# Patient Record
Sex: FEMALE | Race: WHITE | NOT HISPANIC OR LATINO | URBAN - NONMETROPOLITAN AREA
[De-identification: names, ages, dates, MRNs, and addresses within clinical notes are randomized per-mention and may not be internally consistent; named-entity substitution may affect disease eponyms.]

---

## 2020-01-06 RX ORDER — LITHIUM CARBONATE 150 MG/1
150 CAPSULE ORAL
Qty: 270 CAPSULE | Refills: 0 | Status: SHIPPED | OUTPATIENT
Start: 2020-01-06 | End: 2020-10-20

## 2020-10-20 ENCOUNTER — OFFICE VISIT (OUTPATIENT)
Dept: SURGERY | Facility: CLINIC | Age: 40
End: 2020-10-20

## 2020-10-20 VITALS
BODY MASS INDEX: 36.19 KG/M2 | WEIGHT: 212 LBS | HEART RATE: 71 BPM | HEIGHT: 64 IN | SYSTOLIC BLOOD PRESSURE: 113 MMHG | DIASTOLIC BLOOD PRESSURE: 65 MMHG

## 2020-10-20 DIAGNOSIS — R19.00 ABDOMINAL WALL BULGE: Primary | ICD-10-CM

## 2020-10-20 PROCEDURE — 99243 OFF/OP CNSLTJ NEW/EST LOW 30: CPT | Performed by: SURGERY

## 2020-10-20 RX ORDER — LANOLIN ALCOHOL/MO/W.PET/CERES
1000 CREAM (GRAM) TOPICAL DAILY
COMMUNITY

## 2020-10-20 RX ORDER — HYDROCHLOROTHIAZIDE 25 MG/1
25 TABLET ORAL DAILY
COMMUNITY

## 2020-10-20 RX ORDER — TRAZODONE HYDROCHLORIDE 150 MG/1
150 TABLET ORAL NIGHTLY
COMMUNITY
End: 2021-01-08

## 2020-10-20 RX ORDER — LOSARTAN POTASSIUM 50 MG/1
50 TABLET ORAL DAILY
COMMUNITY

## 2020-10-20 RX ORDER — ROPINIROLE 1 MG/1
1 TABLET, FILM COATED ORAL NIGHTLY
COMMUNITY
End: 2021-01-22

## 2020-10-20 RX ORDER — PROMETHAZINE HYDROCHLORIDE 25 MG/1
25 TABLET ORAL EVERY 6 HOURS PRN
COMMUNITY
End: 2021-01-08

## 2020-10-20 RX ORDER — FLUOXETINE HYDROCHLORIDE 20 MG/1
40 CAPSULE ORAL DAILY
COMMUNITY
End: 2021-01-08

## 2020-10-20 NOTE — PROGRESS NOTES
Subjective   Yuni Garcia is a 40 y.o. female is being seen for consultation today at the request of Erasmo Moreno APRN    Yuni Garcia is a 40 y.o. female With a history of cholecystectomy and abdominoplasty presenting with bulge in the left upper quadrant.  There is tenderness to palpation along the spigelian line but no definitive hernia defect identified.  There is on the left upper quadrant.  All other incisions healed well with no evidence of hernia.  No obstructive symptoms reported.  Main complaint of bulging and pain.  She does not smoke.      History reviewed. No pertinent past medical history.    Family History   Problem Relation Age of Onset   • Diabetes Mother    • Heart disease Father        Social History     Socioeconomic History   • Marital status: Unknown     Spouse name: Not on file   • Number of children: Not on file   • Years of education: Not on file   • Highest education level: Not on file   Tobacco Use   • Smoking status: Never Smoker   • Smokeless tobacco: Never Used   Substance and Sexual Activity   • Alcohol use: Never     Frequency: Never   • Drug use: Never       Past Surgical History:   Procedure Laterality Date   • BREAST SURGERY      lift   • GALLBLADDER SURGERY     • STOMACH SURGERY      tummy tuck   • TUBAL ABDOMINAL LIGATION         Review of Systems   Constitutional: Negative for activity change, appetite change, chills and fever.   HENT: Negative for sore throat and trouble swallowing.    Eyes: Negative for visual disturbance.   Respiratory: Negative for cough and shortness of breath.    Cardiovascular: Negative for chest pain and palpitations.   Gastrointestinal: Positive for abdominal pain. Negative for abdominal distention, blood in stool, constipation, diarrhea, nausea and vomiting.   Endocrine: Negative for cold intolerance and heat intolerance.   Genitourinary: Negative for dysuria.   Musculoskeletal: Negative for joint swelling.   Skin: Negative for color  "change, rash and wound.   Allergic/Immunologic: Negative for immunocompromised state.   Neurological: Negative for dizziness, seizures, weakness and headaches.   Hematological: Negative for adenopathy. Does not bruise/bleed easily.   Psychiatric/Behavioral: Negative for agitation and confusion.         /65   Pulse 71   Ht 162.6 cm (64\")   Wt 96.2 kg (212 lb)   BMI 36.39 kg/m²   Objective   Physical Exam  Constitutional:       Appearance: She is well-developed.   HENT:      Head: Normocephalic and atraumatic.   Eyes:      Conjunctiva/sclera: Conjunctivae normal.      Pupils: Pupils are equal, round, and reactive to light.   Neck:      Musculoskeletal: Neck supple.      Thyroid: No thyromegaly.      Vascular: No JVD.      Trachea: No tracheal deviation.   Cardiovascular:      Rate and Rhythm: Normal rate and regular rhythm.      Heart sounds: No murmur. No friction rub. No gallop.    Pulmonary:      Effort: Pulmonary effort is normal.      Breath sounds: Normal breath sounds.   Abdominal:      General: There is no distension.      Palpations: Abdomen is soft. There is no hepatomegaly or splenomegaly.      Tenderness: There is no abdominal tenderness.      Hernia: No hernia is present.          Comments: Tenderness with abdominal wall bulge   Musculoskeletal: Normal range of motion.         General: No deformity.   Skin:     General: Skin is warm and dry.   Neurological:      Mental Status: She is alert and oriented to person, place, and time.               Assessment   Diagnoses and all orders for this visit:    1. Abdominal wall bulge (Primary)  -     CT Abdomen Pelvis Without Contrast; Future      Yuni Garcia is a 40 y.o. female with abdominal wall bulge concerning for spigelian hernia.  CT of the abdomen pelvis will be performed and she will follow up after imaging.    Patient's Body mass index is 36.39 kg/m². BMI is above normal parameters. Recommendations include: educational material.             "

## 2020-11-04 ENCOUNTER — APPOINTMENT (OUTPATIENT)
Dept: CT IMAGING | Facility: HOSPITAL | Age: 40
End: 2020-11-04

## 2021-01-08 ENCOUNTER — OFFICE VISIT (OUTPATIENT)
Dept: PSYCHIATRY | Facility: CLINIC | Age: 41
End: 2021-01-08

## 2021-01-08 ENCOUNTER — LAB (OUTPATIENT)
Dept: LAB | Facility: HOSPITAL | Age: 41
End: 2021-01-08

## 2021-01-08 VITALS
HEART RATE: 89 BPM | DIASTOLIC BLOOD PRESSURE: 86 MMHG | HEIGHT: 64 IN | SYSTOLIC BLOOD PRESSURE: 136 MMHG | WEIGHT: 200 LBS | BODY MASS INDEX: 34.15 KG/M2

## 2021-01-08 DIAGNOSIS — F32.2 CURRENT SEVERE EPISODE OF MAJOR DEPRESSIVE DISORDER WITHOUT PSYCHOTIC FEATURES, UNSPECIFIED WHETHER RECURRENT (HCC): Primary | ICD-10-CM

## 2021-01-08 DIAGNOSIS — Z79.899 MEDICATION MANAGEMENT: ICD-10-CM

## 2021-01-08 DIAGNOSIS — F42.4 SKIN-PICKING DISORDER: ICD-10-CM

## 2021-01-08 DIAGNOSIS — E66.9 OBESITY (BMI 30.0-34.9): ICD-10-CM

## 2021-01-08 DIAGNOSIS — F41.1 GAD (GENERALIZED ANXIETY DISORDER): ICD-10-CM

## 2021-01-08 DIAGNOSIS — F43.81 GRIEF REACTION WITH PROLONGED BEREAVEMENT: ICD-10-CM

## 2021-01-08 DIAGNOSIS — R53.83 LOW ENERGY: ICD-10-CM

## 2021-01-08 LAB
AMPHETAMINE CUT-OFF: ABNORMAL
BENZODIAZIPINE CUT-OFF: ABNORMAL
BUPRENORPHINE CUT-OFF: ABNORMAL
COCAINE CUT-OFF: ABNORMAL
EXTERNAL AMPHETAMINE SCREEN URINE: POSITIVE
EXTERNAL BENZODIAZEPINE SCREEN URINE: NEGATIVE
EXTERNAL BUPRENORPHINE SCREEN URINE: NEGATIVE
EXTERNAL COCAINE SCREEN URINE: NEGATIVE
EXTERNAL MDMA: NEGATIVE
EXTERNAL METHADONE SCREEN URINE: NEGATIVE
EXTERNAL METHAMPHETAMINE SCREEN URINE: NEGATIVE
EXTERNAL OPIATES SCREEN URINE: NEGATIVE
EXTERNAL OXYCODONE SCREEN URINE: NEGATIVE
EXTERNAL THC SCREEN URINE: NEGATIVE
MDMA CUT-OFF: ABNORMAL
METHADONE CUT-OFF: ABNORMAL
METHAMPHETAMINE CUT-OFF: ABNORMAL
OPIATES CUT-OFF: ABNORMAL
OXYCODONE CUT-OFF: ABNORMAL
THC CUT-OFF: ABNORMAL

## 2021-01-08 PROCEDURE — 80178 ASSAY OF LITHIUM: CPT

## 2021-01-08 PROCEDURE — 82746 ASSAY OF FOLIC ACID SERUM: CPT | Performed by: NURSE PRACTITIONER

## 2021-01-08 PROCEDURE — 84439 ASSAY OF FREE THYROXINE: CPT | Performed by: NURSE PRACTITIONER

## 2021-01-08 PROCEDURE — 80061 LIPID PANEL: CPT

## 2021-01-08 PROCEDURE — 82652 VIT D 1 25-DIHYDROXY: CPT

## 2021-01-08 PROCEDURE — 36415 COLL VENOUS BLD VENIPUNCTURE: CPT | Performed by: NURSE PRACTITIONER

## 2021-01-08 PROCEDURE — 82607 VITAMIN B-12: CPT | Performed by: NURSE PRACTITIONER

## 2021-01-08 PROCEDURE — 82248 BILIRUBIN DIRECT: CPT

## 2021-01-08 PROCEDURE — 80050 GENERAL HEALTH PANEL: CPT

## 2021-01-08 PROCEDURE — 90792 PSYCH DIAG EVAL W/MED SRVCS: CPT | Performed by: NURSE PRACTITIONER

## 2021-01-08 RX ORDER — FEXOFENADINE HCL 180 MG/1
TABLET ORAL
COMMUNITY
Start: 2020-12-11

## 2021-01-08 RX ORDER — PHENTERMINE HYDROCHLORIDE 37.5 MG/1
37.5 TABLET ORAL
COMMUNITY
End: 2021-01-22

## 2021-01-08 RX ORDER — CYANOCOBALAMIN 1000 UG/ML
INJECTION, SOLUTION INTRAMUSCULAR; SUBCUTANEOUS
COMMUNITY
Start: 2020-12-10

## 2021-01-08 RX ORDER — QUETIAPINE FUMARATE 25 MG/1
25 TABLET, FILM COATED ORAL NIGHTLY
Qty: 30 TABLET | Refills: 0 | Status: SHIPPED | OUTPATIENT
Start: 2021-01-08 | End: 2021-01-22

## 2021-01-08 RX ORDER — VORTIOXETINE 20 MG/1
20 TABLET, FILM COATED ORAL DAILY
Qty: 30 TABLET | Refills: 1 | Status: SHIPPED | OUTPATIENT
Start: 2021-01-08 | End: 2021-01-22

## 2021-01-08 RX ORDER — LITHIUM CARBONATE 300 MG
300 TABLET ORAL 2 TIMES DAILY
Qty: 60 TABLET | Refills: 0 | Status: SHIPPED | OUTPATIENT
Start: 2021-01-08 | End: 2021-01-22

## 2021-01-08 NOTE — PROGRESS NOTES
"Chief Complaint  No chief complaint on file.    Subjective     {CC  Problem List  Visit Diagnosis   Encounters  Notes  Medications  Labs  Result Review Imaging  Media :23}     Yuni Garcia presents to [facility] for   History of Present Illness    Objective   Vital Signs:   /86   Pulse 89   Ht 162.2 cm (63.86\")   Wt 90.7 kg (200 lb)   BMI 34.48 kg/m²       PHQ-9 Score:   PHQ-9 Total Score: 21     Mental Status Exam:   Hygiene:   {good/fair/poor:328150753}  Cooperation:  {Cooperation:868253055}  Eye Contact:  {Eye Contact:808777367}  Psychomotor Behavior:  {Psychomotor Behavior:78193}  Affect:  {Affect:510465073}  Mood: {Mood:23560}  Speech:  {Speech:379262114}  Thought Process:  {Thought Process:563776351}  Thought Content:  {Thought Content:794724646}  Suicidal:  {Suicidal:371561989}  Homicidal:  {Homidical:733649614}  Hallucinations:  {Hallucinations:461267028}  Delusion:  {Delusion:624974682}  Memory:  {Memory:424158440}  Orientation:  {Orientation:957766543}  Reliability:  {good/fair/poor:724256665}  Insight:  {good/fair/poor/none:622652193}  Judgement:  {good/fair/poor/impaired:672061161}  Impulse Control:  {good/fair/poor/impaired:631072133}  Physical/Medical Issues:  {No/Yes:442841123}     Current Medications:   Current Outpatient Medications   Medication Sig Dispense Refill   • cyanocobalamin 1000 MCG/ML injection      • fexofenadine (ALLEGRA) 180 MG tablet      • hydroCHLOROthiazide (HYDRODIURIL) 25 MG tablet Take 25 mg by mouth Daily.     • losartan (COZAAR) 50 MG tablet Take 50 mg by mouth Daily.     • phentermine (ADIPEX-P) 37.5 MG tablet Take 37.5 mg by mouth Every Morning Before Breakfast.     • rOPINIRole (REQUIP) 1 MG tablet Take 1 mg by mouth Every Night. Take 1 hour before bedtime.     • silver sulfadiazine (SILVADENE, SSD) 1 % cream Apply  topically to the appropriate area as directed 2 (Two) Times a Day.     • vitamin B-12 (CYANOCOBALAMIN) " 1000 MCG tablet Take 1,000 mcg by mouth Daily.     • lithium 300 MG tablet Take 1 tablet by mouth 2 (two) times a day. 60 tablet 0   • QUEtiapine (SEROquel) 25 MG tablet Take 1 tablet by mouth Every Night. 30 tablet 0   • Vortioxetine HBr (Trintellix) 20 MG tablet Take 20 mg by mouth Daily. Take with food 30 tablet 1     No current facility-administered medications for this visit.        Physical Exam   Result Review :{ Labs  Result Review  Imaging  Med Tab  Media :23}     {The following data was reviewed by (Optional):34520}  {Ambulatory Labs (Optional):73695}  {Data reviewed (Optional):87818:::1}        Assessment and Plan {CC Problem List  Visit Diagnosis  ROS  Review (Popup)  Health Maintenance  Quality  BestPractice  Medications  SmartSets  SnapShot Encounters  Media :23}   Problem List Items Addressed This Visit     None      Visit Diagnoses     Persistent depressive disorder    -  Primary    Relevant Medications    phentermine (ADIPEX-P) 37.5 MG tablet    Vortioxetine HBr (Trintellix) 20 MG tablet    QUEtiapine (SEROquel) 25 MG tablet    lithium 300 MG tablet    Skin-picking disorder        JUSTUS (generalized anxiety disorder)        Relevant Medications    phentermine (ADIPEX-P) 37.5 MG tablet    Vortioxetine HBr (Trintellix) 20 MG tablet    QUEtiapine (SEROquel) 25 MG tablet    lithium 300 MG tablet    Grief reaction with prolonged bereavement        Relevant Medications    Vortioxetine HBr (Trintellix) 20 MG tablet    QUEtiapine (SEROquel) 25 MG tablet    lithium 300 MG tablet    Low energy        Obesity (BMI 30.0-34.9)        Medication management        Relevant Orders    [facility] Drug Screen (Completed)    Lithium Level    CBC & Differential    Comprehensive Metabolic Panel    Vitamin D 1,25 Dihydroxy    Vitamin B12 & Folate    TSH    T4, free    Hepatic Function Panel    Lipid Panel            TREATMENT PLAN/GOALS: Continue supportive psychotherapy efforts and medications as indicated.  Treatment and medication options discussed during today's visit. Patient ackowledged and verbally consented to continue with current treatment plan and was educated on the importance of compliance with treatment and follow-up appointments.    MEDICATION ISSUES:  We discussed risks, benefits, and side effects of the above medications and the patient was agreeable with the plan. Patient was educated on the importance of compliance with treatment and follow-up appointments.  Patient is agreeable to call the office with any worsening of symptoms or onset of side effects. Patient is agreeable to call 911 or go to the nearest ER should he/she begin having SI/HI.      Counseled patient regarding multimodal approach with healthy nutrition, healthy sleep, regular physical activity, social activities, counseling, and medications.      Coping skills reviewed and encouraged positive framing of thoughts     Assisted patient in processing above session content; acknowledged and normalized patient’s thoughts, feelings, and concerns.  Applied  positive coping skills and behavior management in session.  Allowed patient to freely discuss issues without interruption or judgment. Provided safe, confidential environment to facilitate the development of positive therapeutic relationship and encourage open, honest communication. Assisted patient in identifying risk factors which would indicate the need for higher level of care including thoughts to harm self or others and/or self-harming behavior and encouraged patient to contact this office, call 911, or present to the nearest emergency room should any of these events occur. Discussed crisis intervention services and means to access.     MEDS ORDERED DURING VISIT:  New Medications Ordered This Visit   Medications   • Vortioxetine HBr (Trintellix) 20 MG tablet     Sig: Take 20 mg by mouth Daily. Take with food     Dispense:  30 tablet     Refill:  1   • QUEtiapine (SEROquel) 25 MG tablet      Sig: Take 1 tablet by mouth Every Night.     Dispense:  30 tablet     Refill:  0   • lithium 300 MG tablet     Sig: Take 1 tablet by mouth 2 (two) times a day.     Dispense:  60 tablet     Refill:  0       {Time Spent (Optional):10389}    Follow Up {Instructions Charge Capture  Follow-up Communications :23}  Return in about 2 weeks (around 1/22/2021), or if symptoms worsen or fail to improve.    Patient was given instructions and counseling regarding her condition or for health maintenance advice. Please see specific information pulled into the AVS if appropriate.     This document has been electronically signed by KRISHNA Rivers  January 8, 2021 14:39 EST    Part of this note may be an electronic transcription/translation of spoken language to printed text using the Dragon Dictation System.

## 2021-01-09 LAB
ALBUMIN SERPL-MCNC: 4.7 G/DL (ref 3.5–5.2)
ALBUMIN/GLOB SERPL: 1.6 G/DL
ALP SERPL-CCNC: 81 U/L (ref 39–117)
ALT SERPL W P-5'-P-CCNC: 15 U/L (ref 1–33)
ANION GAP SERPL CALCULATED.3IONS-SCNC: 10 MMOL/L (ref 5–15)
AST SERPL-CCNC: 20 U/L (ref 1–32)
BASOPHILS # BLD AUTO: 0.06 10*3/MM3 (ref 0–0.2)
BASOPHILS NFR BLD AUTO: 0.5 % (ref 0–1.5)
BILIRUB CONJ SERPL-MCNC: <0.2 MG/DL (ref 0–0.3)
BILIRUB SERPL-MCNC: 0.6 MG/DL (ref 0–1.2)
BUN SERPL-MCNC: 8 MG/DL (ref 6–20)
BUN/CREAT SERPL: 10.1 (ref 7–25)
CALCIUM SPEC-SCNC: 9.3 MG/DL (ref 8.6–10.5)
CHLORIDE SERPL-SCNC: 100 MMOL/L (ref 98–107)
CHOLEST SERPL-MCNC: 174 MG/DL (ref 0–200)
CO2 SERPL-SCNC: 27 MMOL/L (ref 22–29)
CREAT SERPL-MCNC: 0.79 MG/DL (ref 0.57–1)
DEPRECATED RDW RBC AUTO: 41.6 FL (ref 37–54)
EOSINOPHIL # BLD AUTO: 0.19 10*3/MM3 (ref 0–0.4)
EOSINOPHIL NFR BLD AUTO: 1.6 % (ref 0.3–6.2)
ERYTHROCYTE [DISTWIDTH] IN BLOOD BY AUTOMATED COUNT: 13.1 % (ref 12.3–15.4)
FOLATE SERPL-MCNC: >20 NG/ML (ref 4.78–24.2)
GFR SERPL CREATININE-BSD FRML MDRD: 80 ML/MIN/1.73
GLOBULIN UR ELPH-MCNC: 3 GM/DL
GLUCOSE SERPL-MCNC: 99 MG/DL (ref 65–99)
HCT VFR BLD AUTO: 43.2 % (ref 34–46.6)
HDLC SERPL-MCNC: 45 MG/DL (ref 40–60)
HGB BLD-MCNC: 14.4 G/DL (ref 12–15.9)
IMM GRANULOCYTES # BLD AUTO: 0.05 10*3/MM3 (ref 0–0.05)
IMM GRANULOCYTES NFR BLD AUTO: 0.4 % (ref 0–0.5)
LDLC SERPL CALC-MCNC: 97 MG/DL (ref 0–100)
LDLC/HDLC SERPL: 2.04 {RATIO}
LITHIUM SERPL-SCNC: 0.2 MMOL/L (ref 0.6–1.2)
LYMPHOCYTES # BLD AUTO: 2.68 10*3/MM3 (ref 0.7–3.1)
LYMPHOCYTES NFR BLD AUTO: 22.8 % (ref 19.6–45.3)
MCH RBC QN AUTO: 29.2 PG (ref 26.6–33)
MCHC RBC AUTO-ENTMCNC: 33.3 G/DL (ref 31.5–35.7)
MCV RBC AUTO: 87.6 FL (ref 79–97)
MONOCYTES # BLD AUTO: 0.64 10*3/MM3 (ref 0.1–0.9)
MONOCYTES NFR BLD AUTO: 5.4 % (ref 5–12)
NEUTROPHILS NFR BLD AUTO: 69.3 % (ref 42.7–76)
NEUTROPHILS NFR BLD AUTO: 8.16 10*3/MM3 (ref 1.7–7)
NRBC BLD AUTO-RTO: 0 /100 WBC (ref 0–0.2)
PLATELET # BLD AUTO: 376 10*3/MM3 (ref 140–450)
PMV BLD AUTO: 10.9 FL (ref 6–12)
POTASSIUM SERPL-SCNC: 3.7 MMOL/L (ref 3.5–5.2)
PROT SERPL-MCNC: 7.7 G/DL (ref 6–8.5)
RBC # BLD AUTO: 4.93 10*6/MM3 (ref 3.77–5.28)
SODIUM SERPL-SCNC: 137 MMOL/L (ref 136–145)
T4 FREE SERPL-MCNC: 1.23 NG/DL (ref 0.93–1.7)
TRIGL SERPL-MCNC: 185 MG/DL (ref 0–150)
TSH SERPL DL<=0.05 MIU/L-ACNC: 2.4 UIU/ML (ref 0.27–4.2)
VIT B12 BLD-MCNC: 1289 PG/ML (ref 211–946)
VLDLC SERPL-MCNC: 32 MG/DL (ref 5–40)
WBC # BLD AUTO: 11.78 10*3/MM3 (ref 3.4–10.8)

## 2021-01-11 LAB — 1,25(OH)2D SERPL-MCNC: 44.2 PG/ML (ref 19.9–79.3)

## 2021-01-18 ENCOUNTER — TELEPHONE (OUTPATIENT)
Dept: PSYCHIATRY | Facility: CLINIC | Age: 41
End: 2021-01-18

## 2021-01-18 DIAGNOSIS — Z79.899 LITHIUM USE: Primary | ICD-10-CM

## 2021-01-18 NOTE — TELEPHONE ENCOUNTER
Spoke with Yuni regarding her lithium level of 0.2 which was drawn on 1/9/21. States she started taking 300 mg p.o. twice daily when she left the office on 1/9/2021.  Prior to that she was taking lithium 150 mg p.o. twice daily.  She was advised to continue taking lithium 300 mg p.o. twice daily and return for lithium blood draw prior to her follow-up on 1/22/2021.  Patient was advised take her lithium 10-12 hours prior to having her level drawn. .  She states she has been making herself drink a lot of water.  She notices no negative side effects from lithium.  She believes her mood has slightly improved and she is not wanting to sleep as much.  She states she stopped taking Trintellix due to nausea.  She had some Prozac on hand and started taking Prozac 40 mg.  Patient advised to seek provider's direction prior to abruptly discontinuing or starting psychiatric medications.  Risk of serotonin syndrome and symptoms discussed.  Patient verbalizes understanding.  She is to have lithium level drawn before follow-up appointment

## 2021-01-21 ENCOUNTER — LAB (OUTPATIENT)
Dept: LAB | Facility: HOSPITAL | Age: 41
End: 2021-01-21

## 2021-01-21 DIAGNOSIS — Z79.899 LITHIUM USE: ICD-10-CM

## 2021-01-21 PROCEDURE — 36415 COLL VENOUS BLD VENIPUNCTURE: CPT

## 2021-01-21 PROCEDURE — 80178 ASSAY OF LITHIUM: CPT

## 2021-01-22 ENCOUNTER — OFFICE VISIT (OUTPATIENT)
Dept: PSYCHIATRY | Facility: CLINIC | Age: 41
End: 2021-01-22

## 2021-01-22 VITALS
BODY MASS INDEX: 34.72 KG/M2 | SYSTOLIC BLOOD PRESSURE: 131 MMHG | TEMPERATURE: 97.5 F | WEIGHT: 203.4 LBS | DIASTOLIC BLOOD PRESSURE: 77 MMHG | HEIGHT: 64 IN | HEART RATE: 71 BPM

## 2021-01-22 DIAGNOSIS — F42.2 MIXED OBSESSIONAL THOUGHTS AND ACTS: ICD-10-CM

## 2021-01-22 DIAGNOSIS — F99 INSOMNIA DUE TO OTHER MENTAL DISORDER: ICD-10-CM

## 2021-01-22 DIAGNOSIS — Z79.899 ENCOUNTER FOR LITHIUM MONITORING: ICD-10-CM

## 2021-01-22 DIAGNOSIS — F42.4 COMPULSIVE SKIN PICKING: ICD-10-CM

## 2021-01-22 DIAGNOSIS — F51.05 INSOMNIA DUE TO OTHER MENTAL DISORDER: ICD-10-CM

## 2021-01-22 DIAGNOSIS — F33.1 MODERATE EPISODE OF RECURRENT MAJOR DEPRESSIVE DISORDER (HCC): Primary | ICD-10-CM

## 2021-01-22 DIAGNOSIS — Z51.81 ENCOUNTER FOR LITHIUM MONITORING: ICD-10-CM

## 2021-01-22 DIAGNOSIS — F41.1 GAD (GENERALIZED ANXIETY DISORDER): ICD-10-CM

## 2021-01-22 DIAGNOSIS — Z79.899 MEDICATION MANAGEMENT: ICD-10-CM

## 2021-01-22 LAB
AMPHETAMINE CUT-OFF: NORMAL
BENZODIAZIPINE CUT-OFF: NORMAL
BUPRENORPHINE CUT-OFF: NORMAL
COCAINE CUT-OFF: NORMAL
EXTERNAL AMPHETAMINE SCREEN URINE: NEGATIVE
EXTERNAL BENZODIAZEPINE SCREEN URINE: NEGATIVE
EXTERNAL BUPRENORPHINE SCREEN URINE: NEGATIVE
EXTERNAL COCAINE SCREEN URINE: NEGATIVE
EXTERNAL MDMA: NEGATIVE
EXTERNAL METHADONE SCREEN URINE: NEGATIVE
EXTERNAL METHAMPHETAMINE SCREEN URINE: NEGATIVE
EXTERNAL OPIATES SCREEN URINE: NEGATIVE
EXTERNAL OXYCODONE SCREEN URINE: NEGATIVE
EXTERNAL THC SCREEN URINE: NEGATIVE
LITHIUM SERPL-SCNC: 0.4 MMOL/L (ref 0.6–1.2)
MDMA CUT-OFF: NORMAL
METHADONE CUT-OFF: NORMAL
METHAMPHETAMINE CUT-OFF: NORMAL
OPIATES CUT-OFF: NORMAL
OXYCODONE CUT-OFF: NORMAL
THC CUT-OFF: NORMAL

## 2021-01-22 PROCEDURE — 99215 OFFICE O/P EST HI 40 MIN: CPT | Performed by: NURSE PRACTITIONER

## 2021-01-22 RX ORDER — FLUTICASONE PROPIONATE 50 MCG
SPRAY, SUSPENSION (ML) NASAL
COMMUNITY

## 2021-01-22 RX ORDER — LITHIUM CARBONATE 300 MG
450 TABLET ORAL 2 TIMES DAILY
Qty: 90 TABLET | Refills: 0 | Status: SHIPPED | OUTPATIENT
Start: 2021-01-22 | End: 2021-02-05 | Stop reason: SDUPTHER

## 2021-01-22 RX ORDER — SENNA PLUS 8.6 MG/1
2 TABLET ORAL DAILY PRN
Qty: 60 TABLET | Refills: 0 | Status: SHIPPED | OUTPATIENT
Start: 2021-01-22 | End: 2021-02-05 | Stop reason: SDUPTHER

## 2021-01-22 RX ORDER — CLOMIPRAMINE HYDROCHLORIDE 25 MG/1
25 CAPSULE ORAL NIGHTLY
Qty: 30 CAPSULE | Refills: 0 | Status: SHIPPED | OUTPATIENT
Start: 2021-01-22 | End: 2021-02-05

## 2021-01-22 NOTE — PROGRESS NOTES
Subjective   Yuni Garcia is a 40 y.o. female who presents today for her 2-week follow-up appointment for medication management.     Chief Complaint:  Depression, Low motivation, insomnia,     History of Present Illness:   Yuni  appears depressed. States she went to the graveyard yesterday and placed núñez on her father's grave.  She reports feeling depressed, anxious and unmotivated but believes she is somewhat improved.  Seroquel 25 mg p.o. at bedtime has helped her initiate sleep. She continues to have interrupted sleep.  Denies nightmares.  Average duration of sleep 5-6 hours per night.  She was diagnosed with sleep apnea years ago and has since lost 50 pounds.  She no longer feels like it is a problem because her  tells her she does not snore anymore. Patient encouraged to get reevaluated.  Yuni states she stopped taking Trintellix after she left the office two weeks ago and is now starting to get her appetite back and has since gained  4 pounds.  After she stopped the Trintellix, she took one Prozac pill, but it worsened her skin picking so she stopped. Patient educated regarding potential interactions and dangers associated with stopping medication abruptly and taking psychotropic medication not  prescribed. Patient verbalizes understanding.     Patient's PHQ score is improved to 13.  She denies SI/HI/AVH.  Her JUSTUS score is 17.  She describes obsessive thoughts and behaviors  that involve self cleaning, repetitively checking and locking doors, picking her skin that are out of the normal habits and behaviors.  Her compulsions clean and make her irritable towards her children.    PHQ-9 Depression Screening  Little interest or pleasure in doing things? 2   Feeling down, depressed, or hopeless? 2   Trouble falling or staying asleep, or sleeping too much? 2   Feeling tired or having little energy? 3   Poor appetite or overeating? 0   Feeling bad about yourself - or that you are a failure or have let  yourself or your family down? 0   Trouble concentrating on things, such as reading the newspaper or watching television? 1   Moving or speaking so slowly that other people could have noticed? Or the opposite - being so fidgety or restless that you have been moving around a lot more than usual? 3   Thoughts that you would be better off dead, or of hurting yourself in some way? 0   PHQ-9 Total Score 13   If you checked off any problems, how difficult have these problems made it for you to do your work, take care of things at home, or get along with other people? Very difficult     Medication treatment options discussed with Yuni.  She denies medication side effects. Signs and symptoms of lithium toxicity reviewed. Patient verbalizes understanding and agrees to monitor and report symptoms to provider. Patient states that she is making sure she drinks an adequate amount of water.  Labs dated 1/8/2020 reviewed.  Patient's lithium level at that time was 0.4. Risks, benefits, potential side effects of increasing lithium dose to 450 p.o. twice daily discussed.  Patient agrees with monitor and increase the dose because she believes she does see some improvement.  Extensive plan of lithium treatment reviewed with patient. Patient advised she would need to have her lithium level redrawn in 1 week. Patient  advised that hydrochlorothiazide can inhibit it lithium excretion, therefore importance of monitoring for toxicity emphasized.  Consideration of discontinuing Seroquel and initiating cloimipramine 25 mg p.o. at bedtime goal to prove sleep, treatment resistant depression, and obsessional thoughts.  Patient educated on increased risk of side effects due to TCA class of medication.  Physically discussed was the potential for electrolyte imbalance and severe constipation.  Additional side effects of clomipramine reviewed including but not limited to: Blurred vision, dry mouth, increased appetite, dizziness, sedation, nausea.   Provider and patient collaboratively agree to medication treatment plan.  Orders for lithium 450 mg p.o. twice daily and Anafranil 25 mg p.o. at bedtime electronically sent to [facility] pharmacy.  Patient agrees to have her lithium level drawn in 1 week and follow-up with provider in 2 weeks.  She is advised to notify provider should she experience any intolerable side effects of medication.    Denies any use of nicotine substances    Corral tox negative    Jimmy report reviewed and appropriate    She is a non-smoker.    The following portions of the patient's history were reviewed and updated as appropriate: allergies, current medications, past family history, past medical history, past social history, past surgical history and problem list.    Past Medical History:  Past Medical History:   Diagnosis Date   • HTN (hypertension)      Social History:  Social History     Socioeconomic History   • Marital status:      Spouse name: Not on file   • Number of children: Not on file   • Years of education: Not on file   • Highest education level: Not on file   Tobacco Use   • Smoking status: Never Smoker   • Smokeless tobacco: Never Used   Substance and Sexual Activity   • Alcohol use: Never     Frequency: Never   • Drug use: Never     Family History:  Family History   Problem Relation Age of Onset   • Diabetes Mother    • Heart disease Father      Past Surgical History:  Past Surgical History:   Procedure Laterality Date   • BREAST SURGERY      lift   • GALLBLADDER SURGERY     • STOMACH SURGERY      tummy tuck   • TUBAL ABDOMINAL LIGATION       Problem List:  Patient Active Problem List   Diagnosis   • Abdominal wall bulge     Allergy:   Allergies   Allergen Reactions   • Latex Hives      Current Medications:   Current Outpatient Medications   Medication Sig Dispense Refill   • cyanocobalamin 1000 MCG/ML injection      • Diclofenac Sodium (VOLTAREN) 1 % gel gel diclofenac 1 % topical gel     • fexofenadine  "(ALLEGRA) 180 MG tablet      • fluticasone (FLONASE) 50 MCG/ACT nasal spray fluticasone propionate 50 mcg/actuation nasal spray,suspension     • hydroCHLOROthiazide (HYDRODIURIL) 25 MG tablet Take 25 mg by mouth Daily.     • lithium 300 MG tablet Take 1.5 tablets by mouth 2 (two) times a day for 30 days. 90 tablet 0   • losartan (COZAAR) 50 MG tablet Take 50 mg by mouth Daily.     • silver sulfadiazine (SILVADENE, SSD) 1 % cream Apply  topically to the appropriate area as directed 2 (Two) Times a Day.     • vitamin B-12 (CYANOCOBALAMIN) 1000 MCG tablet Take 1,000 mcg by mouth Daily.     • clomiPRAMINE (ANAFRANIL) 25 MG capsule Take 1 capsule by mouth Every Night. Monitor for sedation, constipation 30 capsule 0   • senna (Senokot) 8.6 MG tablet Take 2 tablets by mouth Daily As Needed for Constipation. 60 tablet 0     No current facility-administered medications for this visit.      Review of Symptoms:    Review of Systems   Constitutional: Positive for activity change, appetite change and unexpected weight gain.   Musculoskeletal: Positive for arthralgias and back pain.   Skin: Positive for skin lesions.   Neurological: Positive for memory problem.   Psychiatric/Behavioral: Positive for agitation, behavioral problems, decreased concentration, dysphoric mood, sleep disturbance, depressed mood and stress. The patient is nervous/anxious.         Crying   All other systems reviewed and are negative.    Physical Exam:   Blood pressure 131/77, pulse 71, temperature 97.5 °F (36.4 °C), height 162.6 cm (64.02\"), weight 92.3 kg (203 lb 6.4 oz).  Body mass index is 34.9 kg/m².    Appearance: disheveled  Gait, Station, Strength: posture upright, gait steady    Mental Status Exam:   Hygiene:   good  Cooperation:  Cooperative  Eye Contact:  Downcast  Psychomotor Behavior:  Appropriate  Affect:  Full range  Mood: depressed  Hopelessness: 6  Speech:  Monotone  Thought Process:  Goal directed  Thought Content:  Mood " congruent  Suicidal:  None  Homicidal:  None  Hallucinations:  None  Delusion:  None  Memory:  Deficits  Orientation:  Person, Place, Time and Situation  Reliability:  fair  Insight:  Fair and Poor  Judgement:  Fair  Impulse Control:  Good  Physical/Medical Issues:        Lab Results: Labs pulled in after pt visit  Office Visit on 01/22/2021   Component Date Value Ref Range Status   • External Amphetamine Screen Urine 01/22/2021 Negative   Final   • Amphetamine Cut-Off 01/22/2021 1000ng/ml   Final   • External Benzodiazepine Screen Uri* 01/22/2021 Negative   Final   • Benzodiazipine Cut-Off 01/22/2021 300ng/ml   Final   • External Cocaine Screen Urine 01/22/2021 Negative   Final   • Cocaine Cut-Off 01/22/2021 300ng/ml   Final   • External THC Screen Urine 01/22/2021 Negative   Final   • THC Cut-Off 01/22/2021 50ng/ml   Final   • External Methadone Screen Urine 01/22/2021 Negative   Final   • Methadone Cut-Off 01/22/2021 300ng/ml   Final   • External Methamphetamine Screen Ur* 01/22/2021 Negative   Final   • Methamphetamine Cut-Off 01/22/2021 1000ng/ml   Final   • External Oxycodone Screen Urine 01/22/2021 Negative   Final   • Oxycodone Cut-Off 01/22/2021 100ng/ml   Final   • External Buprenorphine Screen Urine 01/22/2021 Negative   Final   • Buprenorphine Cut-Off 01/22/2021 10ng/ml   Final   • External MDMA 01/22/2021 Negative   Final   • MDMA Cut-Off 01/22/2021 500ng/ml   Final   • External Opiates Screen Urine 01/22/2021 Negative   Final   • Opiates Cut-Off 01/22/2021 300ng/ml   Final   Lab on 01/21/2021   Component Date Value Ref Range Status   • Lithium 01/21/2021 0.4* 0.6 - 1.2 mmol/L Final   Lab on 01/08/2021   Component Date Value Ref Range Status   • Lithium 01/08/2021 0.2* 0.6 - 1.2 mmol/L Final   • Glucose 01/08/2021 99  65 - 99 mg/dL Final   • BUN 01/08/2021 8  6 - 20 mg/dL Final   • Creatinine 01/08/2021 0.79  0.57 - 1.00 mg/dL Final   • Sodium 01/08/2021 137  136 - 145 mmol/L Final   • Potassium  01/08/2021 3.7  3.5 - 5.2 mmol/L Final   • Chloride 01/08/2021 100  98 - 107 mmol/L Final   • CO2 01/08/2021 27.0  22.0 - 29.0 mmol/L Final   • Calcium 01/08/2021 9.3  8.6 - 10.5 mg/dL Final   • Total Protein 01/08/2021 7.7  6.0 - 8.5 g/dL Final   • Albumin 01/08/2021 4.70  3.50 - 5.20 g/dL Final   • ALT (SGPT) 01/08/2021 15  1 - 33 U/L Final   • AST (SGOT) 01/08/2021 20  1 - 32 U/L Final   • Alkaline Phosphatase 01/08/2021 81  39 - 117 U/L Final   • Total Bilirubin 01/08/2021 0.6  0.0 - 1.2 mg/dL Final   • eGFR Non  Amer 01/08/2021 80  >60 mL/min/1.73 Final   • Globulin 01/08/2021 3.0  gm/dL Final   • A/G Ratio 01/08/2021 1.6  g/dL Final   • BUN/Creatinine Ratio 01/08/2021 10.1  7.0 - 25.0 Final   • Anion Gap 01/08/2021 10.0  5.0 - 15.0 mmol/L Final   • 1,25-Dihydroxy, Vitamin D 01/08/2021 44.2  19.9 - 79.3 pg/mL Final   • Total Cholesterol 01/08/2021 174  0 - 200 mg/dL Final   • Triglycerides 01/08/2021 185* 0 - 150 mg/dL Final   • HDL Cholesterol 01/08/2021 45  40 - 60 mg/dL Final   • LDL Cholesterol  01/08/2021 97  0 - 100 mg/dL Final   • VLDL Cholesterol 01/08/2021 32  5 - 40 mg/dL Final   • LDL/HDL Ratio 01/08/2021 2.04   Final   • WBC 01/08/2021 11.78* 3.40 - 10.80 10*3/mm3 Final   • RBC 01/08/2021 4.93  3.77 - 5.28 10*6/mm3 Final   • Hemoglobin 01/08/2021 14.4  12.0 - 15.9 g/dL Final   • Hematocrit 01/08/2021 43.2  34.0 - 46.6 % Final   • MCV 01/08/2021 87.6  79.0 - 97.0 fL Final   • MCH 01/08/2021 29.2  26.6 - 33.0 pg Final   • MCHC 01/08/2021 33.3  31.5 - 35.7 g/dL Final   • RDW 01/08/2021 13.1  12.3 - 15.4 % Final   • RDW-SD 01/08/2021 41.6  37.0 - 54.0 fl Final   • MPV 01/08/2021 10.9  6.0 - 12.0 fL Final   • Platelets 01/08/2021 376  140 - 450 10*3/mm3 Final   • Neutrophil % 01/08/2021 69.3  42.7 - 76.0 % Final   • Lymphocyte % 01/08/2021 22.8  19.6 - 45.3 % Final   • Monocyte % 01/08/2021 5.4  5.0 - 12.0 % Final   • Eosinophil % 01/08/2021 1.6  0.3 - 6.2 % Final   • Basophil % 01/08/2021 0.5   0.0 - 1.5 % Final   • Immature Grans % 01/08/2021 0.4  0.0 - 0.5 % Final   • Neutrophils, Absolute 01/08/2021 8.16* 1.70 - 7.00 10*3/mm3 Final   • Lymphocytes, Absolute 01/08/2021 2.68  0.70 - 3.10 10*3/mm3 Final   • Monocytes, Absolute 01/08/2021 0.64  0.10 - 0.90 10*3/mm3 Final   • Eosinophils, Absolute 01/08/2021 0.19  0.00 - 0.40 10*3/mm3 Final   • Basophils, Absolute 01/08/2021 0.06  0.00 - 0.20 10*3/mm3 Final   • Immature Grans, Absolute 01/08/2021 0.05  0.00 - 0.05 10*3/mm3 Final   • nRBC 01/08/2021 0.0  0.0 - 0.2 /100 WBC Final   • Bilirubin, Direct 01/08/2021 <0.2  0.0 - 0.3 mg/dL Final   Office Visit on 01/08/2021   Component Date Value Ref Range Status   • External Amphetamine Screen Urine 01/08/2021 Positive   Final   • Amphetamine Cut-Off 01/08/2021 1000NG/ML   Final   • External Benzodiazepine Screen Uri* 01/08/2021 Negative   Final   • Benzodiazipine Cut-Off 01/08/2021 300NG/ML   Final   • External Cocaine Screen Urine 01/08/2021 Negative   Final   • Cocaine Cut-Off 01/08/2021 300NG/ML   Final   • External THC Screen Urine 01/08/2021 Negative   Final   • THC Cut-Off 01/08/2021 50NG/ML   Final   • External Methadone Screen Urine 01/08/2021 Negative   Final   • Methadone Cut-Off 01/08/2021 300NG/ML   Final   • External Methamphetamine Screen Ur* 01/08/2021 Negative   Final   • Methamphetamine Cut-Off 01/08/2021 1000NG/ML   Final   • External Oxycodone Screen Urine 01/08/2021 Negative   Final   • Oxycodone Cut-Off 01/08/2021 100NG/ML   Final   • External Buprenorphine Screen Urine 01/08/2021 Negative   Final   • Buprenorphine Cut-Off 01/08/2021 10NG/ML   Final   • External MDMA 01/08/2021 Negative   Final   • MDMA Cut-Off 01/08/2021 500NG/ML   Final   • External Opiates Screen Urine 01/08/2021 Negative   Final   • Opiates Cut-Off 01/08/2021 300NG/ML   Final   • Folate 01/08/2021 >20.00  4.78 - 24.20 ng/mL Final   • Vitamin B-12 01/08/2021 1,289* 211 - 946 pg/mL Final   • TSH 01/08/2021 2.400  0.270 -  4.200 uIU/mL Final   • Free T4 01/08/2021 1.23  0.93 - 1.70 ng/dL Final     Assessment/Plan   Diagnoses and all orders for this visit:    1. Moderate episode of recurrent major depressive disorder (CMS/HCC) (Primary)    2. JUSTUS (generalized anxiety disorder)  -     lithium 300 MG tablet; Take 1.5 tablets by mouth 2 (two) times a day for 30 days.  Dispense: 90 tablet; Refill: 0  -     clomiPRAMINE (ANAFRANIL) 25 MG capsule; Take 1 capsule by mouth Every Night. Monitor for sedation, constipation  Dispense: 30 capsule; Refill: 0    3. Insomnia due to other mental disorder  -     clomiPRAMINE (ANAFRANIL) 25 MG capsule; Take 1 capsule by mouth Every Night. Monitor for sedation, constipation  Dispense: 30 capsule; Refill: 0    4. Compulsive skin picking  -     clomiPRAMINE (ANAFRANIL) 25 MG capsule; Take 1 capsule by mouth Every Night. Monitor for sedation, constipation  Dispense: 30 capsule; Refill: 0    5. Mixed obsessional thoughts and acts  -     clomiPRAMINE (ANAFRANIL) 25 MG capsule; Take 1 capsule by mouth Every Night. Monitor for sedation, constipation  Dispense: 30 capsule; Refill: 0    6. Medication management  -     [facility] Drug Screen    7. Encounter for lithium monitoring  -     Lithium Level; Future    Other orders  -     senna (Senokot) 8.6 MG tablet; Take 2 tablets by mouth Daily As Needed for Constipation.  Dispense: 60 tablet; Refill: 0      Visit Diagnoses:    ICD-10-CM ICD-9-CM   1. Moderate episode of recurrent major depressive disorder (CMS/HCC)  F33.1 296.32   2. JUSTUS (generalized anxiety disorder)  F41.1 300.02   3. Insomnia due to other mental disorder  F51.05 300.9   4. Compulsive skin picking  F42.4 312.39   5. Mixed obsessional thoughts and acts  F42.2 300.3   6. Medication management  Z79.899 V58.69   7. Encounter for lithium monitoring  Z51.81 V58.83    Z79.899 V58.69     TREATMENT PLAN/GOALS:   Short Term  1. Pt will keep all appts for med mgt   2. Pt will take medications as prescribed and  report intolerable side effects  3. Pt will pursue psychotherapy services to help gain coping skill and process grief    Long term:   1. Pt will exhibit emotional stability  2. Pt will use coping skills to work through depression  3. Pt will manage obsessional thoughts and behaviors    MEDICATION ISSUES:  Discussed medication options and treatment plan of prescribed medication as well as the risks, benefits, and side effects including potential falls, possible impaired driving and metabolic adversities among others. Patient is agreeable to call the office with any worsening of symptoms or onset of side effects. Patient is agreeable to call 911 or go to the nearest ER should he/she begin having SI/HI.     MEDS ORDERED DURING VISIT:  New Medications Ordered This Visit   Medications   • lithium 300 MG tablet     Sig: Take 1.5 tablets by mouth 2 (two) times a day for 30 days.     Dispense:  90 tablet     Refill:  0   • clomiPRAMINE (ANAFRANIL) 25 MG capsule     Sig: Take 1 capsule by mouth Every Night. Monitor for sedation, constipation     Dispense:  30 capsule     Refill:  0   • senna (Senokot) 8.6 MG tablet     Sig: Take 2 tablets by mouth Daily As Needed for Constipation.     Dispense:  60 tablet     Refill:  0     Return in about 2 weeks (around 2/5/2021) for Recheck.         This document has been electronically signed by KRISHNA Rivers   January 22, 2021 13:11 EST    Part of this note may be an electronic transcription/translation of spoken language to printed text using the Dragon Dictation System.

## 2021-01-25 ENCOUNTER — TELEPHONE (OUTPATIENT)
Dept: PSYCHIATRY | Facility: CLINIC | Age: 41
End: 2021-01-25

## 2021-01-25 DIAGNOSIS — Z79.899 ENCOUNTER FOR LITHIUM MONITORING: Primary | ICD-10-CM

## 2021-01-25 DIAGNOSIS — Z51.81 ENCOUNTER FOR LITHIUM MONITORING: Primary | ICD-10-CM

## 2021-01-28 ENCOUNTER — TELEPHONE (OUTPATIENT)
Dept: PSYCHIATRY | Facility: CLINIC | Age: 41
End: 2021-01-28

## 2021-01-28 NOTE — TELEPHONE ENCOUNTER
Called pt to request her have her lithium level drawn within the next few days. Phone rang several times and went to a generic voicemail without patient identifier.  No voicemail left to do HIPPA

## 2021-01-30 ENCOUNTER — LAB (OUTPATIENT)
Dept: LAB | Facility: HOSPITAL | Age: 41
End: 2021-01-30

## 2021-01-30 DIAGNOSIS — Z79.899 ENCOUNTER FOR LITHIUM MONITORING: ICD-10-CM

## 2021-01-30 DIAGNOSIS — Z51.81 ENCOUNTER FOR LITHIUM MONITORING: ICD-10-CM

## 2021-01-30 PROCEDURE — 80178 ASSAY OF LITHIUM: CPT

## 2021-01-30 PROCEDURE — 36415 COLL VENOUS BLD VENIPUNCTURE: CPT

## 2021-01-31 LAB — LITHIUM SERPL-SCNC: 0.8 MMOL/L (ref 0.6–1.2)

## 2021-02-05 ENCOUNTER — OFFICE VISIT (OUTPATIENT)
Dept: PSYCHIATRY | Facility: CLINIC | Age: 41
End: 2021-02-05

## 2021-02-05 VITALS
SYSTOLIC BLOOD PRESSURE: 128 MMHG | HEART RATE: 79 BPM | HEIGHT: 64 IN | BODY MASS INDEX: 35.71 KG/M2 | WEIGHT: 209.2 LBS | DIASTOLIC BLOOD PRESSURE: 82 MMHG

## 2021-02-05 DIAGNOSIS — F42.4 COMPULSIVE SKIN PICKING: ICD-10-CM

## 2021-02-05 DIAGNOSIS — F51.05 INSOMNIA DUE TO MENTAL DISORDER: Primary | ICD-10-CM

## 2021-02-05 DIAGNOSIS — Z79.899 ENCOUNTER FOR LITHIUM MONITORING: ICD-10-CM

## 2021-02-05 DIAGNOSIS — Z79.899 MEDICATION MANAGEMENT: ICD-10-CM

## 2021-02-05 DIAGNOSIS — F41.1 GAD (GENERALIZED ANXIETY DISORDER): ICD-10-CM

## 2021-02-05 DIAGNOSIS — F42.2 MIXED OBSESSIONAL THOUGHTS AND ACTS: ICD-10-CM

## 2021-02-05 DIAGNOSIS — F33.1 MAJOR DEPRESSIVE DISORDER, RECURRENT EPISODE, MODERATE (HCC): ICD-10-CM

## 2021-02-05 DIAGNOSIS — Z51.81 ENCOUNTER FOR LITHIUM MONITORING: ICD-10-CM

## 2021-02-05 PROCEDURE — 99215 OFFICE O/P EST HI 40 MIN: CPT | Performed by: NURSE PRACTITIONER

## 2021-02-05 RX ORDER — TESTOSTERONE 20.25 MG/1.25G
GEL TOPICAL
COMMUNITY

## 2021-02-05 RX ORDER — LITHIUM CARBONATE 300 MG
450 TABLET ORAL 2 TIMES DAILY
Qty: 90 TABLET | Refills: 1 | Status: SHIPPED | OUTPATIENT
Start: 2021-02-05

## 2021-02-05 RX ORDER — CLOMIPRAMINE HYDROCHLORIDE 25 MG/1
CAPSULE ORAL
Qty: 52 CAPSULE | Refills: 0 | Status: SHIPPED | OUTPATIENT
Start: 2021-02-05 | End: 2021-03-05

## 2021-02-05 RX ORDER — SENNA PLUS 8.6 MG/1
2 TABLET ORAL DAILY PRN
Qty: 60 TABLET | Refills: 0 | Status: SHIPPED | OUTPATIENT
Start: 2021-02-05

## 2021-02-05 NOTE — PROGRESS NOTES
"Subjective   Yuni Garcia is a 41 y.o. female who presents today for her 2-week follow-up appointment for medication management.     Chief Complaint: Insomnia, anxiety, skin picking, OCD symptoms    History of Present Illness:   Yuni reports an improvement in her mood, motivation and anxiety. She  reports she has the energy to apply make-up  and ventures outside her home on occasion.  She states, \"I feel like I am getting my life back.  \"Sleep quality is unchanged and remains poor.  She is unable to initiate or maintain sleep.  Sleep duration is 4-5 hours. EMR reflects  a six pound weight increase in the last 2 weeks. Yuni believes her weight gain is associated to regaining her appetite after she stopped Trintellix. She took the Anafranil for about 10 days and stopped  because it did not improve her sleep. Patient advised of the need to maintain consistency over prolonged period of time to accurately assess efficacy. She reports her compulsion to pick the skin on the back of her legs is somewhat improved, but remains bothersome.Yuni's PHQ score is 11. She reports the following symptoms of depression: anhedonia, depression, insomnia, fatigue, poor self-esteem, poor concentration.  Her JUSTUS score is 12.  Problematic areas include trouble relaxing, restlessness, feeling nervous, inability to control worry, generalized worry and irritability.  Sources of anxiety are varied and include her mother, her boys leaving the , empty nest syndrome, and her tendency to over clean.  Symptoms of depression and anxiety impair Yuni's daily function. The patient has frequent recurrent and persistent thoughts and urges, toward cleanliness and organization that cause marked anxiety or distress.  Yuni attempts to ignore or suppress her urges to clean or to neutralize them with some other thought or action but find this more distressing.  She reports often cleaning until her hands are raw.       . The following portions of the " patient's history were reviewed and updated as appropriate: allergies, current medications, past family history, past medical history, past social history, past surgical history and problem list.    Past Medical History:  Past Medical History:   Diagnosis Date   • HTN (hypertension)      Social History:  Social History     Socioeconomic History   • Marital status:      Spouse name: Not on file   • Number of children: Not on file   • Years of education: Not on file   • Highest education level: Not on file   Tobacco Use   • Smoking status: Never Smoker   • Smokeless tobacco: Never Used   Substance and Sexual Activity   • Alcohol use: Never     Frequency: Never   • Drug use: Never     Family History:  Family History   Problem Relation Age of Onset   • Diabetes Mother    • Heart disease Father      Past Surgical History:  Past Surgical History:   Procedure Laterality Date   • BREAST SURGERY      lift   • GALLBLADDER SURGERY     • STOMACH SURGERY      tummy tuck   • TUBAL ABDOMINAL LIGATION       Problem List:  Patient Active Problem List   Diagnosis   • Abdominal wall bulge     Allergy:   Allergies   Allergen Reactions   • Latex Hives      Current Medications:   Current Outpatient Medications   Medication Sig Dispense Refill   • clomiPRAMINE (ANAFRANIL) 25 MG capsule Take 1 capsule by mouth Every Night. Monitor for sedation, constipation 30 capsule 0   • cyanocobalamin 1000 MCG/ML injection      • Diclofenac Sodium (VOLTAREN) 1 % gel gel diclofenac 1 % topical gel     • fexofenadine (ALLEGRA) 180 MG tablet      • fluticasone (FLONASE) 50 MCG/ACT nasal spray fluticasone propionate 50 mcg/actuation nasal spray,suspension     • hydroCHLOROthiazide (HYDRODIURIL) 25 MG tablet Take 25 mg by mouth Daily.     • lithium 300 MG tablet Take 1.5 tablets by mouth 2 (two) times a day for 30 days. 90 tablet 0   • losartan (COZAAR) 50 MG tablet Take 50 mg by mouth Daily.     • senna (Senokot) 8.6 MG tablet Take 2 tablets by  "mouth Daily As Needed for Constipation. 60 tablet 0   • silver sulfadiazine (SILVADENE, SSD) 1 % cream Apply  topically to the appropriate area as directed 2 (Two) Times a Day.     • vitamin B-12 (CYANOCOBALAMIN) 1000 MCG tablet Take 1,000 mcg by mouth Daily.       No current facility-administered medications for this visit.      Review of Symptoms:    Review of Systems   Constitutional: Positive for activity change, appetite change and unexpected weight gain.   Musculoskeletal: Positive for arthralgias and back pain.   Skin: Positive for skin lesions.   Neurological: Positive for memory problem.   Psychiatric/Behavioral: Positive for decreased concentration and sleep disturbance. The patient is nervous/anxious.    All other systems reviewed and are negative.    Physical Exam:   Blood pressure 128/82, pulse 79, height 162.6 cm (64.02\"), weight 94.9 kg (209 lb 3.2 oz).  Body mass index is 35.89 kg/m².    Appearance: Well groomed, hair combed, wearing make-up  Gait, Station, Strength: posture upright, gait steady    Mental Status Exam:   Hygiene:   good  Cooperation:  Cooperative  Eye Contact:  Good  Psychomotor Behavior:  Appropriate  Affect:  Appropriate  Mood: normal  Hopelessness: 3  Speech:  Normal  Thought Process:  Goal directed and Linear  Thought Content:  Normal  Suicidal:  None  Homicidal:  None  Hallucinations:  None  Delusion:  None  Memory:  Deficits  Orientation:  Person, Place, Time and Situation  Reliability:  good  Insight:  Fair  Judgement:  Fair  Impulse Control:  Good  Physical/Medical Issues:        Lab Results: Labs pulled in after pt visit  Lab on 01/30/2021   Component Date Value Ref Range Status   • Lithium 01/30/2021 0.8  0.6 - 1.2 mmol/L Final   Office Visit on 01/22/2021   Component Date Value Ref Range Status   • External Amphetamine Screen Urine 01/22/2021 Negative   Final   • Amphetamine Cut-Off 01/22/2021 1000ng/ml   Final   • External Benzodiazepine Screen Uri* 01/22/2021 Negative   " Final   • Benzodiazipine Cut-Off 01/22/2021 300ng/ml   Final   • External Cocaine Screen Urine 01/22/2021 Negative   Final   • Cocaine Cut-Off 01/22/2021 300ng/ml   Final   • External THC Screen Urine 01/22/2021 Negative   Final   • THC Cut-Off 01/22/2021 50ng/ml   Final   • External Methadone Screen Urine 01/22/2021 Negative   Final   • Methadone Cut-Off 01/22/2021 300ng/ml   Final   • External Methamphetamine Screen Ur* 01/22/2021 Negative   Final   • Methamphetamine Cut-Off 01/22/2021 1000ng/ml   Final   • External Oxycodone Screen Urine 01/22/2021 Negative   Final   • Oxycodone Cut-Off 01/22/2021 100ng/ml   Final   • External Buprenorphine Screen Urine 01/22/2021 Negative   Final   • Buprenorphine Cut-Off 01/22/2021 10ng/ml   Final   • External MDMA 01/22/2021 Negative   Final   • MDMA Cut-Off 01/22/2021 500ng/ml   Final   • External Opiates Screen Urine 01/22/2021 Negative   Final   • Opiates Cut-Off 01/22/2021 300ng/ml   Final   Lab on 01/21/2021   Component Date Value Ref Range Status   • Lithium 01/21/2021 0.4* 0.6 - 1.2 mmol/L Final   Lab on 01/08/2021   Component Date Value Ref Range Status   • Lithium 01/08/2021 0.2* 0.6 - 1.2 mmol/L Final   • Glucose 01/08/2021 99  65 - 99 mg/dL Final   • BUN 01/08/2021 8  6 - 20 mg/dL Final   • Creatinine 01/08/2021 0.79  0.57 - 1.00 mg/dL Final   • Sodium 01/08/2021 137  136 - 145 mmol/L Final   • Potassium 01/08/2021 3.7  3.5 - 5.2 mmol/L Final   • Chloride 01/08/2021 100  98 - 107 mmol/L Final   • CO2 01/08/2021 27.0  22.0 - 29.0 mmol/L Final   • Calcium 01/08/2021 9.3  8.6 - 10.5 mg/dL Final   • Total Protein 01/08/2021 7.7  6.0 - 8.5 g/dL Final   • Albumin 01/08/2021 4.70  3.50 - 5.20 g/dL Final   • ALT (SGPT) 01/08/2021 15  1 - 33 U/L Final   • AST (SGOT) 01/08/2021 20  1 - 32 U/L Final   • Alkaline Phosphatase 01/08/2021 81  39 - 117 U/L Final   • Total Bilirubin 01/08/2021 0.6  0.0 - 1.2 mg/dL Final   • eGFR Non  Amer 01/08/2021 80  >60 mL/min/1.73 Final    • Globulin 01/08/2021 3.0  gm/dL Final   • A/G Ratio 01/08/2021 1.6  g/dL Final   • BUN/Creatinine Ratio 01/08/2021 10.1  7.0 - 25.0 Final   • Anion Gap 01/08/2021 10.0  5.0 - 15.0 mmol/L Final   • 1,25-Dihydroxy, Vitamin D 01/08/2021 44.2  19.9 - 79.3 pg/mL Final   • Total Cholesterol 01/08/2021 174  0 - 200 mg/dL Final   • Triglycerides 01/08/2021 185* 0 - 150 mg/dL Final   • HDL Cholesterol 01/08/2021 45  40 - 60 mg/dL Final   • LDL Cholesterol  01/08/2021 97  0 - 100 mg/dL Final   • VLDL Cholesterol 01/08/2021 32  5 - 40 mg/dL Final   • LDL/HDL Ratio 01/08/2021 2.04   Final   • WBC 01/08/2021 11.78* 3.40 - 10.80 10*3/mm3 Final   • RBC 01/08/2021 4.93  3.77 - 5.28 10*6/mm3 Final   • Hemoglobin 01/08/2021 14.4  12.0 - 15.9 g/dL Final   • Hematocrit 01/08/2021 43.2  34.0 - 46.6 % Final   • MCV 01/08/2021 87.6  79.0 - 97.0 fL Final   • MCH 01/08/2021 29.2  26.6 - 33.0 pg Final   • MCHC 01/08/2021 33.3  31.5 - 35.7 g/dL Final   • RDW 01/08/2021 13.1  12.3 - 15.4 % Final   • RDW-SD 01/08/2021 41.6  37.0 - 54.0 fl Final   • MPV 01/08/2021 10.9  6.0 - 12.0 fL Final   • Platelets 01/08/2021 376  140 - 450 10*3/mm3 Final   • Neutrophil % 01/08/2021 69.3  42.7 - 76.0 % Final   • Lymphocyte % 01/08/2021 22.8  19.6 - 45.3 % Final   • Monocyte % 01/08/2021 5.4  5.0 - 12.0 % Final   • Eosinophil % 01/08/2021 1.6  0.3 - 6.2 % Final   • Basophil % 01/08/2021 0.5  0.0 - 1.5 % Final   • Immature Grans % 01/08/2021 0.4  0.0 - 0.5 % Final   • Neutrophils, Absolute 01/08/2021 8.16* 1.70 - 7.00 10*3/mm3 Final   • Lymphocytes, Absolute 01/08/2021 2.68  0.70 - 3.10 10*3/mm3 Final   • Monocytes, Absolute 01/08/2021 0.64  0.10 - 0.90 10*3/mm3 Final   • Eosinophils, Absolute 01/08/2021 0.19  0.00 - 0.40 10*3/mm3 Final   • Basophils, Absolute 01/08/2021 0.06  0.00 - 0.20 10*3/mm3 Final   • Immature Grans, Absolute 01/08/2021 0.05  0.00 - 0.05 10*3/mm3 Final   • nRBC 01/08/2021 0.0  0.0 - 0.2 /100 WBC Final   • Bilirubin, Direct 01/08/2021  <0.2  0.0 - 0.3 mg/dL Final   Office Visit on 01/08/2021   Component Date Value Ref Range Status   • External Amphetamine Screen Urine 01/08/2021 Positive   Final   • Amphetamine Cut-Off 01/08/2021 1000NG/ML   Final   • External Benzodiazepine Screen Uri* 01/08/2021 Negative   Final   • Benzodiazipine Cut-Off 01/08/2021 300NG/ML   Final   • External Cocaine Screen Urine 01/08/2021 Negative   Final   • Cocaine Cut-Off 01/08/2021 300NG/ML   Final   • External THC Screen Urine 01/08/2021 Negative   Final   • THC Cut-Off 01/08/2021 50NG/ML   Final   • External Methadone Screen Urine 01/08/2021 Negative   Final   • Methadone Cut-Off 01/08/2021 300NG/ML   Final   • External Methamphetamine Screen Ur* 01/08/2021 Negative   Final   • Methamphetamine Cut-Off 01/08/2021 1000NG/ML   Final   • External Oxycodone Screen Urine 01/08/2021 Negative   Final   • Oxycodone Cut-Off 01/08/2021 100NG/ML   Final   • External Buprenorphine Screen Urine 01/08/2021 Negative   Final   • Buprenorphine Cut-Off 01/08/2021 10NG/ML   Final   • External MDMA 01/08/2021 Negative   Final   • MDMA Cut-Off 01/08/2021 500NG/ML   Final   • External Opiates Screen Urine 01/08/2021 Negative   Final   • Opiates Cut-Off 01/08/2021 300NG/ML   Final   • Folate 01/08/2021 >20.00  4.78 - 24.20 ng/mL Final   • Vitamin B-12 01/08/2021 1,289* 211 - 946 pg/mL Final   • TSH 01/08/2021 2.400  0.270 - 4.200 uIU/mL Final   • Free T4 01/08/2021 1.23  0.93 - 1.70 ng/dL Final     Assessment/Plan   Diagnoses and all orders for this visit:    1. Insomnia due to mental disorder (Primary)  -     clomiPRAMINE (ANAFRANIL) 25 MG capsule; Take 1 capsule by mouth Every Night for 7 days, THEN 2 capsules Every Night for 21 days. Monitor for sedation, constipation  Dispense: 52 capsule; Refill: 0    2. JUSTUS (generalized anxiety disorder)  -     clomiPRAMINE (ANAFRANIL) 25 MG capsule; Take 1 capsule by mouth Every Night for 7 days, THEN 2 capsules Every Night for 21 days. Monitor for  sedation, constipation  Dispense: 52 capsule; Refill: 0    3. Compulsive skin picking  -     clomiPRAMINE (ANAFRANIL) 25 MG capsule; Take 1 capsule by mouth Every Night for 7 days, THEN 2 capsules Every Night for 21 days. Monitor for sedation, constipation  Dispense: 52 capsule; Refill: 0    4. Major depressive disorder, recurrent episode, moderate (CMS/HCC)  -     lithium 300 MG tablet; Take 1.5 tablets by mouth 2 (two) times a day.  Dispense: 90 tablet; Refill: 1  -     clomiPRAMINE (ANAFRANIL) 25 MG capsule; Take 1 capsule by mouth Every Night for 7 days, THEN 2 capsules Every Night for 21 days. Monitor for sedation, constipation  Dispense: 52 capsule; Refill: 0    5. Mixed obsessional thoughts and acts  -     clomiPRAMINE (ANAFRANIL) 25 MG capsule; Take 1 capsule by mouth Every Night for 7 days, THEN 2 capsules Every Night for 21 days. Monitor for sedation, constipation  Dispense: 52 capsule; Refill: 0    6. Encounter for lithium monitoring  -     Lithium Level; Future    Other orders  -     senna (Senokot) 8.6 MG tablet; Take 2 tablets by mouth Daily As Needed for Constipation.  Dispense: 60 tablet; Refill: 0  Promote bowel motility while on clomipramine    7. Medication management    Medication  treatment options discussed.  Yuni's priorities for treatment today include insomnia and OCD symptoms.  Options for consideration include Zoloft ,mirtazapine, continuing clomipramine with plan to increase dose to 50 mg in 7 days..  Patient advised that clomipramine would likely treat more symptoms such as OCD, anxiety, depression, and insomnia.      -Lithium Level; Future order to monitor level due to concomitant use with HCTZ and Cozaar  increasing the risk of toxicity; Patient aware of signs/symptoms of lithium toxicity and agrees to monitor for symptoms.     -Maintain lithium at 450 mg p.o. twice daily and monitor for signs of lithium toxicity. Patient reminded to ensure adequate hydration, use only Tylenol as OTC  pain relief and replace any fluid lost through sweating,emesis or diarrhea  1/30//21 lithium level 0.8 discussed with patient. Conversation regarding plan to treat symptoms of depression with lowest dose of lithium possible to preserve thryroid/renal function and lower risk of toxicity.     -Restart clomipramine at 25 mg p.o. at bedtime for 7 days; increase to 50 mg p.o. nightly on day 8 if tolerated at lower dose. Goal of treatment  to reduce intrusive thoughts, anxiety, depression and improve sleep. Provider reminded patient that although medication may not induce sleep, as intrusive thoughts and anxiety subside, sleep should improve. Clomipramine utilized due to failed trials of Prozac    - Senokot- 8.6 mg;  tabs daily as needed to prevent constipation often associated with Clomipramine; patient reports remarkable improvement in bowel regularity since taking senokot    - Encouraged psychotherapy to help with coping skills and grief    Jimmy report reviewed and appropriate  -Obtain lithium level just prior to follow-up visit in 1 month.  -Records reviewed include; [facility] note dated 1/22/2021, labs dated 1/8/2021, lithium levels dated 1/21/2021, and 1/30/2021  -She is a non-smoker.    Visit Diagnoses:  .    ICD-10-CM ICD-9-CM   1. Insomnia due to mental disorder  F51.05 300.9     327.02   2. JUSTUS (generalized anxiety disorder)  F41.1 300.02   3. Compulsive skin picking  F42.4 312.39   4. Major depressive disorder, recurrent episode, moderate (CMS/HCC)  F33.1 296.32   5. Mixed obsessional thoughts and acts  F42.2 300.3   6. Encounter for lithium monitoring  Z51.81 V58.83    Z79.899 V58.69   7. Medication management  Z79.899 V58.69     TREATMENT PLAN/GOALS:   Short Term  1. Pt will keep all appts for med mgt   2. Pt will take medications as prescribed and report intolerable side effects  3. Pt will pursue psychotherapy services to help gain coping skill and process grief    Long term:   1. Pt will exhibit emotional  stability  2. Pt will use coping skills to work through depression  3. Pt will manage obsessional thoughts and behaviors    MEDICATION ISSUES:  Discussed medication options and treatment plan of prescribed medication as well as the risks, benefits, and side effects including potential falls, possible impaired driving and metabolic adversities among others. Patient is agreeable to call the office with any worsening of symptoms or onset of side effects. Patient is agreeable to call 911 or go to the nearest ER should he/she begin having SI/HI.     MEDS ORDERED DURING VISIT:  New Medications Ordered This Visit   Medications   • senna (Senokot) 8.6 MG tablet     Sig: Take 2 tablets by mouth Daily As Needed for Constipation.     Dispense:  60 tablet     Refill:  0   • lithium 300 MG tablet     Sig: Take 1.5 tablets by mouth 2 (two) times a day.     Dispense:  90 tablet     Refill:  1   • clomiPRAMINE (ANAFRANIL) 25 MG capsule     Sig: Take 1 capsule by mouth Every Night for 7 days, THEN 2 capsules Every Night for 21 days. Monitor for sedation, constipation     Dispense:  52 capsule     Refill:  0     Return in about 4 weeks (around 3/5/2021), or if symptoms worsen or fail to improve.       This document has been electronically signed by KRISHNA Rivers   February 5, 2021 10:29 EST    Part of this note may be an electronic transcription/translation of spoken language to printed text using the Dragon Dictation System.

## 2021-03-05 ENCOUNTER — TELEPHONE (OUTPATIENT)
Dept: PSYCHIATRY | Facility: CLINIC | Age: 41
End: 2021-03-05

## 2021-03-05 NOTE — TELEPHONE ENCOUNTER
called to inquire of patient's missed visit and remind her of importance of having lithium level drawn ASAP. Call when to voicemail after several rings. Voicemail was not personalized stating patients name, but automated. No message left.

## 2021-03-12 ENCOUNTER — TELEPHONE (OUTPATIENT)
Dept: PSYCHIATRY | Facility: CLINIC | Age: 41
End: 2021-03-12

## 2021-03-12 NOTE — TELEPHONE ENCOUNTER
Attempted to reach patient regarding her lithium levels and missed appointment. Left message for her to get in touch with me to schedule a follow up.

## 2021-12-27 ENCOUNTER — HOSPITAL ENCOUNTER (EMERGENCY)
Facility: HOSPITAL | Age: 41
Discharge: HOME | End: 2021-12-27
Attending: EMERGENCY MEDICINE | Admitting: EMERGENCY MEDICINE

## 2021-12-27 VITALS
SYSTOLIC BLOOD PRESSURE: 133 MMHG | BODY MASS INDEX: 37.89 KG/M2 | TEMPERATURE: 97.5 F | RESPIRATION RATE: 20 BRPM | HEIGHT: 60 IN | DIASTOLIC BLOOD PRESSURE: 68 MMHG | OXYGEN SATURATION: 99 % | WEIGHT: 193 LBS | HEART RATE: 72 BPM

## 2021-12-27 DIAGNOSIS — E86.0 DEHYDRATION: Primary | ICD-10-CM

## 2021-12-27 LAB
ALBUMIN SERPL-MCNC: 4.01 G/DL (ref 3.5–5.2)
ALBUMIN/GLOB SERPL: 1.5 G/DL
ALP SERPL-CCNC: 87 U/L (ref 39–117)
ALT SERPL W P-5'-P-CCNC: 13 U/L (ref 1–33)
ANION GAP SERPL CALCULATED.3IONS-SCNC: 11.7 MMOL/L (ref 5–15)
AST SERPL-CCNC: 17 U/L (ref 1–32)
BASOPHILS # BLD AUTO: 0.05 10*3/MM3 (ref 0–0.2)
BASOPHILS NFR BLD AUTO: 0.6 % (ref 0–1.5)
BILIRUB SERPL-MCNC: 0.2 MG/DL (ref 0–1.2)
BUN SERPL-MCNC: 12 MG/DL (ref 6–20)
BUN/CREAT SERPL: 13.6 (ref 7–25)
CALCIUM SPEC-SCNC: 9 MG/DL (ref 8.6–10.5)
CHLORIDE SERPL-SCNC: 104 MMOL/L (ref 98–107)
CO2 SERPL-SCNC: 26.3 MMOL/L (ref 22–29)
CREAT SERPL-MCNC: 0.88 MG/DL (ref 0.57–1)
CRP SERPL-MCNC: <0.3 MG/DL (ref 0–0.5)
DEPRECATED RDW RBC AUTO: 46.4 FL (ref 37–54)
EOSINOPHIL # BLD AUTO: 0.24 10*3/MM3 (ref 0–0.4)
EOSINOPHIL NFR BLD AUTO: 2.7 % (ref 0.3–6.2)
ERYTHROCYTE [DISTWIDTH] IN BLOOD BY AUTOMATED COUNT: 14.7 % (ref 12.3–15.4)
GFR SERPL CREATININE-BSD FRML MDRD: 70 ML/MIN/1.73
GLOBULIN UR ELPH-MCNC: 2.6 GM/DL
GLUCOSE SERPL-MCNC: 92 MG/DL (ref 65–99)
HCT VFR BLD AUTO: 40.4 % (ref 34–46.6)
HGB BLD-MCNC: 13.3 G/DL (ref 12–15.9)
HOLD SPECIMEN: NORMAL
HOLD SPECIMEN: NORMAL
IMM GRANULOCYTES # BLD AUTO: 0.01 10*3/MM3 (ref 0–0.05)
IMM GRANULOCYTES NFR BLD AUTO: 0.1 % (ref 0–0.5)
LIPASE SERPL-CCNC: 39 U/L (ref 13–60)
LYMPHOCYTES # BLD AUTO: 2.75 10*3/MM3 (ref 0.7–3.1)
LYMPHOCYTES NFR BLD AUTO: 31.5 % (ref 19.6–45.3)
MCH RBC QN AUTO: 28.7 PG (ref 26.6–33)
MCHC RBC AUTO-ENTMCNC: 32.9 G/DL (ref 31.5–35.7)
MCV RBC AUTO: 87.3 FL (ref 79–97)
MONOCYTES # BLD AUTO: 0.72 10*3/MM3 (ref 0.1–0.9)
MONOCYTES NFR BLD AUTO: 8.2 % (ref 5–12)
NEUTROPHILS NFR BLD AUTO: 4.96 10*3/MM3 (ref 1.7–7)
NEUTROPHILS NFR BLD AUTO: 56.9 % (ref 42.7–76)
NRBC BLD AUTO-RTO: 0 /100 WBC (ref 0–0.2)
PLATELET # BLD AUTO: 288 10*3/MM3 (ref 140–450)
PMV BLD AUTO: 9.8 FL (ref 6–12)
POTASSIUM SERPL-SCNC: 3.5 MMOL/L (ref 3.5–5.2)
PROT SERPL-MCNC: 6.6 G/DL (ref 6–8.5)
RBC # BLD AUTO: 4.63 10*6/MM3 (ref 3.77–5.28)
SODIUM SERPL-SCNC: 142 MMOL/L (ref 136–145)
WBC NRBC COR # BLD: 8.73 10*3/MM3 (ref 3.4–10.8)
WHOLE BLOOD HOLD SPECIMEN: NORMAL
WHOLE BLOOD HOLD SPECIMEN: NORMAL

## 2021-12-27 PROCEDURE — 85025 COMPLETE CBC W/AUTO DIFF WBC: CPT | Performed by: PHYSICIAN ASSISTANT

## 2021-12-27 PROCEDURE — 80053 COMPREHEN METABOLIC PANEL: CPT | Performed by: PHYSICIAN ASSISTANT

## 2021-12-27 PROCEDURE — 86140 C-REACTIVE PROTEIN: CPT | Performed by: PHYSICIAN ASSISTANT

## 2021-12-27 PROCEDURE — 99283 EMERGENCY DEPT VISIT LOW MDM: CPT

## 2021-12-27 PROCEDURE — 83690 ASSAY OF LIPASE: CPT | Performed by: PHYSICIAN ASSISTANT

## 2021-12-27 PROCEDURE — 96374 THER/PROPH/DIAG INJ IV PUSH: CPT

## 2021-12-27 PROCEDURE — 25010000002 ONDANSETRON PER 1 MG: Performed by: PHYSICIAN ASSISTANT

## 2021-12-27 RX ORDER — SODIUM CHLORIDE 0.9 % (FLUSH) 0.9 %
10 SYRINGE (ML) INJECTION AS NEEDED
Status: DISCONTINUED | OUTPATIENT
Start: 2021-12-27 | End: 2021-12-27 | Stop reason: HOSPADM

## 2021-12-27 RX ORDER — ONDANSETRON 2 MG/ML
4 INJECTION INTRAMUSCULAR; INTRAVENOUS ONCE
Status: COMPLETED | OUTPATIENT
Start: 2021-12-27 | End: 2021-12-27

## 2021-12-27 RX ADMIN — ONDANSETRON 4 MG: 2 INJECTION INTRAMUSCULAR; INTRAVENOUS at 12:41

## 2021-12-27 RX ADMIN — SODIUM CHLORIDE 1500 ML: 9 INJECTION, SOLUTION INTRAVENOUS at 12:40

## 2021-12-27 RX ADMIN — SODIUM CHLORIDE 1500 ML: 9 INJECTION, SOLUTION INTRAVENOUS at 14:08

## 2021-12-27 NOTE — ED PROVIDER NOTES
Subjective   41 year old female with past medical hx of HTN and 6 weeks post gastric sleeve presents to the ED with nausea and vomiting. Patient states she had weight loss surgery one month ago and has not been able to keep her liquids in and thinks she's dehydrated. States she has been vomiting at least once daily for the past 2 weeks. Denies any other complaints or concerns at this time.      History provided by:  Patient   used: No        Review of Systems   Constitutional: Negative.  Negative for fever.   HENT: Negative.    Respiratory: Negative.    Cardiovascular: Negative.  Negative for chest pain.   Gastrointestinal: Positive for nausea and vomiting. Negative for abdominal pain.   Endocrine: Negative.    Genitourinary: Negative.  Negative for dysuria.   Skin: Negative.    Neurological: Negative.    Psychiatric/Behavioral: Negative.    All other systems reviewed and are negative.      Past Medical History:   Diagnosis Date   • HTN (hypertension)        Allergies   Allergen Reactions   • Latex Hives   • Bactrim [Sulfamethoxazole-Trimethoprim] Rash       Past Surgical History:   Procedure Laterality Date   • BARIATRIC SURGERY     • BREAST SURGERY      lift   • GALLBLADDER SURGERY     • STOMACH SURGERY      tummy tuck   • TUBAL ABDOMINAL LIGATION         Family History   Problem Relation Age of Onset   • Diabetes Mother    • Heart disease Father        Social History     Socioeconomic History   • Marital status:    Tobacco Use   • Smoking status: Never Smoker   • Smokeless tobacco: Never Used   Vaping Use   • Vaping Use: Never used   Substance and Sexual Activity   • Alcohol use: Never   • Drug use: Never           Objective   Physical Exam  Vitals and nursing note reviewed.   Constitutional:       General: She is not in acute distress.     Appearance: She is well-developed. She is not diaphoretic.   HENT:      Head: Normocephalic and atraumatic.      Right Ear: External ear normal.       Left Ear: External ear normal.      Nose: Nose normal.   Eyes:      Conjunctiva/sclera: Conjunctivae normal.      Pupils: Pupils are equal, round, and reactive to light.   Neck:      Vascular: No JVD.      Trachea: No tracheal deviation.   Cardiovascular:      Rate and Rhythm: Normal rate and regular rhythm.      Heart sounds: Normal heart sounds. No murmur heard.      Pulmonary:      Effort: Pulmonary effort is normal. No respiratory distress.      Breath sounds: Normal breath sounds. No wheezing.   Abdominal:      General: Bowel sounds are normal.      Palpations: Abdomen is soft.      Tenderness: There is no abdominal tenderness.   Musculoskeletal:         General: No deformity. Normal range of motion.      Cervical back: Normal range of motion and neck supple.   Skin:     General: Skin is warm and dry.      Coloration: Skin is not pale.      Findings: No erythema or rash.   Neurological:      Mental Status: She is alert and oriented to person, place, and time.      Cranial Nerves: No cranial nerve deficit.   Psychiatric:         Behavior: Behavior normal.         Thought Content: Thought content normal.         Procedures           ED Course                                                 MDM  Number of Diagnoses or Management Options  Dehydration: new and requires workup     Amount and/or Complexity of Data Reviewed  Clinical lab tests: reviewed and ordered    Risk of Complications, Morbidity, and/or Mortality  Presenting problems: moderate  Diagnostic procedures: low  Management options: moderate    Patient Progress  Patient progress: stable      Final diagnoses:   Dehydration       ED Disposition  ED Disposition     ED Disposition Condition Comment    Discharge Stable           Ace Trinidad MD  [address] [place] [id number]  [phone]    In 2 days           Medication List      No changes were made to your prescriptions during this visit.          Grant Moran PADEVORAH  12/28/21  [id number]

## 2023-09-14 ENCOUNTER — OFFICE VISIT (OUTPATIENT)
Dept: PSYCHIATRY | Facility: CLINIC | Age: 43
End: 2023-09-14
Payer: COMMERCIAL

## 2023-09-14 VITALS
HEART RATE: 79 BPM | DIASTOLIC BLOOD PRESSURE: 76 MMHG | SYSTOLIC BLOOD PRESSURE: 124 MMHG | BODY MASS INDEX: 37.62 KG/M2 | WEIGHT: 191.6 LBS | HEIGHT: 60 IN

## 2023-09-14 DIAGNOSIS — Z79.899 MEDICATION MANAGEMENT: ICD-10-CM

## 2023-09-14 DIAGNOSIS — F33.2 SEVERE EPISODE OF RECURRENT MAJOR DEPRESSIVE DISORDER, WITHOUT PSYCHOTIC FEATURES: Primary | ICD-10-CM

## 2023-09-14 DIAGNOSIS — F41.1 GENERALIZED ANXIETY DISORDER: ICD-10-CM

## 2023-09-14 LAB
EXTERNAL AMPHETAMINE SCREEN URINE: NEGATIVE
EXTERNAL BENZODIAZEPINE SCREEN URINE: NEGATIVE
EXTERNAL BUPRENORPHINE SCREEN URINE: NEGATIVE
EXTERNAL COCAINE SCREEN URINE: NEGATIVE
EXTERNAL MDMA: NEGATIVE
EXTERNAL METHADONE SCREEN URINE: NEGATIVE
EXTERNAL METHAMPHETAMINE SCREEN URINE: NEGATIVE
EXTERNAL OPIATES SCREEN URINE: POSITIVE
EXTERNAL OXYCODONE SCREEN URINE: NEGATIVE
EXTERNAL THC SCREEN URINE: NEGATIVE

## 2023-09-14 RX ORDER — DESVENLAFAXINE SUCCINATE 50 MG/1
50 TABLET, EXTENDED RELEASE ORAL DAILY
Qty: 30 TABLET | Refills: 0 | Status: SHIPPED | OUTPATIENT
Start: 2023-09-14

## 2023-09-14 RX ORDER — HYDROGEN PEROXIDE 2.65 ML/100ML
1 LIQUID ORAL; TOPICAL DAILY
COMMUNITY
Start: 2023-08-17

## 2023-09-14 RX ORDER — METHOCARBAMOL 750 MG/1
TABLET, FILM COATED ORAL
COMMUNITY
Start: 2023-07-13

## 2023-09-14 RX ORDER — PHENAZOPYRIDINE HYDROCHLORIDE 100 MG/1
TABLET, FILM COATED ORAL
COMMUNITY
Start: 2023-09-12

## 2023-09-14 RX ORDER — LEVOFLOXACIN 500 MG/1
TABLET, FILM COATED ORAL
COMMUNITY

## 2023-09-14 RX ORDER — AMLODIPINE BESYLATE 5 MG/1
TABLET ORAL
COMMUNITY
Start: 2023-08-29

## 2023-09-14 RX ORDER — FLUOXETINE HYDROCHLORIDE 20 MG/1
20 CAPSULE ORAL EVERY MORNING
COMMUNITY
Start: 2023-07-13 | End: 2023-09-14

## 2023-09-14 RX ORDER — FERROUS SULFATE TAB EC 324 MG (65 MG FE EQUIVALENT) 324 (65 FE) MG
1 TABLET DELAYED RESPONSE ORAL DAILY
COMMUNITY
Start: 2023-07-13

## 2023-09-14 RX ORDER — FUROSEMIDE 40 MG/1
40 TABLET ORAL DAILY
COMMUNITY

## 2023-09-14 RX ORDER — PREGABALIN 50 MG/1
3 CAPSULE ORAL EVERY 12 HOURS SCHEDULED
COMMUNITY
Start: 2023-09-01

## 2023-09-14 RX ORDER — BUPROPION HYDROCHLORIDE 300 MG/1
1 TABLET ORAL DAILY
COMMUNITY
Start: 2023-09-06

## 2023-09-14 RX ORDER — HYDROCODONE BITARTRATE AND ACETAMINOPHEN 7.5; 325 MG/1; MG/1
1 TABLET ORAL EVERY 8 HOURS PRN
COMMUNITY
Start: 2023-09-12

## 2023-09-14 RX ORDER — FLUCONAZOLE 150 MG/1
TABLET ORAL
COMMUNITY

## 2023-09-14 RX ORDER — LEVOTHYROXINE SODIUM 0.15 MG/1
150 TABLET ORAL DAILY
COMMUNITY

## 2023-09-14 RX ORDER — CLONIDINE HYDROCHLORIDE 0.3 MG/1
1 TABLET ORAL DAILY
COMMUNITY
Start: 2023-07-27

## 2023-09-14 RX ORDER — NITROGLYCERIN 0.4 MG/1
0.4 TABLET SUBLINGUAL
COMMUNITY
Start: 2023-09-12

## 2023-09-14 RX ORDER — ESOMEPRAZOLE MAGNESIUM 40 MG/1
1 CAPSULE, DELAYED RELEASE ORAL DAILY
COMMUNITY
Start: 2023-09-06

## 2023-09-14 NOTE — PROGRESS NOTES
Subjective     Yuni Garcia is a 43 y.o. female who presents today for initial evaluation     Chief Complaint: Depression       History of Present Illness:    History of Present Illness  Yuni is a 43-year-old female who presents today for an initial evaluation.  She tells me that she has been suffering from depression and anxiety for the last 3 years.  She was seen here briefly in 2021 by Diann Benoit and Kandis Sneed. She tells me that 3 years ago her dog, who she says was like her child, passed away very suddenly after a diagnosis of cancer. She tells me that 2 months later, her father was diagnosed with COVID while in a nursing home and passed away. She tells me that she feels guilty because they couldn't see him for a month prior to his death. She tells me that she has tried many different antidepressants, mood stabilizing medications, and adjunctive treatments for anxiety and depression.  She shares with me a list of symptoms that she had written down over the past week and endorses feelings of depression, hypersomnia, decreased appetite, skin picking, nail biting, isolation, poor motivation, fatigue, little energy, loss of interest, and worry.  She tells me that she also has a stuffed animal with her white dogs as she is that she carries around and sleeps with at night.  Tells me that she can even bring herself to do laundry or other housework.  She states that all she wants to do is stay in bed alone.  She tells me that she and her  have also been sleeping in separate beds lately.  She tells me that her mom is in kidney failure and worries a lot about her but she also tells me that she will ignore her phone calls because she does not have the energy to talk.  She also reports compulsively picking her skin and shows me sores on her legs and chest.  She tells me that she wishes she could just feel like a normal human again.  Her depression is severe but she denies having any suicidal thoughts.   She denies any self-harm or suicide attempts in the past.  No inpatient psychiatric hospitalizations.  She tells me that her 2 sons also moved out a couple of years ago and have their own lives now.  She tells me that one of them is a  and 1 is a .  She states that she is proud of them both but feels like this has caused more isolation and depression.  She reports having a stroke in 2015 and tells me that her life is never been the same and that she has not felt right since.  She denies any current SI/HI/AVH.     The following portions of the patient's history were reviewed and updated as appropriate: allergies, current medications, past family history, past medical history, past social history, past surgical history and problem list.    Past Psychiatric History:  Began Treatment: Several years ago.  She has been primarily treated from her primary care provider but has seen two PMHNP's for 1 visit each.  Diagnoses:Depression, Anxiety, and OCD, insomnia  Psychiatrist: Has seen Kandis Sneed and Amadoe here in the past.  Therapist:Denies  Admission History:Denies  Medication Trials: zoloft, lexapro, celexa, effexor, cymbalta, prozac, wellbutrin, vraylar, caplyta, abilify,   Self Harm: Denies  Suicide Attempts:Denies   Psychosis, Anxiety, Depression: Denies    Past Medical History:  Past Medical History:   Diagnosis Date    HTN (hypertension)        Substance Abuse History:   Types:Denies all, including illicit  Withdrawal Symptoms:Denies  Longest Period Sober:Not Applicable   AA: Not applicable     Social History:  Social History     Socioeconomic History    Marital status:    Tobacco Use    Smoking status: Never    Smokeless tobacco: Never   Vaping Use    Vaping Use: Never used   Substance and Sexual Activity    Alcohol use: Never    Drug use: Never    Sexual activity: Defer       Family History:  Family History   Problem Relation Age of Onset    Diabetes Mother     Heart  disease Father        Past Surgical History:  Past Surgical History:   Procedure Laterality Date    BARIATRIC SURGERY      BREAST SURGERY      lift    GALLBLADDER SURGERY      STOMACH SURGERY      tummy tuck    TUBAL ABDOMINAL LIGATION         Problem List:  Patient Active Problem List   Diagnosis    Abdominal wall bulge       Allergy:   Allergies   Allergen Reactions    Latex Hives    Bactrim [Sulfamethoxazole-Trimethoprim] Rash        Current Medications:   Current Outpatient Medications   Medication Sig Dispense Refill    amLODIPine (NORVASC) 5 MG tablet       buPROPion XL (WELLBUTRIN XL) 300 MG 24 hr tablet Take 1 tablet by mouth Daily.      cloNIDine (CATAPRES) 0.3 MG tablet Take 1 tablet by mouth Daily.      Diclofenac Sodium (VOLTAREN) 1 % gel gel diclofenac 1 % topical gel      EQ Aspirin Adult Low Dose 81 MG EC tablet Take 1 tablet by mouth Daily.      esomeprazole (nexIUM) 40 MG capsule Take 1 capsule by mouth Daily.      ferrous sulfate 324 (65 Fe) MG tablet delayed-release EC tablet Take 1 tablet by mouth Daily.      fexofenadine (ALLEGRA) 180 MG tablet       fluconazole (DIFLUCAN) 150 MG tablet TAKE 1 TABLET BY MOUTH ONCE DAILY FOR 3 DAYS      furosemide (LASIX) 40 MG tablet Take 1 tablet by mouth Daily.      HYDROcodone-acetaminophen (NORCO) 7.5-325 MG per tablet Take 1 tablet by mouth Every 8 (Eight) Hours As Needed. for pain      levoFLOXacin (LEVAQUIN) 500 MG tablet take 1 tablet by mouth every 24 hours      levothyroxine (SYNTHROID, LEVOTHROID) 150 MCG tablet Take 1 tablet by mouth Daily.      losartan (COZAAR) 50 MG tablet Take 1 tablet by mouth Daily.      methocarbamol (ROBAXIN) 750 MG tablet TAKE 1 TABLET BY MOUTH 4 TIMES DAILY AS NEEDED      nitroglycerin (NITROSTAT) 0.4 MG SL tablet Place 1 tablet under the tongue Every 5 (Five) Minutes As Needed for Chest Pain. do not exceed a total of 3 doses in 15 minutes      phenazopyridine (PYRIDIUM) 100 MG tablet TAKE 1 TABLET BY MOUTH THREE TIMES  "DAILY AFTER A MEAL AS NEEDED      pregabalin (LYRICA) 50 MG capsule Take 3 capsules by mouth Every 12 (Twelve) Hours.      vitamin B-12 (CYANOCOBALAMIN) 1000 MCG tablet Take 1 tablet by mouth Daily.      Brexpiprazole 0.25 MG tablet Take 0.25 mg by mouth Daily for 30 days. 30 tablet 0    cyanocobalamin 1000 MCG/ML injection  (Patient not taking: Reported on 9/14/2023)      desvenlafaxine (Pristiq) 50 MG 24 hr tablet Take 1 tablet by mouth Daily. 30 tablet 0    fluticasone (FLONASE) 50 MCG/ACT nasal spray fluticasone propionate 50 mcg/actuation nasal spray,suspension (Patient not taking: Reported on 9/14/2023)      hydroCHLOROthiazide (HYDRODIURIL) 25 MG tablet Take 25 mg by mouth Daily. (Patient not taking: Reported on 9/14/2023)      progesterone (PROMETRIUM) 100 MG capsule Take 100 mg by mouth Daily. (Patient not taking: Reported on 9/14/2023)       No current facility-administered medications for this visit.       Review of Systems:    Review of Systems      Physical Exam:   Physical Exam    Vitals:  Blood pressure 124/76, pulse 79, height 152.4 cm (60\"), weight 86.9 kg (191 lb 9.6 oz).   Body mass index is 37.42 kg/m².    Last 3 Blood Pressure Readings:  BP Readings from Last 3 Encounters:   09/14/23 124/76   12/27/21 133/68   02/05/21 128/82       PHQ-9 Score:   PHQ-9 Total Score:      JUSTUS-7 Score:         Mental Status Exam:   Hygiene:   good  Cooperation:  Cooperative  Eye Contact:  Good  Psychomotor Behavior:  Appropriate  Affect:  Full range  Mood: normal  Hopelessness: 5  Speech:  Normal  Thought Process:  Linear  Thought Content:  Normal  Suicidal:  None  Homicidal:  None  Hallucinations:  None  Delusion:  None  Memory:  Intact  Orientation:  Person, Place, Time, and Situation  Reliability:  good  Insight:  Good  Judgement:  Fair  Impulse Control:  Fair  Physical/Medical Issues:  Yes see past medical history        Lab Results:   No visits with results within 3 Month(s) from this visit.   Latest known " visit with results is:   Admission on 12/27/2021, Discharged on 12/27/2021   Component Date Value Ref Range Status    Glucose 12/27/2021 92  65 - 99 mg/dL Final    BUN 12/27/2021 12  6 - 20 mg/dL Final    Creatinine 12/27/2021 0.88  0.57 - 1.00 mg/dL Final    Sodium 12/27/2021 142  136 - 145 mmol/L Final    Potassium 12/27/2021 3.5  3.5 - 5.2 mmol/L Final    Chloride 12/27/2021 104  98 - 107 mmol/L Final    CO2 12/27/2021 26.3  22.0 - 29.0 mmol/L Final    Calcium 12/27/2021 9.0  8.6 - 10.5 mg/dL Final    Total Protein 12/27/2021 6.6  6.0 - 8.5 g/dL Final    Albumin 12/27/2021 4.01  3.50 - 5.20 g/dL Final    ALT (SGPT) 12/27/2021 13  1 - 33 U/L Final    AST (SGOT) 12/27/2021 17  1 - 32 U/L Final    Alkaline Phosphatase 12/27/2021 87  39 - 117 U/L Final    Total Bilirubin 12/27/2021 0.2  0.0 - 1.2 mg/dL Final    eGFR Non  Amer 12/27/2021 70  >60 mL/min/1.73 Final    Globulin 12/27/2021 2.6  gm/dL Final    A/G Ratio 12/27/2021 1.5  g/dL Final    BUN/Creatinine Ratio 12/27/2021 13.6  7.0 - 25.0 Final    Anion Gap 12/27/2021 11.7  5.0 - 15.0 mmol/L Final    Lipase 12/27/2021 39  13 - 60 U/L Final    C-Reactive Protein 12/27/2021 <0.30  0.00 - 0.50 mg/dL Final    Extra Tube 12/27/2021 Hold for add-ons.   Final    Auto resulted.    Extra Tube 12/27/2021 hold for add-on   Final    Auto resulted    Extra Tube 12/27/2021 Hold for add-ons.   Final    Auto resulted.    Extra Tube 12/27/2021 hold for add-on   Final    Auto resulted    WBC 12/27/2021 8.73  3.40 - 10.80 10*3/mm3 Final    RBC 12/27/2021 4.63  3.77 - 5.28 10*6/mm3 Final    Hemoglobin 12/27/2021 13.3  12.0 - 15.9 g/dL Final    Hematocrit 12/27/2021 40.4  34.0 - 46.6 % Final    MCV 12/27/2021 87.3  79.0 - 97.0 fL Final    MCH 12/27/2021 28.7  26.6 - 33.0 pg Final    MCHC 12/27/2021 32.9  31.5 - 35.7 g/dL Final    RDW 12/27/2021 14.7  12.3 - 15.4 % Final    RDW-SD 12/27/2021 46.4  37.0 - 54.0 fl Final    MPV 12/27/2021 9.8  6.0 - 12.0 fL Final    Platelets  12/27/2021 288  140 - 450 10*3/mm3 Final    Neutrophil % 12/27/2021 56.9  42.7 - 76.0 % Final    Lymphocyte % 12/27/2021 31.5  19.6 - 45.3 % Final    Monocyte % 12/27/2021 8.2  5.0 - 12.0 % Final    Eosinophil % 12/27/2021 2.7  0.3 - 6.2 % Final    Basophil % 12/27/2021 0.6  0.0 - 1.5 % Final    Immature Grans % 12/27/2021 0.1  0.0 - 0.5 % Final    Neutrophils, Absolute 12/27/2021 4.96  1.70 - 7.00 10*3/mm3 Final    Lymphocytes, Absolute 12/27/2021 2.75  0.70 - 3.10 10*3/mm3 Final    Monocytes, Absolute 12/27/2021 0.72  0.10 - 0.90 10*3/mm3 Final    Eosinophils, Absolute 12/27/2021 0.24  0.00 - 0.40 10*3/mm3 Final    Basophils, Absolute 12/27/2021 0.05  0.00 - 0.20 10*3/mm3 Final    Immature Grans, Absolute 12/27/2021 0.01  0.00 - 0.05 10*3/mm3 Final    nRBC 12/27/2021 0.0  0.0 - 0.2 /100 WBC Final         Assessment & Plan   Diagnoses and all orders for this visit:    1. Severe episode of recurrent major depressive disorder, without psychotic features (Primary)    2. Generalized anxiety disorder    3. Medication management  -     oxTox Drug Screen    Other orders  -     desvenlafaxine (Pristiq) 50 MG 24 hr tablet; Take 1 tablet by mouth Daily.  Dispense: 30 tablet; Refill: 0  -     Brexpiprazole 0.25 MG tablet; Take 0.25 mg by mouth Daily for 30 days.  Dispense: 30 tablet; Refill: 0        Visit Diagnoses:    ICD-10-CM ICD-9-CM   1. Severe episode of recurrent major depressive disorder, without psychotic features  F33.2 296.33   2. Generalized anxiety disorder  F41.1 300.02   3. Medication management  Z79.899 V58.69       GOALS:  Short Term Goals: Patient will be compliant with medication, and patient will have no significant medication related side effects.  Patient will be engaged in psychotherapy as indicated.  Patient will report subjective improvement of symptoms.  Long term goals: To stabilize mood and treat/improve subjective symptoms, the patient will stay out of the hospital, the patient will be at an  optimal level of functioning, and the patient will take all medications as prescribed.  The patient/guardian verbalized understanding and agreement with goals that were mutually set.      TREATMENT PLAN: Continue supportive psychotherapy efforts and medications as indicated.  Pharmacological and Non-Pharmacological treatment options discussed during today's visit. Patient/Guardian acknowledged and verbally consented with current treatment plan and was educated on the importance of compliance with treatment and follow-up appointments.      MEDICATION ISSUES:  Discussed medication options and treatment plan of prescribed medication as well as the risks, benefits, any black box warnings, and side effects including potential falls, possible impaired driving, and metabolic adversities among others. Patient is agreeable to call the office with any worsening of symptoms or onset of side effects, or if any concerns or questions arise.  The contact information for the office is made available to the patient. Patient is agreeable to call 911 or go to the nearest ER should they begin having any SI/HI, or if any urgent concerns arise. No medication side effects or related complaints today.     MEDS ORDERED DURING VISIT:  New Medications Ordered This Visit   Medications    desvenlafaxine (Pristiq) 50 MG 24 hr tablet     Sig: Take 1 tablet by mouth Daily.     Dispense:  30 tablet     Refill:  0    Brexpiprazole 0.25 MG tablet     Sig: Take 0.25 mg by mouth Daily for 30 days.     Dispense:  30 tablet     Refill:  0     Plan:  - Patient tells me that she has not taken her Prozac or Wellbutrin in a week or more and feels like they were not beneficial we will discontinue these.  - Start Pristiq 50 mg p.o. daily for depression and anxiety.  - Start Rexulti 0.25 mg p.o. daily as an adjunctive treatment for treatment resistant depression.  - Encourage patient to go to nearest ED if SI/HI develop.  - We will order Gene[employer] testing to be  sent to patient's home via [facility].  Follow Up Appointment:   Return in about 4 weeks (around 10/12/2023) for Recheck.             This document has been electronically signed by KRISHNA Burgess  September 14, 2023 16:22 EDT    Dictated Utilizing Dragon Dictation: Part of this note may be an electronic transcription/translation of spoken language to printed text using the Dragon Dictation System.

## 2023-09-15 ENCOUNTER — PRIOR AUTHORIZATION (OUTPATIENT)
Dept: PSYCHIATRY | Facility: CLINIC | Age: 43
End: 2023-09-15
Payer: COMMERCIAL

## 2023-09-15 NOTE — TELEPHONE ENCOUNTER
Yuni Garcia (Key: [id number])  Rx #: [id number]  Rexulti 0.25MG tablets     Form  [employer] [place] Medicaid ePA Form 2017 [place]

## 2023-10-12 ENCOUNTER — OFFICE VISIT (OUTPATIENT)
Dept: PSYCHIATRY | Facility: CLINIC | Age: 43
End: 2023-10-12
Payer: COMMERCIAL

## 2023-10-12 VITALS
HEIGHT: 60 IN | BODY MASS INDEX: 38.87 KG/M2 | HEART RATE: 65 BPM | WEIGHT: 198 LBS | SYSTOLIC BLOOD PRESSURE: 118 MMHG | DIASTOLIC BLOOD PRESSURE: 78 MMHG | OXYGEN SATURATION: 100 %

## 2023-10-12 DIAGNOSIS — F33.2 SEVERE EPISODE OF RECURRENT MAJOR DEPRESSIVE DISORDER, WITHOUT PSYCHOTIC FEATURES: Primary | ICD-10-CM

## 2023-10-12 DIAGNOSIS — F41.1 GENERALIZED ANXIETY DISORDER: ICD-10-CM

## 2023-10-12 DIAGNOSIS — G47.19 EXCESSIVE DAYTIME SLEEPINESS: ICD-10-CM

## 2023-10-12 RX ORDER — DESVENLAFAXINE 100 MG/1
100 TABLET, EXTENDED RELEASE ORAL DAILY
Qty: 30 TABLET | Refills: 0 | Status: SHIPPED | OUTPATIENT
Start: 2023-10-12

## 2023-10-12 RX ORDER — ONDANSETRON HYDROCHLORIDE 8 MG/1
1 TABLET, FILM COATED ORAL EVERY 8 HOURS PRN
COMMUNITY

## 2023-10-12 RX ORDER — METHYLPHENIDATE HYDROCHLORIDE 20 MG/1
20 CAPSULE, EXTENDED RELEASE ORAL DAILY
Qty: 30 CAPSULE | Refills: 0 | Status: SHIPPED | OUTPATIENT
Start: 2023-10-12 | End: 2024-10-11

## 2023-10-12 RX ORDER — POTASSIUM CHLORIDE 750 MG/1
TABLET, FILM COATED, EXTENDED RELEASE ORAL
COMMUNITY
Start: 2023-09-26

## 2023-10-12 NOTE — PROGRESS NOTES
Subjective     Yuni Garcia is a 43 y.o. female who presents today for initial evaluation     Chief Complaint: Depression       History of Present Illness:    History of Present Illness        Yuni is a 43-year-old female who presents today for a follow-up visit.  Today, she brought a prescription bottle of methylphenidate 20 mg tablets prescribed to her from her primary care provider, Dr. Ace Trinidad.  This prescription was from 2020 and she still had some in the bottle.  She tells me that she had been taking them sparingly on the days that she needs to get things done because she was scared to run out.  She tells me that this medication was prescribed to her after a sleep study that revealed she needed a stimulant medication during the day for excessive daytime sleepiness related to sleep apnea.  Patient did bring this document from her medical record and for me to review today and will be scanned into her chart.  Patient tells me that she does very well when taking this medication and denies having any side effects.  She tells me that overall, she is feeling better compared to her initial visit last month.  She tells me that she has noticed a decrease in her anxiety and depression.  She tells me that there are some days where she does not get out of bed to even brush her hair or take a shower.  She tells me that she is ashamed of this but tells me that she has no energy and could spend 20+ hours per day laying down and has sometimes even gone 3 days in bed just getting up to use the restroom.  She tells me that she was even falling asleep in her vehicle while her  was driving on the way here and she is unable to drive anymore due to her excessive sleepiness.  She reports having poor motivation, fatigue, little energy, loss of interest, excessive worry, difficulty with focus and concentration, and memory issues.  She attributes many of her symptoms to her stroke in 2015 and her dog's death 3 years ago.   She denies any current SI/HI/AVH.       The following portions of the patient's history were reviewed and updated as appropriate: allergies, current medications, past family history, past medical history, past social history, past surgical history and problem list.    Past Psychiatric History:  Began Treatment: Several years ago.  She has been primarily treated from her primary care provider but has seen two PMHNP's for 1 visit each.  Diagnoses:Depression, Anxiety, and OCD, insomnia  Psychiatrist: Has seen Kandis Sneed and Amadeo here in the past.  Therapist:Denies  Admission History:Denies  Medication Trials: zoloft, lexapro, celexa, effexor, cymbalta, prozac, wellbutrin, vraylar, caplyta, abilify,   Self Harm: Denies  Suicide Attempts:Denies   Psychosis, Anxiety, Depression: Denies    Past Medical History:  Past Medical History:   Diagnosis Date    HTN (hypertension)        Substance Abuse History:   Types:Denies all, including illicit  Withdrawal Symptoms:Denies  Longest Period Sober:Not Applicable   AA: Not applicable     Social History:  Social History     Socioeconomic History    Marital status:    Tobacco Use    Smoking status: Never    Smokeless tobacco: Never   Vaping Use    Vaping Use: Never used   Substance and Sexual Activity    Alcohol use: Never    Drug use: Never    Sexual activity: Defer       Family History:  Family History   Problem Relation Age of Onset    Diabetes Mother     Heart disease Father        Past Surgical History:  Past Surgical History:   Procedure Laterality Date    BARIATRIC SURGERY      BREAST SURGERY      lift    GALLBLADDER SURGERY      STOMACH SURGERY      tummy tuck    TUBAL ABDOMINAL LIGATION         Problem List:  Patient Active Problem List   Diagnosis    Abdominal wall bulge       Allergy:   Allergies   Allergen Reactions    Latex Hives    Bactrim [Sulfamethoxazole-Trimethoprim] Rash        Current Medications:   Current Outpatient Medications    Medication Sig Dispense Refill    amLODIPine (NORVASC) 5 MG tablet       cloNIDine (CATAPRES) 0.3 MG tablet Take 1 tablet by mouth Daily.      cyanocobalamin 1000 MCG/ML injection       desvenlafaxine (Pristiq) 100 MG 24 hr tablet Take 1 tablet by mouth Daily. 30 tablet 0    Diclofenac Sodium (VOLTAREN) 1 % gel gel diclofenac 1 % topical gel      EQ Aspirin Adult Low Dose 81 MG EC tablet Take 1 tablet by mouth Daily.      esomeprazole (nexIUM) 40 MG capsule Take 1 capsule by mouth Daily.      ferrous sulfate 324 (65 Fe) MG tablet delayed-release EC tablet Take 1 tablet by mouth Daily.      fexofenadine (ALLEGRA) 180 MG tablet       fluconazole (DIFLUCAN) 150 MG tablet TAKE 1 TABLET BY MOUTH ONCE DAILY FOR 3 DAYS      fluticasone (FLONASE) 50 MCG/ACT nasal spray       furosemide (LASIX) 40 MG tablet Take 1 tablet by mouth Daily.      HYDROcodone-acetaminophen (NORCO) 7.5-325 MG per tablet Take 1 tablet by mouth Every 8 (Eight) Hours As Needed. for pain      levoFLOXacin (LEVAQUIN) 500 MG tablet take 1 tablet by mouth every 24 hours      levothyroxine (SYNTHROID, LEVOTHROID) 150 MCG tablet Take 1 tablet by mouth Daily.      losartan (COZAAR) 50 MG tablet Take 1 tablet by mouth Daily.      methocarbamol (ROBAXIN) 750 MG tablet TAKE 1 TABLET BY MOUTH 4 TIMES DAILY AS NEEDED      nitroglycerin (NITROSTAT) 0.4 MG SL tablet Place 1 tablet under the tongue Every 5 (Five) Minutes As Needed for Chest Pain. do not exceed a total of 3 doses in 15 minutes      phenazopyridine (PYRIDIUM) 100 MG tablet TAKE 1 TABLET BY MOUTH THREE TIMES DAILY AFTER A MEAL AS NEEDED      potassium chloride 10 MEQ CR tablet TAKE 3 TABLETS BY MOUTH TWICE DAILY WITH FOOD      pregabalin (LYRICA) 50 MG capsule Take 3 capsules by mouth Every 12 (Twelve) Hours.      Brexpiprazole 0.5 MG tablet Take 0.5 mg by mouth Daily. 30 tablet 0    hydroCHLOROthiazide (HYDRODIURIL) 25 MG tablet Take 25 mg by mouth Daily. (Patient not taking: Reported on 9/14/2023)   "    methylphenidate LA (Ritalin LA) 20 MG 24 hr capsule Take 1 capsule by mouth Daily 30 capsule 0    ondansetron (ZOFRAN) 8 MG tablet Take 1 mg by mouth Every 8 (Eight) Hours As Needed for Nausea or Vomiting.      progesterone (PROMETRIUM) 100 MG capsule Take 100 mg by mouth Daily. (Patient not taking: Reported on 9/14/2023)      vitamin B-12 (CYANOCOBALAMIN) 1000 MCG tablet Take 1 tablet by mouth Daily.       No current facility-administered medications for this visit.       Review of Systems:    Review of Systems   Constitutional:  Positive for appetite change, fatigue and unexpected weight gain.   Respiratory: Negative.     Cardiovascular: Negative.    Neurological:  Positive for headache.   Psychiatric/Behavioral:  Positive for decreased concentration, dysphoric mood, sleep disturbance, depressed mood and stress. Negative for agitation, behavioral problems, hallucinations, self-injury, suicidal ideas and negative for hyperactivity. The patient is nervous/anxious.          Physical Exam:   Physical Exam  Vitals reviewed.   Constitutional:       Appearance: Normal appearance.   Pulmonary:      Effort: Pulmonary effort is normal.   Musculoskeletal:         General: Normal range of motion.      Cervical back: Normal range of motion.   Neurological:      Mental Status: She is alert and oriented to person, place, and time. Mental status is at baseline.   Psychiatric:         Attention and Perception: Perception normal. She is inattentive.         Mood and Affect: Mood is depressed. Affect is tearful. Affect is not flat.         Speech: Speech normal.         Behavior: Behavior normal. Behavior is cooperative.         Thought Content: Thought content normal.         Cognition and Memory: Cognition normal. Memory is impaired.         Judgment: Judgment normal.         Vitals:  Blood pressure 118/78, pulse 65, height 152.4 cm (60\"), weight 89.8 kg (198 lb), SpO2 100%.   Body mass index is 38.67 kg/mý.    Last 3 Blood " Pressure Readings:  BP Readings from Last 3 Encounters:   10/12/23 118/78   09/14/23 124/76   12/27/21 133/68       PHQ-9 Score:   PHQ-9 Total Score:      JUSTUS-7 Score:         Mental Status Exam:   Hygiene:   good  Cooperation:  Cooperative  Eye Contact:  Good  Psychomotor Behavior:  Appropriate  Affect:  Full range  Mood: normal  Hopelessness: 5  Speech:  Normal  Thought Process:  Linear  Thought Content:  Normal  Suicidal:  None  Homicidal:  None  Hallucinations:  None  Delusion:  None  Memory:  Intact  Orientation:  Person, Place, Time, and Situation  Reliability:  good  Insight:  Good  Judgement:  Fair  Impulse Control:  Fair  Physical/Medical Issues:  Yes see past medical history        Lab Results:   Office Visit on 09/14/2023   Component Date Value Ref Range Status    External Amphetamine Screen Urine 09/14/2023 Negative   Final    External Benzodiazepine Screen Uri* 09/14/2023 Negative   Final    External Cocaine Screen Urine 09/14/2023 Negative   Final    External THC Screen Urine 09/14/2023 Negative   Final    External Methadone Screen Urine 09/14/2023 Negative   Final    External Methamphetamine Screen Ur* 09/14/2023 Negative   Final    External Oxycodone Screen Urine 09/14/2023 Negative   Final    External Buprenorphine Screen Urine 09/14/2023 Negative   Final    External MDMA 09/14/2023 Negative   Final    External Opiates Screen Urine 09/14/2023 Positive (A)   Final         Assessment & Plan   Diagnoses and all orders for this visit:    1. Severe episode of recurrent major depressive disorder, without psychotic features (Primary)    2. Generalized anxiety disorder    3. Excessive daytime sleepiness  -     methylphenidate LA (Ritalin LA) 20 MG 24 hr capsule; Take 1 capsule by mouth Daily  Dispense: 30 capsule; Refill: 0    Other orders  -     Brexpiprazole 0.5 MG tablet; Take 0.5 mg by mouth Daily.  Dispense: 30 tablet; Refill: 0  -     desvenlafaxine (Pristiq) 100 MG 24 hr tablet; Take 1 tablet by  mouth Daily.  Dispense: 30 tablet; Refill: 0          Visit Diagnoses:    ICD-10-CM ICD-9-CM   1. Severe episode of recurrent major depressive disorder, without psychotic features  F33.2 296.33   2. Generalized anxiety disorder  F41.1 300.02   3. Excessive daytime sleepiness  G47.19 780.54         GOALS:  Short Term Goals: Patient will be compliant with medication, and patient will have no significant medication related side effects.  Patient will be engaged in psychotherapy as indicated.  Patient will report subjective improvement of symptoms.  Long term goals: To stabilize mood and treat/improve subjective symptoms, the patient will stay out of the hospital, the patient will be at an optimal level of functioning, and the patient will take all medications as prescribed.  The patient/guardian verbalized understanding and agreement with goals that were mutually set.      TREATMENT PLAN: Continue supportive psychotherapy efforts and medications as indicated.  Pharmacological and Non-Pharmacological treatment options discussed during today's visit. Patient/Guardian acknowledged and verbally consented with current treatment plan and was educated on the importance of compliance with treatment and follow-up appointments.      MEDICATION ISSUES:  Discussed medication options and treatment plan of prescribed medication as well as the risks, benefits, any black box warnings, and side effects including potential falls, possible impaired driving, and metabolic adversities among others. Patient is agreeable to call the office with any worsening of symptoms or onset of side effects, or if any concerns or questions arise.  The contact information for the office is made available to the patient. Patient is agreeable to call 911 or go to the nearest ER should they begin having any SI/HI, or if any urgent concerns arise. No medication side effects or related complaints today.     MEDS ORDERED DURING VISIT:  New Medications Ordered  This Visit   Medications    Brexpiprazole 0.5 MG tablet     Sig: Take 0.5 mg by mouth Daily.     Dispense:  30 tablet     Refill:  0    desvenlafaxine (Pristiq) 100 MG 24 hr tablet     Sig: Take 1 tablet by mouth Daily.     Dispense:  30 tablet     Refill:  0    methylphenidate LA (Ritalin LA) 20 MG 24 hr capsule     Sig: Take 1 capsule by mouth Daily     Dispense:  30 capsule     Refill:  0     Plan:    - Increase Pristiq to 100 mg p.o. daily for depression and anxiety.  - Increase Rexulti 0.5 mg p.o. daily as an adjunctive treatment for treatment resistant depression.  - Start Ritalin LA for patient's excessive daytime sleepiness related to her sleep apnea.  - Jimmy reviewed at this time and patient has not been prescribed any stimulant medications in the last year.  - Encourage patient to go to nearest ED if SI/HI develop.  - We will order [facility] testing to be sent to patient's home via [employer].  Follow Up Appointment:   Return in about 4 weeks (around 11/9/2023) for Recheck.             This document has been electronically signed by KRISHNA Burgess  October 12, 2023 15:08 EDT    Dictated Utilizing Dragon Dictation: Part of this note may be an electronic transcription/translation of spoken language to printed text using the Dragon Dictation System.

## 2023-10-13 ENCOUNTER — TELEPHONE (OUTPATIENT)
Dept: PSYCHIATRY | Facility: CLINIC | Age: 43
End: 2023-10-13
Payer: COMMERCIAL

## 2023-10-13 NOTE — TELEPHONE ENCOUNTER
PA submitted for Methylphenidate HCl ER (LA) 20MG er capsules    [id number] - PA Case ID: [id number] - Rx #: [id number]    Approved 10/13/23  The request has been approved. The authorization is effective from 10/13/2023 to 10/12/2024, as long as the member is enrolled in their current health plan. The request was approved as submitted. A written notification letter will follow with additional details.

## 2023-11-07 ENCOUNTER — OFFICE VISIT (OUTPATIENT)
Dept: PSYCHIATRY | Facility: CLINIC | Age: 43
End: 2023-11-07
Payer: COMMERCIAL

## 2023-11-07 VITALS
HEART RATE: 80 BPM | BODY MASS INDEX: 38.64 KG/M2 | SYSTOLIC BLOOD PRESSURE: 138 MMHG | OXYGEN SATURATION: 97 % | HEIGHT: 60 IN | WEIGHT: 196.8 LBS | DIASTOLIC BLOOD PRESSURE: 80 MMHG

## 2023-11-07 DIAGNOSIS — F41.1 GENERALIZED ANXIETY DISORDER: ICD-10-CM

## 2023-11-07 DIAGNOSIS — G47.19 EXCESSIVE DAYTIME SLEEPINESS: ICD-10-CM

## 2023-11-07 DIAGNOSIS — F33.2 SEVERE EPISODE OF RECURRENT MAJOR DEPRESSIVE DISORDER, WITHOUT PSYCHOTIC FEATURES: ICD-10-CM

## 2023-11-07 DIAGNOSIS — F90.9 ADULT ADHD: Primary | ICD-10-CM

## 2023-11-07 RX ORDER — DEXTROAMPHETAMINE SACCHARATE, AMPHETAMINE ASPARTATE, DEXTROAMPHETAMINE SULFATE AND AMPHETAMINE SULFATE 2.5; 2.5; 2.5; 2.5 MG/1; MG/1; MG/1; MG/1
10 TABLET ORAL 2 TIMES DAILY
Qty: 60 TABLET | Refills: 0 | Status: SHIPPED | OUTPATIENT
Start: 2023-11-07 | End: 2023-12-07

## 2023-11-07 RX ORDER — DESVENLAFAXINE 100 MG/1
100 TABLET, EXTENDED RELEASE ORAL DAILY
Qty: 30 TABLET | Refills: 0 | Status: SHIPPED | OUTPATIENT
Start: 2023-11-07

## 2023-11-07 RX ORDER — BUPROPION HYDROCHLORIDE 150 MG/1
150 TABLET ORAL EVERY MORNING
Qty: 30 TABLET | Refills: 0 | Status: SHIPPED | OUTPATIENT
Start: 2023-11-07 | End: 2024-11-06

## 2023-11-07 NOTE — PROGRESS NOTES
Subjective     Yuni Garcia is a 43 y.o. female who presents today for follow up    Chief Complaint: Depression       History of Present Illness:    History of Present Illness      Yuni is a 43-year-old female who presents today for a follow-up visit.  She tells me that with the addition of the methylphenidate into her treatment, she has noticed positive changes in her overall quality of life and daily functioning.  She tells me that she has noticed that it does not last but a few hours per dose.  She tells me that after each dose, she is able to get out of bed, clear to her hope, has better concentration and focus, is not as easily distracted, and has more motivation.  She denies any SI/HI/AVH.  She tells me that she has always been an over thinker but does not report any increased anxiety since starting a stimulant.  She does report having poor motivation, fatigue Gorge little energy, loss of interest, and difficulty with focus, concentration, and memory.  She does report ADHD symptoms to include difficulty sustaining attention, failure to give attention to detail, quickly losing focus, difficulty with organizing tasks and activities, frequently losing things, being forgetful in daily activities, and being distracted easily.  Depressive symptoms at today's visit include depressed mood, sleep disturbance (hypersomnia), feelings of guilt, decreased motivation, anhedonia, decreased interest.  She tells me that she feels terrible about herself because she has spent the last 7 or 8 years in bed sleeping most of the day.       The following portions of the patient's history were reviewed and updated as appropriate: allergies, current medications, past family history, past medical history, past social history, past surgical history and problem list.    Past Psychiatric History:  Began Treatment: Several years ago.  She has been primarily treated from her primary care provider but has seen two PMHNP's for 1 visit  each.  Diagnoses:Depression, Anxiety, and OCD, insomnia  Psychiatrist: Has seen Kandis Sneed and Amadeo here in the past.  Therapist:Denies  Admission History:Denies  Medication Trials: zoloft, lexapro, celexa, effexor, cymbalta, prozac, wellbutrin, vraylar, caplyta, abilify,   Self Harm: Denies  Suicide Attempts:Denies   Psychosis, Anxiety, Depression: Denies    Past Medical History:  Past Medical History:   Diagnosis Date    HTN (hypertension)        Substance Abuse History:   Types:Denies all, including illicit  Withdrawal Symptoms:Denies  Longest Period Sober:Not Applicable   AA: Not applicable     Social History:  Social History     Socioeconomic History    Marital status:    Tobacco Use    Smoking status: Never    Smokeless tobacco: Never   Vaping Use    Vaping Use: Never used   Substance and Sexual Activity    Alcohol use: Never    Drug use: Never    Sexual activity: Defer       Family History:  Family History   Problem Relation Age of Onset    Diabetes Mother     Heart disease Father        Past Surgical History:  Past Surgical History:   Procedure Laterality Date    BARIATRIC SURGERY      BREAST SURGERY      lift    GALLBLADDER SURGERY      STOMACH SURGERY      tummy tuck    TUBAL ABDOMINAL LIGATION         Problem List:  Patient Active Problem List   Diagnosis    Abdominal wall bulge       Allergy:   Allergies   Allergen Reactions    Latex Hives    Bactrim [Sulfamethoxazole-Trimethoprim] Rash        Current Medications:   Current Outpatient Medications   Medication Sig Dispense Refill    amLODIPine (NORVASC) 5 MG tablet       cloNIDine (CATAPRES) 0.3 MG tablet Take 1 tablet by mouth Daily.      desvenlafaxine (Pristiq) 100 MG 24 hr tablet Take 1 tablet by mouth Daily. 30 tablet 0    Diclofenac Sodium (VOLTAREN) 1 % gel gel diclofenac 1 % topical gel      EQ Aspirin Adult Low Dose 81 MG EC tablet Take 1 tablet by mouth Daily.      esomeprazole (nexIUM) 40 MG capsule Take 1 capsule by  mouth Daily.      ferrous sulfate 324 (65 Fe) MG tablet delayed-release EC tablet Take 1 tablet by mouth Daily.      fexofenadine (ALLEGRA) 180 MG tablet       fluconazole (DIFLUCAN) 150 MG tablet TAKE 1 TABLET BY MOUTH ONCE DAILY FOR 3 DAYS      fluticasone (FLONASE) 50 MCG/ACT nasal spray       furosemide (LASIX) 40 MG tablet Take 1 tablet by mouth Daily.      HYDROcodone-acetaminophen (NORCO) 7.5-325 MG per tablet Take 1 tablet by mouth Every 8 (Eight) Hours As Needed. for pain      levoFLOXacin (LEVAQUIN) 500 MG tablet take 1 tablet by mouth every 24 hours      levothyroxine (SYNTHROID, LEVOTHROID) 150 MCG tablet Take 1 tablet by mouth Daily.      losartan (COZAAR) 50 MG tablet Take 1 tablet by mouth Daily.      methocarbamol (ROBAXIN) 750 MG tablet TAKE 1 TABLET BY MOUTH 4 TIMES DAILY AS NEEDED      nitroglycerin (NITROSTAT) 0.4 MG SL tablet Place 1 tablet under the tongue Every 5 (Five) Minutes As Needed for Chest Pain. do not exceed a total of 3 doses in 15 minutes      ondansetron (ZOFRAN) 8 MG tablet Take 1 mg by mouth Every 8 (Eight) Hours As Needed for Nausea or Vomiting.      phenazopyridine (PYRIDIUM) 100 MG tablet TAKE 1 TABLET BY MOUTH THREE TIMES DAILY AFTER A MEAL AS NEEDED      potassium chloride 10 MEQ CR tablet TAKE 3 TABLETS BY MOUTH TWICE DAILY WITH FOOD      pregabalin (LYRICA) 50 MG capsule Take 3 capsules by mouth Every 12 (Twelve) Hours.      vitamin B-12 (CYANOCOBALAMIN) 1000 MCG tablet Take 1 tablet by mouth 1 (One) Time Per Week.      cyanocobalamin 1000 MCG/ML injection  (Patient not taking: Reported on 11/7/2023)      hydroCHLOROthiazide (HYDRODIURIL) 25 MG tablet Take 25 mg by mouth Daily. (Patient not taking: Reported on 9/14/2023)      progesterone (PROMETRIUM) 100 MG capsule Take 100 mg by mouth Daily. (Patient not taking: Reported on 9/14/2023)       No current facility-administered medications for this visit.       Review of Systems:    Review of Systems   Constitutional:   "Positive for appetite change, fatigue and unexpected weight gain.   Respiratory: Negative.     Cardiovascular: Negative.    Neurological:  Negative for headache.   Psychiatric/Behavioral:  Positive for decreased concentration, dysphoric mood, sleep disturbance, depressed mood and stress. Negative for agitation, behavioral problems, hallucinations, self-injury, suicidal ideas and negative for hyperactivity. The patient is nervous/anxious.          Physical Exam:   Physical Exam  Vitals reviewed.   Constitutional:       Appearance: Normal appearance.   Pulmonary:      Effort: Pulmonary effort is normal.   Musculoskeletal:         General: Normal range of motion.      Cervical back: Normal range of motion.   Neurological:      Mental Status: She is alert and oriented to person, place, and time. Mental status is at baseline.   Psychiatric:         Attention and Perception: Perception normal. She is inattentive.         Mood and Affect: Mood is depressed. Affect is tearful. Affect is not flat.         Speech: Speech normal.         Behavior: Behavior normal. Behavior is cooperative.         Thought Content: Thought content normal.         Cognition and Memory: Cognition normal. Memory is impaired.         Judgment: Judgment normal.         Vitals:  Blood pressure 138/80, pulse 80, height 152.4 cm (60\"), weight 89.3 kg (196 lb 12.8 oz), SpO2 97%.   Body mass index is 38.43 kg/m².    Last 3 Blood Pressure Readings:  BP Readings from Last 3 Encounters:   11/07/23 138/80   10/12/23 118/78   09/14/23 124/76       PHQ-9 Score:   PHQ-9 Total Score:      JUSTUS-7 Score:         Mental Status Exam:   Hygiene:   good  Cooperation:  Cooperative  Eye Contact:  Good  Psychomotor Behavior:  Appropriate  Affect:  Full range  Mood: normal  Hopelessness: 5  Speech:  Normal  Thought Process:  Linear  Thought Content:  Normal  Suicidal:  None  Homicidal:  None  Hallucinations:  None  Delusion:  None  Memory:  Intact  Orientation:  Person, " Place, Time, and Situation  Reliability:  good  Insight:  Good  Judgement:  Fair  Impulse Control:  Fair  Physical/Medical Issues:  Yes see past medical history        Lab Results:   Office Visit on 09/14/2023   Component Date Value Ref Range Status    External Amphetamine Screen Urine 09/14/2023 Negative   Final    External Benzodiazepine Screen Uri* 09/14/2023 Negative   Final    External Cocaine Screen Urine 09/14/2023 Negative   Final    External THC Screen Urine 09/14/2023 Negative   Final    External Methadone Screen Urine 09/14/2023 Negative   Final    External Methamphetamine Screen Ur* 09/14/2023 Negative   Final    External Oxycodone Screen Urine 09/14/2023 Negative   Final    External Buprenorphine Screen Urine 09/14/2023 Negative   Final    External MDMA 09/14/2023 Negative   Final    External Opiates Screen Urine 09/14/2023 Positive (A)   Final         Assessment & Plan   Diagnoses and all orders for this visit:    1. Adult ADHD (Primary)    2. Severe episode of recurrent major depressive disorder, without psychotic features    3. Generalized anxiety disorder    4. Excessive daytime sleepiness            Visit Diagnoses:    ICD-10-CM ICD-9-CM   1. Adult ADHD  F90.9 314.01   2. Severe episode of recurrent major depressive disorder, without psychotic features  F33.2 296.33   3. Generalized anxiety disorder  F41.1 300.02   4. Excessive daytime sleepiness  G47.19 780.54           GOALS:  Short Term Goals: Patient will be compliant with medication, and patient will have no significant medication related side effects.  Patient will be engaged in psychotherapy as indicated.  Patient will report subjective improvement of symptoms.  Long term goals: To stabilize mood and treat/improve subjective symptoms, the patient will stay out of the hospital, the patient will be at an optimal level of functioning, and the patient will take all medications as prescribed.  The patient/guardian verbalized understanding and  agreement with goals that were mutually set.      TREATMENT PLAN: Continue supportive psychotherapy efforts and medications as indicated.  Pharmacological and Non-Pharmacological treatment options discussed during today's visit. Patient/Guardian acknowledged and verbally consented with current treatment plan and was educated on the importance of compliance with treatment and follow-up appointments.      MEDICATION ISSUES:  Discussed medication options and treatment plan of prescribed medication as well as the risks, benefits, any black box warnings, and side effects including potential falls, possible impaired driving, and metabolic adversities among others. Patient is agreeable to call the office with any worsening of symptoms or onset of side effects, or if any concerns or questions arise.  The contact information for the office is made available to the patient. Patient is agreeable to call 911 or go to the nearest ER should they begin having any SI/HI, or if any urgent concerns arise. No medication side effects or related complaints today.     MEDS ORDERED DURING VISIT:  No orders of the defined types were placed in this encounter.    Plan:    - Continue Pristiq 100 mg p.o. daily for depression and anxiety.  - Discontinue Rexulti 0.5 mg p.o. daily.  Patient tells me that she stopped taking this medication due to a 5 pound weight gain  - Discontinue Ritalin LA and start Adderall 10 mg p.o. daily for adult ADHD, excessive daytime sleepiness, and treatment resistant depression.  - Start Wellbutrin  mg p.o. daily for depression and ADHD symptoms  - Jimmy reviewed at this time and patient has not been prescribed any  - Start Wellbutrin stimulant medications in the last year.  - Encourage patient to go to nearest ED if SI/HI develop.    Follow Up Appointment:   No follow-ups on file.             This document has been electronically signed by KRISHNA Burgess  November 7, 2023 15:06 EST    Dictated  Utilizing Dragon Dictation: Part of this note may be an electronic transcription/translation of spoken language to printed text using the Dragon Dictation System.

## 2023-11-08 ENCOUNTER — DOCUMENTATION (OUTPATIENT)
Dept: PSYCHIATRY | Facility: CLINIC | Age: 43
End: 2023-11-08
Payer: COMMERCIAL

## 2023-12-07 ENCOUNTER — OFFICE VISIT (OUTPATIENT)
Dept: PSYCHIATRY | Facility: CLINIC | Age: 43
End: 2023-12-07
Payer: COMMERCIAL

## 2023-12-07 VITALS
HEIGHT: 60 IN | DIASTOLIC BLOOD PRESSURE: 80 MMHG | WEIGHT: 197 LBS | OXYGEN SATURATION: 96 % | BODY MASS INDEX: 38.68 KG/M2 | HEART RATE: 67 BPM | SYSTOLIC BLOOD PRESSURE: 138 MMHG

## 2023-12-07 DIAGNOSIS — F90.9 ADULT ADHD: ICD-10-CM

## 2023-12-07 DIAGNOSIS — G47.19 EXCESSIVE DAYTIME SLEEPINESS: ICD-10-CM

## 2023-12-07 DIAGNOSIS — F33.2 SEVERE EPISODE OF RECURRENT MAJOR DEPRESSIVE DISORDER, WITHOUT PSYCHOTIC FEATURES: Primary | ICD-10-CM

## 2023-12-07 DIAGNOSIS — F41.1 GENERALIZED ANXIETY DISORDER: ICD-10-CM

## 2023-12-07 RX ORDER — BUPROPION HYDROCHLORIDE 300 MG/1
300 TABLET ORAL EVERY MORNING
Qty: 30 TABLET | Refills: 0 | Status: SHIPPED | OUTPATIENT
Start: 2023-12-07 | End: 2024-12-06

## 2023-12-07 RX ORDER — DEXTROAMPHETAMINE SACCHARATE, AMPHETAMINE ASPARTATE, DEXTROAMPHETAMINE SULFATE AND AMPHETAMINE SULFATE 7.5; 7.5; 7.5; 7.5 MG/1; MG/1; MG/1; MG/1
30 TABLET ORAL 2 TIMES DAILY
Qty: 60 TABLET | Refills: 0 | Status: SHIPPED | OUTPATIENT
Start: 2023-12-07

## 2023-12-07 RX ORDER — HYDROXYZINE HYDROCHLORIDE 25 MG/1
25 TABLET, FILM COATED ORAL 2 TIMES DAILY PRN
Qty: 60 TABLET | Refills: 0 | Status: SHIPPED | OUTPATIENT
Start: 2023-12-07

## 2023-12-07 RX ORDER — DESVENLAFAXINE 100 MG/1
100 TABLET, EXTENDED RELEASE ORAL DAILY
Qty: 30 TABLET | Refills: 0 | Status: SHIPPED | OUTPATIENT
Start: 2023-12-07

## 2023-12-07 NOTE — PROGRESS NOTES
Subjective     Yuni Garcia is a 43 y.o. female who presents today for follow up    Chief Complaint: Depression       History of Present Illness:    History of Present Illness      Yuni is a 43-year-old female who presents today for a follow-up visit. She has noticed positive changes in her overall quality of life and daily functioning since the addition of a stimulant.  She tells me that she regrets not getting the help she needed sooner because of all of the years that she spent sleeping and lying in bed.  Tells me that the only issue she has is that she feels like the medication wears off quickly.  She tells me that after each dose, she is able to get out of bed, clean her home, has better concentration and focus, is not as easily distracted, and has more motivation and energy.  She denies any SI/HI/AVH.  She tells me that she has always been an over thinker but does not report any increased anxiety since starting a stimulant.  She does report having poor motivation, fatigue, little energy, loss of interest, and difficulty with focus, concentration, and memory.  She does report ADHD symptoms to include difficulty sustaining attention, failure to give attention to detail, quickly losing focus, difficulty with organizing tasks and activities, frequently losing things, being forgetful in daily activities, and being distracted easily.  Depressive symptoms at today's visit include depressed mood, sleep disturbance (hypersomnia), feelings of guilt, decreased motivation, anhedonia, decreased interest.  She tells me that she feels terrible about herself because she has spent the last 7 or 8 years in bed sleeping most of the day.  She denies having any cardiac side effects to the medication.  She tells me that she has noticed an increase in her skin picking and feels like she has been getting a little more depressed than she had been for the last couple of months.  She tells me that her sleep at night is fair but she  still finds herself wanting to sleep throughout the day as well.  No changes in her appetite.  She tells me that she would like to avoid medications that cause weight gain at this time.         The following portions of the patient's history were reviewed and updated as appropriate: allergies, current medications, past family history, past medical history, past social history, past surgical history and problem list.    Past Psychiatric History:  Began Treatment: Several years ago.  She has been primarily treated from her primary care provider but has seen two PMHNP's for 1 visit each.  Diagnoses:Depression, Anxiety, and OCD, insomnia  Psychiatrist: Has seen Kandis Sneed and Amadeo here in the past.  Therapist:Denies  Admission History:Denies  Medication Trials: zoloft, lexapro, celexa, effexor, cymbalta, prozac, wellbutrin, vraylar, caplyta, abilify, trazodone  Self Harm: Denies  Suicide Attempts:Denies   Psychosis, Anxiety, Depression: Denies    Past Medical History:  Past Medical History:   Diagnosis Date    HTN (hypertension)        Substance Abuse History:   Types:Denies all, including illicit  Withdrawal Symptoms:Denies  Longest Period Sober:Not Applicable   AA: Not applicable     Social History:  Social History     Socioeconomic History    Marital status:    Tobacco Use    Smoking status: Never    Smokeless tobacco: Never   Vaping Use    Vaping Use: Never used   Substance and Sexual Activity    Alcohol use: Never    Drug use: Never    Sexual activity: Defer       Family History:  Family History   Problem Relation Age of Onset    Diabetes Mother     Heart disease Father        Past Surgical History:  Past Surgical History:   Procedure Laterality Date    BARIATRIC SURGERY      BREAST SURGERY      lift    GALLBLADDER SURGERY      STOMACH SURGERY      tummy tuck    TUBAL ABDOMINAL LIGATION         Problem List:  Patient Active Problem List   Diagnosis    Abdominal wall bulge       Allergy:    Allergies   Allergen Reactions    Latex Hives    Bactrim [Sulfamethoxazole-Trimethoprim] Rash        Current Medications:   Current Outpatient Medications   Medication Sig Dispense Refill    buPROPion XL (Wellbutrin XL) 300 MG 24 hr tablet Take 1 tablet by mouth Every Morning. 30 tablet 0    desvenlafaxine (Pristiq) 100 MG 24 hr tablet Take 1 tablet by mouth Daily. 30 tablet 0    amLODIPine (NORVASC) 5 MG tablet       amphetamine-dextroamphetamine (Adderall) 30 MG tablet Take 1 tablet by mouth 2 (Two) Times a Day. 60 tablet 0    cloNIDine (CATAPRES) 0.3 MG tablet Take 1 tablet by mouth Daily.      cyanocobalamin 1000 MCG/ML injection  (Patient not taking: Reported on 11/7/2023)      Diclofenac Sodium (VOLTAREN) 1 % gel gel diclofenac 1 % topical gel      EQ Aspirin Adult Low Dose 81 MG EC tablet Take 1 tablet by mouth Daily.      esomeprazole (nexIUM) 40 MG capsule Take 1 capsule by mouth Daily.      ferrous sulfate 324 (65 Fe) MG tablet delayed-release EC tablet Take 1 tablet by mouth Daily.      fluconazole (DIFLUCAN) 150 MG tablet TAKE 1 TABLET BY MOUTH ONCE DAILY FOR 3 DAYS      fluticasone (FLONASE) 50 MCG/ACT nasal spray       furosemide (LASIX) 40 MG tablet Take 1 tablet by mouth Daily.      hydroCHLOROthiazide (HYDRODIURIL) 25 MG tablet Take 25 mg by mouth Daily. (Patient not taking: Reported on 9/14/2023)      HYDROcodone-acetaminophen (NORCO) 7.5-325 MG per tablet Take 1 tablet by mouth Every 8 (Eight) Hours As Needed. for pain      hydrOXYzine (ATARAX) 25 MG tablet Take 1 tablet by mouth 2 (Two) Times a Day As Needed for Anxiety. 60 tablet 0    levoFLOXacin (LEVAQUIN) 500 MG tablet take 1 tablet by mouth every 24 hours      levothyroxine (SYNTHROID, LEVOTHROID) 150 MCG tablet Take 1 tablet by mouth Daily.      losartan (COZAAR) 50 MG tablet Take 1 tablet by mouth Daily.      methocarbamol (ROBAXIN) 750 MG tablet TAKE 1 TABLET BY MOUTH 4 TIMES DAILY AS NEEDED      nitroglycerin (NITROSTAT) 0.4 MG SL  tablet Place 1 tablet under the tongue Every 5 (Five) Minutes As Needed for Chest Pain. do not exceed a total of 3 doses in 15 minutes      ondansetron (ZOFRAN) 8 MG tablet Take 1 mg by mouth Every 8 (Eight) Hours As Needed for Nausea or Vomiting.      phenazopyridine (PYRIDIUM) 100 MG tablet TAKE 1 TABLET BY MOUTH THREE TIMES DAILY AFTER A MEAL AS NEEDED      potassium chloride 10 MEQ CR tablet TAKE 3 TABLETS BY MOUTH TWICE DAILY WITH FOOD      pregabalin (LYRICA) 50 MG capsule Take 3 capsules by mouth Every 12 (Twelve) Hours.      progesterone (PROMETRIUM) 100 MG capsule Take 100 mg by mouth Daily. (Patient not taking: Reported on 9/14/2023)      vitamin B-12 (CYANOCOBALAMIN) 1000 MCG tablet Take 1 tablet by mouth 1 (One) Time Per Week.       No current facility-administered medications for this visit.       Review of Systems:    Review of Systems   Constitutional:  Positive for fatigue.   Respiratory: Negative.     Cardiovascular: Negative.    Neurological:  Negative for headache.   Psychiatric/Behavioral:  Positive for decreased concentration, dysphoric mood, depressed mood and stress. Negative for agitation, behavioral problems, hallucinations, self-injury, sleep disturbance, suicidal ideas and negative for hyperactivity. The patient is not nervous/anxious.          Physical Exam:   Physical Exam  Vitals reviewed.   Constitutional:       Appearance: Normal appearance.   Pulmonary:      Effort: Pulmonary effort is normal.   Musculoskeletal:         General: Normal range of motion.      Cervical back: Normal range of motion.   Neurological:      Mental Status: She is alert and oriented to person, place, and time. Mental status is at baseline.   Psychiatric:         Attention and Perception: Attention and perception normal. She is attentive.         Mood and Affect: Mood is depressed. Affect is not flat or tearful.         Speech: Speech normal.         Behavior: Behavior normal. Behavior is cooperative.          "Thought Content: Thought content normal.         Cognition and Memory: Cognition normal. Memory is impaired.         Judgment: Judgment normal.         Vitals:  Blood pressure 138/80, pulse 67, height 152.4 cm (60\"), weight 89.4 kg (197 lb), SpO2 96%.   Body mass index is 38.47 kg/m².    Last 3 Blood Pressure Readings:  BP Readings from Last 3 Encounters:   12/07/23 138/80   11/07/23 138/80   10/12/23 118/78       PHQ-9 Score:   PHQ-9 Total Score:      JUSTUS-7 Score:         Mental Status Exam:   Hygiene:   good  Cooperation:  Cooperative  Eye Contact:  Good  Psychomotor Behavior:  Appropriate  Affect:  Full range  Mood: normal  Hopelessness: 5  Speech:  Normal  Thought Process:  Linear  Thought Content:  Normal  Suicidal:  None  Homicidal:  None  Hallucinations:  None  Delusion:  None  Memory:  Intact  Orientation:  Person, Place, Time, and Situation  Reliability:  good  Insight:  Good  Judgement:  Fair  Impulse Control:  Fair  Physical/Medical Issues:  Yes see past medical history        Lab Results:   Office Visit on 09/14/2023   Component Date Value Ref Range Status    External Amphetamine Screen Urine 09/14/2023 Negative   Final    External Benzodiazepine Screen Uri* 09/14/2023 Negative   Final    External Cocaine Screen Urine 09/14/2023 Negative   Final    External THC Screen Urine 09/14/2023 Negative   Final    External Methadone Screen Urine 09/14/2023 Negative   Final    External Methamphetamine Screen Ur* 09/14/2023 Negative   Final    External Oxycodone Screen Urine 09/14/2023 Negative   Final    External Buprenorphine Screen Urine 09/14/2023 Negative   Final    External MDMA 09/14/2023 Negative   Final    External Opiates Screen Urine 09/14/2023 Positive (A)   Final         Assessment & Plan   Diagnoses and all orders for this visit:    1. Severe episode of recurrent major depressive disorder, without psychotic features (Primary)    2. Adult ADHD  -     amphetamine-dextroamphetamine (Adderall) 30 MG " tablet; Take 1 tablet by mouth 2 (Two) Times a Day.  Dispense: 60 tablet; Refill: 0    3. Excessive daytime sleepiness  -     amphetamine-dextroamphetamine (Adderall) 30 MG tablet; Take 1 tablet by mouth 2 (Two) Times a Day.  Dispense: 60 tablet; Refill: 0    4. Generalized anxiety disorder    Other orders  -     hydrOXYzine (ATARAX) 25 MG tablet; Take 1 tablet by mouth 2 (Two) Times a Day As Needed for Anxiety.  Dispense: 60 tablet; Refill: 0  -     desvenlafaxine (Pristiq) 100 MG 24 hr tablet; Take 1 tablet by mouth Daily.  Dispense: 30 tablet; Refill: 0  -     buPROPion XL (Wellbutrin XL) 300 MG 24 hr tablet; Take 1 tablet by mouth Every Morning.  Dispense: 30 tablet; Refill: 0              Visit Diagnoses:    ICD-10-CM ICD-9-CM   1. Severe episode of recurrent major depressive disorder, without psychotic features  F33.2 296.33   2. Adult ADHD  F90.9 314.01   3. Excessive daytime sleepiness  G47.19 780.54   4. Generalized anxiety disorder  F41.1 300.02             GOALS:  Short Term Goals: Patient will be compliant with medication, and patient will have no significant medication related side effects.  Patient will be engaged in psychotherapy as indicated.  Patient will report subjective improvement of symptoms.  Long term goals: To stabilize mood and treat/improve subjective symptoms, the patient will stay out of the hospital, the patient will be at an optimal level of functioning, and the patient will take all medications as prescribed.  The patient/guardian verbalized understanding and agreement with goals that were mutually set.      TREATMENT PLAN: Continue supportive psychotherapy efforts and medications as indicated.  Pharmacological and Non-Pharmacological treatment options discussed during today's visit. Patient/Guardian acknowledged and verbally consented with current treatment plan and was educated on the importance of compliance with treatment and follow-up appointments.      MEDICATION  ISSUES:  Discussed medication options and treatment plan of prescribed medication as well as the risks, benefits, any black box warnings, and side effects including potential falls, possible impaired driving, and metabolic adversities among others. Patient is agreeable to call the office with any worsening of symptoms or onset of side effects, or if any concerns or questions arise.  The contact information for the office is made available to the patient. Patient is agreeable to call 911 or go to the nearest ER should they begin having any SI/HI, or if any urgent concerns arise. No medication side effects or related complaints today.     MEDS ORDERED DURING VISIT:  New Medications Ordered This Visit   Medications    hydrOXYzine (ATARAX) 25 MG tablet     Sig: Take 1 tablet by mouth 2 (Two) Times a Day As Needed for Anxiety.     Dispense:  60 tablet     Refill:  0    desvenlafaxine (Pristiq) 100 MG 24 hr tablet     Sig: Take 1 tablet by mouth Daily.     Dispense:  30 tablet     Refill:  0    buPROPion XL (Wellbutrin XL) 300 MG 24 hr tablet     Sig: Take 1 tablet by mouth Every Morning.     Dispense:  30 tablet     Refill:  0    amphetamine-dextroamphetamine (Adderall) 30 MG tablet     Sig: Take 1 tablet by mouth 2 (Two) Times a Day.     Dispense:  60 tablet     Refill:  0     Plan:    - Continue Pristiq 100 mg p.o. daily for depression and anxiety.  - Increase Adderall to 30 mg p.o. twice daily for ADHD, refractory depression, and chronic hypersomnia.  - Increase Wellbutrin XL to 300mg p.o. daily for depression and ADHD symptoms  - Jimmy reviewed at this time and is appropriate.  - Discussed with patient possible side effects to include tachycardia, hypertension, cardiac death, anxiety, irritability, and mood fluctuations.  Patient verbalizes understanding and is aware to stop the medication and seek medical attention immediately if she begins to experience any of these symptoms.  - Encourage patient to go to nearest  ED if SI/HI develop.    Follow Up Appointment:   No follow-ups on file.             This document has been electronically signed by KRISHNA Burgess  December 7, 2023 15:46 EST    Dictated Utilizing Dragon Dictation: Part of this note may be an electronic transcription/translation of spoken language to printed text using the Dragon Dictation System.

## 2024-01-05 DIAGNOSIS — G47.19 EXCESSIVE DAYTIME SLEEPINESS: ICD-10-CM

## 2024-01-05 DIAGNOSIS — F90.9 ADULT ADHD: ICD-10-CM

## 2024-01-05 RX ORDER — BUPROPION HYDROCHLORIDE 300 MG/1
300 TABLET ORAL EVERY MORNING
Qty: 30 TABLET | Refills: 0 | Status: SHIPPED | OUTPATIENT
Start: 2024-01-05 | End: 2025-01-04

## 2024-01-05 RX ORDER — HYDROXYZINE HYDROCHLORIDE 25 MG/1
25 TABLET, FILM COATED ORAL 2 TIMES DAILY PRN
Qty: 60 TABLET | Refills: 0 | Status: SHIPPED | OUTPATIENT
Start: 2024-01-05

## 2024-01-05 RX ORDER — DEXTROAMPHETAMINE SACCHARATE, AMPHETAMINE ASPARTATE, DEXTROAMPHETAMINE SULFATE AND AMPHETAMINE SULFATE 7.5; 7.5; 7.5; 7.5 MG/1; MG/1; MG/1; MG/1
30 TABLET ORAL 2 TIMES DAILY
Qty: 60 TABLET | Refills: 0 | Status: SHIPPED | OUTPATIENT
Start: 2024-01-05

## 2024-01-05 RX ORDER — DESVENLAFAXINE 100 MG/1
100 TABLET, EXTENDED RELEASE ORAL DAILY
Qty: 30 TABLET | Refills: 0 | Status: SHIPPED | OUTPATIENT
Start: 2024-01-05

## 2024-02-09 ENCOUNTER — TELEMEDICINE (OUTPATIENT)
Dept: PSYCHIATRY | Facility: CLINIC | Age: 44
End: 2024-02-09
Payer: COMMERCIAL

## 2024-02-09 DIAGNOSIS — G47.19 EXCESSIVE DAYTIME SLEEPINESS: ICD-10-CM

## 2024-02-09 DIAGNOSIS — F41.1 GENERALIZED ANXIETY DISORDER: ICD-10-CM

## 2024-02-09 DIAGNOSIS — F90.9 ADULT ADHD: ICD-10-CM

## 2024-02-09 DIAGNOSIS — F33.2 SEVERE EPISODE OF RECURRENT MAJOR DEPRESSIVE DISORDER, WITHOUT PSYCHOTIC FEATURES: Primary | ICD-10-CM

## 2024-02-09 RX ORDER — BUPROPION HYDROCHLORIDE 300 MG/1
300 TABLET ORAL EVERY MORNING
Qty: 30 TABLET | Refills: 0 | Status: SHIPPED | OUTPATIENT
Start: 2024-02-09 | End: 2025-02-08

## 2024-02-09 RX ORDER — DEXTROAMPHETAMINE SACCHARATE, AMPHETAMINE ASPARTATE, DEXTROAMPHETAMINE SULFATE AND AMPHETAMINE SULFATE 3.75; 3.75; 3.75; 3.75 MG/1; MG/1; MG/1; MG/1
15 TABLET ORAL DAILY
Qty: 30 TABLET | Refills: 0 | Status: SHIPPED | OUTPATIENT
Start: 2024-02-09 | End: 2025-02-08

## 2024-02-09 RX ORDER — DESVENLAFAXINE 100 MG/1
100 TABLET, EXTENDED RELEASE ORAL DAILY
Qty: 30 TABLET | Refills: 0 | Status: SHIPPED | OUTPATIENT
Start: 2024-02-09

## 2024-02-09 RX ORDER — LISDEXAMFETAMINE DIMESYLATE CAPSULES 60 MG/1
60 CAPSULE ORAL EVERY MORNING
Qty: 30 CAPSULE | Refills: 0 | Status: SHIPPED | OUTPATIENT
Start: 2024-02-09 | End: 2024-02-12 | Stop reason: SDUPTHER

## 2024-02-09 RX ORDER — HYDROXYZINE HYDROCHLORIDE 25 MG/1
25 TABLET, FILM COATED ORAL 2 TIMES DAILY PRN
Qty: 60 TABLET | Refills: 0 | Status: SHIPPED | OUTPATIENT
Start: 2024-02-09

## 2024-02-09 NOTE — PROGRESS NOTES
This provider is located at the [facility] (through [facility]), [address] [place] [address], [id number] using a secure [employer] Video Visit through [employer]. Patient is being seen remotely via telehealth at their home address in [place], and stated they are in a secure environment for this session. The patient's condition being diagnosed/treated is appropriate for telemedicine. The provider identified herself as well as her credentials.  The patient, and/or patients guardian, consent to be seen remotely, and when consent is given they understand that the consent allows for patient identifiable information to be sent to a third party as needed.   They may refuse to be seen remotely at any time. The electronic data is encrypted and password protected, and the patient and/or guardian has been advised of the potential risks to privacy not withstanding such measures.    You have chosen to receive care through a telehealth visit.  Do you consent to use a video/audio connection for your medical care today? Yes    Patient identifiers utilized: Name and date of birth.    Patient verbally confirmed consent for today's encounter:  February 9, 2024    Subjective   Yuni Garcia is a 44 y.o. female who presents today for follow up    Chief Complaint:  hypersomnia, depression       History of Present Illness:    History of Present Illness  Yuni is a 44-year-old female who presents today for a follow-up visit with me via telehealth.  She tells me that she has done okay with Adderall 30 mg twice daily but feels like each dose is only lasting 2 to 3 hours before she has a crash.  She tells me that when her medication is wearing off, her mood fluctuates between depression and anxiety and she struggles with inattentiveness and irritability.  She tells me that she has been sleeping well and has been taking hydroxyzine at night.  She does report feeling that her depressive symptoms have greatly improved and she is able to do more  without staying in the bed for the majority of the day.  Her anxiety has also somewhat improved when she is unable to get up and do things.  She denies any SI/HI/AVH at this time.  She denies any side effects to the medications.  Denies any cardiovascular side effects such as hypertension, tachycardia, chest pain, or palpitations.  She has been monitoring her blood pressure and heart rate at home.       The following portions of the patient's history were reviewed and updated as appropriate: allergies, current medications, past family history, past medical history, past social history, past surgical history and problem list.    Past Psychiatric History:  Began Treatment: Several years ago.  She has been primarily treated from her primary care provider but has seen two PMHNP's for 1 visit each.  Diagnoses:Depression, Anxiety, and OCD, insomnia  Psychiatrist: Has seen Kandis Sneed and Amadeo here in the past.  Therapist:Denies  Admission History:Denies  Medication Trials: zoloft, lexapro, celexa, effexor, cymbalta, prozac, wellbutrin, vraylar, caplyta, abilify, trazodone  Self Harm: Denies  Suicide Attempts:Denies   Psychosis, Anxiety, Depression: Denies    Past Medical History:  Past Medical History:   Diagnosis Date    HTN (hypertension)        Substance Abuse History:   Denies    Social History:  Social History     Socioeconomic History    Marital status:    Tobacco Use    Smoking status: Never    Smokeless tobacco: Never   Vaping Use    Vaping Use: Never used   Substance and Sexual Activity    Alcohol use: Never    Drug use: Never    Sexual activity: Defer       Family History:  Family History   Problem Relation Age of Onset    Diabetes Mother     Heart disease Father        Past Surgical History:  Past Surgical History:   Procedure Laterality Date    BARIATRIC SURGERY      BREAST SURGERY      lift    GALLBLADDER SURGERY      STOMACH SURGERY      tummy tuck    TUBAL ABDOMINAL LIGATION          Problem List:  Patient Active Problem List   Diagnosis    Abdominal wall bulge       Allergy:   Allergies   Allergen Reactions    Latex Hives    Bactrim [Sulfamethoxazole-Trimethoprim] Rash        Current Medications:   Current Outpatient Medications   Medication Sig Dispense Refill    buPROPion XL (Wellbutrin XL) 300 MG 24 hr tablet Take 1 tablet by mouth Every Morning. 30 tablet 0    desvenlafaxine (Pristiq) 100 MG 24 hr tablet Take 1 tablet by mouth Daily. 30 tablet 0    hydrOXYzine (ATARAX) 25 MG tablet Take 1 tablet by mouth 2 (Two) Times a Day As Needed for Anxiety. 60 tablet 0    amLODIPine (NORVASC) 5 MG tablet       amphetamine-dextroamphetamine (Adderall) 15 MG tablet Take 1 tablet by mouth Daily. Take at lunchtime for breakthrough ADHD/depressive symptoms. 30 tablet 0    cloNIDine (CATAPRES) 0.3 MG tablet Take 1 tablet by mouth Daily.      cyanocobalamin 1000 MCG/ML injection  (Patient not taking: Reported on 11/7/2023)      Diclofenac Sodium (VOLTAREN) 1 % gel gel diclofenac 1 % topical gel      EQ Aspirin Adult Low Dose 81 MG EC tablet Take 1 tablet by mouth Daily.      esomeprazole (nexIUM) 40 MG capsule Take 1 capsule by mouth Daily.      ferrous sulfate 324 (65 Fe) MG tablet delayed-release EC tablet Take 1 tablet by mouth Daily.      fluconazole (DIFLUCAN) 150 MG tablet TAKE 1 TABLET BY MOUTH ONCE DAILY FOR 3 DAYS      fluticasone (FLONASE) 50 MCG/ACT nasal spray       furosemide (LASIX) 40 MG tablet Take 1 tablet by mouth Daily.      hydroCHLOROthiazide (HYDRODIURIL) 25 MG tablet Take 25 mg by mouth Daily. (Patient not taking: Reported on 9/14/2023)      HYDROcodone-acetaminophen (NORCO) 7.5-325 MG per tablet Take 1 tablet by mouth Every 8 (Eight) Hours As Needed. for pain      levoFLOXacin (LEVAQUIN) 500 MG tablet take 1 tablet by mouth every 24 hours      levothyroxine (SYNTHROID, LEVOTHROID) 150 MCG tablet Take 1 tablet by mouth Daily.      lisdexamfetamine (VYVANSE) 60 MG capsule Take 1  capsule by mouth Every Morning 30 capsule 0    losartan (COZAAR) 50 MG tablet Take 1 tablet by mouth Daily.      methocarbamol (ROBAXIN) 750 MG tablet TAKE 1 TABLET BY MOUTH 4 TIMES DAILY AS NEEDED      nitroglycerin (NITROSTAT) 0.4 MG SL tablet Place 1 tablet under the tongue Every 5 (Five) Minutes As Needed for Chest Pain. do not exceed a total of 3 doses in 15 minutes      ondansetron (ZOFRAN) 8 MG tablet Take 1 mg by mouth Every 8 (Eight) Hours As Needed for Nausea or Vomiting.      phenazopyridine (PYRIDIUM) 100 MG tablet TAKE 1 TABLET BY MOUTH THREE TIMES DAILY AFTER A MEAL AS NEEDED      potassium chloride 10 MEQ CR tablet TAKE 3 TABLETS BY MOUTH TWICE DAILY WITH FOOD      pregabalin (LYRICA) 50 MG capsule Take 3 capsules by mouth Every 12 (Twelve) Hours.      progesterone (PROMETRIUM) 100 MG capsule Take 100 mg by mouth Daily. (Patient not taking: Reported on 9/14/2023)      vitamin B-12 (CYANOCOBALAMIN) 1000 MCG tablet Take 1 tablet by mouth 1 (One) Time Per Week.       No current facility-administered medications for this visit.       Review of Systems:    Review of Systems   Constitutional:  Positive for fatigue.   Respiratory: Negative.     Cardiovascular: Negative.    Neurological:  Negative for headache.   Psychiatric/Behavioral:  Positive for decreased concentration, dysphoric mood, depressed mood and stress. Negative for agitation, behavioral problems, hallucinations, self-injury, sleep disturbance, suicidal ideas and negative for hyperactivity. The patient is not nervous/anxious.          Physical Exam:   Physical Exam  Vitals reviewed.   Constitutional:       Appearance: Normal appearance.   Pulmonary:      Effort: Pulmonary effort is normal.   Musculoskeletal:         General: Normal range of motion.      Cervical back: Normal range of motion.   Neurological:      Mental Status: She is alert and oriented to person, place, and time. Mental status is at baseline.   Psychiatric:         Attention and  Perception: Attention and perception normal. She is attentive.         Mood and Affect: Mood is depressed. Affect is not flat or tearful.         Speech: Speech normal.         Behavior: Behavior normal. Behavior is cooperative.         Thought Content: Thought content normal.         Cognition and Memory: Cognition normal. Memory is impaired.         Judgment: Judgment normal.         Vitals:  There were no vitals taken for this visit. There is no height or weight on file to calculate BMI.  Due to extenuating circumstances and possible current health risks associated with the patient being present in a clinical setting (with current health restrictions in place in regards to possible COVID 19 transmission/exposure), the patient was seen remotely today via a [employer] Video Visit through [employer].  Unable to obtain vital signs due to nature of remote visit.      Last 3 Blood Pressure Readings:  BP Readings from Last 3 Encounters:   12/07/23 138/80   11/07/23 138/80   10/12/23 118/78       PHQ-9 Score:   PHQ-9 Total Score:      JUSTUS-7 Score:   Feeling nervous, anxious or on edge: (P) Several days  Not being able to stop or control worrying: (P) Not at all  Worrying too much about different things: (P) Several days  Trouble Relaxing: (P) Nearly every day  Being so restless that it is hard to sit still: (P) Not at all  Feeling afraid as if something awful might happen: (P) Not at all  Becoming easily annoyed or irritable: (P) Several days  JUSTUS 7 Total Score: (P) 6  If you checked any problems, how difficult have these problems made it for you to do your work, take care of things at home, or get along with other people: (P) Somewhat difficult     Mental Status Exam:   Hygiene:   good  Cooperation:  Cooperative  Eye Contact:  Good  Psychomotor Behavior:  Appropriate  Affect:  Full range  Mood: normal  Hopelessness: 5  Speech:  Normal  Thought Process:  Linear  Thought Content:  Normal  Suicidal:  None  Homicidal:   None  Hallucinations:  None  Delusion:  None  Memory:  Intact  Orientation:  Person, Place, Time, and Situation  Reliability:  good  Insight:  Good  Judgement:  Fair  Impulse Control:  Fair  Physical/Medical Issues:  Yes see past medical history      Lab Results:   No visits with results within 3 Month(s) from this visit.   Latest known visit with results is:   Office Visit on 09/14/2023   Component Date Value Ref Range Status    External Amphetamine Screen Urine 09/14/2023 Negative   Final    External Benzodiazepine Screen Uri* 09/14/2023 Negative   Final    External Cocaine Screen Urine 09/14/2023 Negative   Final    External THC Screen Urine 09/14/2023 Negative   Final    External Methadone Screen Urine 09/14/2023 Negative   Final    External Methamphetamine Screen Ur* 09/14/2023 Negative   Final    External Oxycodone Screen Urine 09/14/2023 Negative   Final    External Buprenorphine Screen Urine 09/14/2023 Negative   Final    External MDMA 09/14/2023 Negative   Final    External Opiates Screen Urine 09/14/2023 Positive (A)   Final         Assessment & Plan   Diagnoses and all orders for this visit:    1. Severe episode of recurrent major depressive disorder, without psychotic features (Primary)    2. Adult ADHD  -     lisdexamfetamine (VYVANSE) 60 MG capsule; Take 1 capsule by mouth Every Morning  Dispense: 30 capsule; Refill: 0  -     amphetamine-dextroamphetamine (Adderall) 15 MG tablet; Take 1 tablet by mouth Daily. Take at lunchtime for breakthrough ADHD/depressive symptoms.  Dispense: 30 tablet; Refill: 0    3. Excessive daytime sleepiness  -     lisdexamfetamine (VYVANSE) 60 MG capsule; Take 1 capsule by mouth Every Morning  Dispense: 30 capsule; Refill: 0  -     amphetamine-dextroamphetamine (Adderall) 15 MG tablet; Take 1 tablet by mouth Daily. Take at lunchtime for breakthrough ADHD/depressive symptoms.  Dispense: 30 tablet; Refill: 0    4. Generalized anxiety disorder    Other orders  -     hydrOXYzine  (ATARAX) 25 MG tablet; Take 1 tablet by mouth 2 (Two) Times a Day As Needed for Anxiety.  Dispense: 60 tablet; Refill: 0  -     desvenlafaxine (Pristiq) 100 MG 24 hr tablet; Take 1 tablet by mouth Daily.  Dispense: 30 tablet; Refill: 0  -     buPROPion XL (Wellbutrin XL) 300 MG 24 hr tablet; Take 1 tablet by mouth Every Morning.  Dispense: 30 tablet; Refill: 0        Visit Diagnoses:    ICD-10-CM ICD-9-CM   1. Severe episode of recurrent major depressive disorder, without psychotic features  F33.2 296.33   2. Adult ADHD  F90.9 314.01   3. Excessive daytime sleepiness  G47.19 780.54   4. Generalized anxiety disorder  F41.1 300.02         GOALS:  Short Term Goals: Patient will be compliant with medication, and patient will have no significant medication related side effects.  Patient will be engaged in psychotherapy as indicated.  Patient will report subjective improvement of symptoms.  Long term goals: To stabilize mood and treat/improve subjective symptoms, the patient will stay out of the hospital, the patient will be at an optimal level of functioning, and the patient will take all medications as prescribed.  The patient/guardian verbalized understanding and agreement with goals that were mutually set.      TREATMENT PLAN: Continue supportive psychotherapy efforts and medications as indicated.  Pharmacological and Non-Pharmacological treatment options discussed during today's visit. Patient/Guardian acknowledged and verbally consented with current treatment plan and was educated on the importance of compliance with treatment and follow-up appointments.      MEDICATION ISSUES:  Discussed medication options and treatment plan of prescribed medication as well as the risks, benefits, any black box warnings, and side effects including potential falls, possible impaired driving, and metabolic adversities among others. Patient is agreeable to call the office with any worsening of symptoms or onset of side effects, or if  any concerns or questions arise.  The contact information for the office is made available to the patient. Patient is agreeable to call 911 or go to the nearest ER should they begin having any SI/HI, or if any urgent concerns arise. No medication side effects or related complaints today.     MEDS ORDERED DURING VISIT:  New Medications Ordered This Visit   Medications    lisdexamfetamine (VYVANSE) 60 MG capsule     Sig: Take 1 capsule by mouth Every Morning     Dispense:  30 capsule     Refill:  0    amphetamine-dextroamphetamine (Adderall) 15 MG tablet     Sig: Take 1 tablet by mouth Daily. Take at lunchtime for breakthrough ADHD/depressive symptoms.     Dispense:  30 tablet     Refill:  0    hydrOXYzine (ATARAX) 25 MG tablet     Sig: Take 1 tablet by mouth 2 (Two) Times a Day As Needed for Anxiety.     Dispense:  60 tablet     Refill:  0    desvenlafaxine (Pristiq) 100 MG 24 hr tablet     Sig: Take 1 tablet by mouth Daily.     Dispense:  30 tablet     Refill:  0    buPROPion XL (Wellbutrin XL) 300 MG 24 hr tablet     Sig: Take 1 tablet by mouth Every Morning.     Dispense:  30 tablet     Refill:  0     Plan:  - Yuni has been on Adderall 30 mg twice daily for a couple of months now and still finds that even is a 30 mg dose is only lasting 2 to 3 hours before losing efficacy.  - Discontinue Adderall 30 mg by mouth twice daily and replace with Vyvanse 60 mg by mouth every morning and Adderall 15 mg by mouth at lunchtime for breakthrough ADHD symptoms.  -Continue Wellbutrin  mg by mouth every morning for depression, anxiety, and ADHD.  - Continue Pristiq 100 mg by mouth daily for depression and anxiety.  -Continue hydroxyzine 25 mg by mouth twice a day as needed for anxiety..  - Jimmy reviewed and is appropriate.  - Patient verbalizes understanding to go to nearest ED if SI/HI develop.    Follow Up Appointment:   No follow-ups on file.             This document has been electronically signed by Chrissy Yen  APRN  February 9, 2024 13:29 EST    Dictated Utilizing Dragon Dictation: Part of this note may be an electronic transcription/translation of spoken language to printed text using the Dragon Dictation System.

## 2024-02-12 DIAGNOSIS — F90.9 ADULT ADHD: ICD-10-CM

## 2024-02-12 DIAGNOSIS — G47.19 EXCESSIVE DAYTIME SLEEPINESS: ICD-10-CM

## 2024-02-12 DIAGNOSIS — F90.9 ADULT ADHD: Primary | ICD-10-CM

## 2024-02-12 RX ORDER — LISDEXAMFETAMINE DIMESYLATE CAPSULES 60 MG/1
60 CAPSULE ORAL EVERY MORNING
Qty: 30 CAPSULE | Refills: 0 | Status: SHIPPED | OUTPATIENT
Start: 2024-02-12 | End: 2024-02-12

## 2024-02-12 RX ORDER — LISDEXAMFETAMINE DIMESYLATE 60 MG/1
60 CAPSULE ORAL EVERY MORNING
Qty: 30 CAPSULE | Refills: 0 | Status: SHIPPED | OUTPATIENT
Start: 2024-02-12

## 2024-03-08 ENCOUNTER — PRIOR AUTHORIZATION (OUTPATIENT)
Dept: PSYCHIATRY | Facility: CLINIC | Age: 44
End: 2024-03-08

## 2024-03-08 ENCOUNTER — OFFICE VISIT (OUTPATIENT)
Dept: PSYCHIATRY | Facility: CLINIC | Age: 44
End: 2024-03-08
Payer: COMMERCIAL

## 2024-03-08 VITALS
HEART RATE: 76 BPM | DIASTOLIC BLOOD PRESSURE: 79 MMHG | HEIGHT: 60 IN | SYSTOLIC BLOOD PRESSURE: 128 MMHG | BODY MASS INDEX: 39.07 KG/M2 | WEIGHT: 199 LBS

## 2024-03-08 DIAGNOSIS — F41.1 GENERALIZED ANXIETY DISORDER: Primary | ICD-10-CM

## 2024-03-08 DIAGNOSIS — F33.1 MAJOR DEPRESSIVE DISORDER, RECURRENT EPISODE, MODERATE: ICD-10-CM

## 2024-03-08 DIAGNOSIS — G47.19 EXCESSIVE DAYTIME SLEEPINESS: ICD-10-CM

## 2024-03-08 DIAGNOSIS — F90.9 ADULT ADHD: ICD-10-CM

## 2024-03-08 RX ORDER — FEXOFENADINE HCL 180 MG/1
1 TABLET ORAL DAILY
COMMUNITY
Start: 2024-02-16

## 2024-03-08 RX ORDER — FLUOXETINE HYDROCHLORIDE 40 MG/1
40 CAPSULE ORAL DAILY
Qty: 30 CAPSULE | Refills: 0 | Status: SHIPPED | OUTPATIENT
Start: 2024-03-08 | End: 2025-03-08

## 2024-03-08 RX ORDER — BENAZEPRIL HYDROCHLORIDE 10 MG/1
1 TABLET ORAL DAILY
COMMUNITY
Start: 2023-12-19

## 2024-03-08 RX ORDER — ONDANSETRON HYDROCHLORIDE 8 MG/1
8 TABLET, FILM COATED ORAL DAILY PRN
Qty: 30 TABLET | Refills: 0 | Status: SHIPPED | OUTPATIENT
Start: 2024-03-08 | End: 2025-03-08

## 2024-03-08 RX ORDER — DEXTROAMPHETAMINE SACCHARATE, AMPHETAMINE ASPARTATE, DEXTROAMPHETAMINE SULFATE AND AMPHETAMINE SULFATE 3.75; 3.75; 3.75; 3.75 MG/1; MG/1; MG/1; MG/1
TABLET ORAL
Qty: 120 TABLET | Refills: 0 | Status: SHIPPED | OUTPATIENT
Start: 2024-03-08 | End: 2025-03-08

## 2024-03-08 RX ORDER — DESVENLAFAXINE 100 MG/1
100 TABLET, EXTENDED RELEASE ORAL DAILY
Qty: 30 TABLET | Refills: 0 | Status: SHIPPED | OUTPATIENT
Start: 2024-03-08 | End: 2024-03-08

## 2024-03-08 RX ORDER — ERGOCALCIFEROL 1.25 MG/1
1 CAPSULE ORAL WEEKLY
COMMUNITY
Start: 2024-01-11

## 2024-03-08 RX ORDER — TOPIRAMATE 50 MG/1
50 TABLET, FILM COATED ORAL DAILY
Qty: 30 TABLET | Refills: 0 | Status: SHIPPED | OUTPATIENT
Start: 2024-03-08

## 2024-03-08 NOTE — PROGRESS NOTES
Subjective     Yuni Garcia is a 44 y.o. female who presents today for follow up    Chief Complaint: Depression       History of Present Illness:    History of Present Illness  Celeste is a 44-year-old female who presents today for a follow-up visit with me.  At her last visit 1 month ago she was started on Vyvanse after switching from 30 mg of Adderall twice daily.  She tells me that she has not noticed any benefit yet since she had started taking the Vyvanse and feels like she tolerated the lower doses of Adderall better but was only getting relief for 1 to 2 hours.  She states that at the most she gets 2-1/2 hours of relief while taking immediate release Adderall but states that it is the same amount of time that she experiences relief while taking an extended release dose.  She tells me that if she does not have her medications, she could spend 20 hours/day in bed sleeping.  She tells me that she has also noticed that she has been crying more often and has been having a lot of mood swings.  She tells me that her  has told her that she has been a lot more wasserman.  She denies any SI/HI/AVH.  Sleep is still somewhat excessive, especially without a stimulant medication.  Appetite is fair.  Denies any cardiac issues.       The following portions of the patient's history were reviewed and updated as appropriate: allergies, current medications, past family history, past medical history, past social history, past surgical history and problem list.    Past Psychiatric History:  Began Treatment: Several years ago.  She has been primarily treated from her primary care provider but has seen two PMHNP's for 1 visit each.  Diagnoses:Depression, Anxiety, and OCD, insomnia  Psychiatrist: Has seen Kandis Sneed and Amadeo here in the past.  Therapist:Denies  Admission History:Denies  Medication Trials: zoloft, lexapro, celexa, effexor, cymbalta, prozac, wellbutrin, vraylar, caplyta, abilify, trazodone  Self Harm:  Denies  Suicide Attempts:Denies   Psychosis, Anxiety, Depression: Denies    Past Medical History:  Past Medical History:   Diagnosis Date    ADHD (attention deficit hyperactivity disorder)     Depression     HTN (hypertension)        Substance Abuse History:   Types:Denies all, including illicit  Withdrawal Symptoms:Denies  Longest Period Sober:Not Applicable   AA: Not applicable     Social History:  Social History     Socioeconomic History    Marital status:    Tobacco Use    Smoking status: Never    Smokeless tobacco: Never   Vaping Use    Vaping status: Never Used   Substance and Sexual Activity    Alcohol use: Never    Drug use: Never    Sexual activity: Defer       Family History:  Family History   Problem Relation Age of Onset    Diabetes Mother     Heart disease Father        Past Surgical History:  Past Surgical History:   Procedure Laterality Date    BARIATRIC SURGERY      BREAST SURGERY      lift    GALLBLADDER SURGERY      STOMACH SURGERY      tummy tuck    TUBAL ABDOMINAL LIGATION         Problem List:  Patient Active Problem List   Diagnosis    Abdominal wall bulge       Allergy:   Allergies   Allergen Reactions    Latex Hives    Bactrim [Sulfamethoxazole-Trimethoprim] Rash        Current Medications:   Current Outpatient Medications   Medication Sig Dispense Refill    amLODIPine (NORVASC) 5 MG tablet       amphetamine-dextroamphetamine (Adderall) 15 MG tablet Take 2 tablets by mouth Daily AND 1 tablet 2 (Two) Times a Day. 120 tablet 0    benazepril (LOTENSIN) 10 MG tablet Take 1 tablet by mouth Daily.      cloNIDine (CATAPRES) 0.3 MG tablet Take 1 tablet by mouth Daily.      cyanocobalamin 1000 MCG/ML injection       Diclofenac Sodium (VOLTAREN) 1 % gel gel diclofenac 1 % topical gel      EQ Aspirin Adult Low Dose 81 MG EC tablet Take 1 tablet by mouth Daily.      esomeprazole (nexIUM) 40 MG capsule Take 1 capsule by mouth Daily.      ferrous sulfate 324 (65 Fe) MG tablet  delayed-release EC tablet Take 1 tablet by mouth Daily.      fexofenadine (ALLEGRA) 180 MG tablet Take 1 tablet by mouth Daily.      fluconazole (DIFLUCAN) 150 MG tablet TAKE 1 TABLET BY MOUTH ONCE DAILY FOR 3 DAYS      fluticasone (FLONASE) 50 MCG/ACT nasal spray       furosemide (LASIX) 40 MG tablet Take 1 tablet by mouth Daily.      HYDROcodone-acetaminophen (NORCO) 7.5-325 MG per tablet Take 1 tablet by mouth Every 8 (Eight) Hours As Needed. for pain      hydrOXYzine (ATARAX) 25 MG tablet Take 1 tablet by mouth 2 (Two) Times a Day As Needed for Anxiety. 60 tablet 0    levothyroxine (SYNTHROID, LEVOTHROID) 150 MCG tablet Take 1 tablet by mouth Daily.      methocarbamol (ROBAXIN) 750 MG tablet TAKE 1 TABLET BY MOUTH 4 TIMES DAILY AS NEEDED      nitroglycerin (NITROSTAT) 0.4 MG SL tablet Place 1 tablet under the tongue Every 5 (Five) Minutes As Needed for Chest Pain. do not exceed a total of 3 doses in 15 minutes      ondansetron (ZOFRAN) 8 MG tablet Take 1 mg by mouth Every 8 (Eight) Hours As Needed for Nausea or Vomiting.      phenazopyridine (PYRIDIUM) 100 MG tablet TAKE 1 TABLET BY MOUTH THREE TIMES DAILY AFTER A MEAL AS NEEDED      potassium chloride 10 MEQ CR tablet TAKE 3 TABLETS BY MOUTH TWICE DAILY WITH FOOD      pregabalin (LYRICA) 50 MG capsule Take 3 capsules by mouth Every 12 (Twelve) Hours.      vitamin B-12 (CYANOCOBALAMIN) 1000 MCG tablet Take 1 tablet by mouth 1 (One) Time Per Week.      vitamin D (ERGOCALCIFEROL) 1.25 MG (59147 UT) capsule capsule Take 1 capsule by mouth 1 (One) Time Per Week.      FLUoxetine (PROzac) 40 MG capsule Take 1 capsule by mouth Daily. 30 capsule 0    hydroCHLOROthiazide (HYDRODIURIL) 25 MG tablet Take 25 mg by mouth Daily. (Patient not taking: Reported on 9/14/2023)      levoFLOXacin (LEVAQUIN) 500 MG tablet take 1 tablet by mouth every 24 hours (Patient not taking: Reported on 3/8/2024)      losartan (COZAAR) 50 MG tablet Take 1 tablet by mouth Daily. (Patient not  "taking: Reported on 3/8/2024)      ondansetron (Zofran) 8 MG tablet Take 1 tablet by mouth Daily As Needed for Nausea or Vomiting. Take with medications in the morning related to nausea with prescription medications 30 tablet 0    progesterone (PROMETRIUM) 100 MG capsule Take 100 mg by mouth Daily. (Patient not taking: Reported on 9/14/2023)      topiramate (Topamax) 50 MG tablet Take 1 tablet by mouth Daily. 30 tablet 0     No current facility-administered medications for this visit.       Review of Systems:    Review of Systems   Constitutional:  Positive for fatigue.   Respiratory: Negative.     Cardiovascular: Negative.    Neurological:  Negative for headache.   Psychiatric/Behavioral:  Positive for decreased concentration, dysphoric mood, depressed mood and stress. Negative for agitation, behavioral problems, hallucinations, self-injury, sleep disturbance, suicidal ideas and negative for hyperactivity. The patient is not nervous/anxious.          Physical Exam:   Physical Exam  Vitals reviewed.   Constitutional:       Appearance: Normal appearance.   Pulmonary:      Effort: Pulmonary effort is normal.   Musculoskeletal:         General: Normal range of motion.      Cervical back: Normal range of motion.   Neurological:      Mental Status: She is alert and oriented to person, place, and time. Mental status is at baseline.   Psychiatric:         Attention and Perception: Attention and perception normal. She is attentive.         Mood and Affect: Mood is depressed. Affect is not flat or tearful.         Speech: Speech normal.         Behavior: Behavior normal. Behavior is cooperative.         Thought Content: Thought content normal.         Cognition and Memory: Cognition normal. Memory is impaired.         Judgment: Judgment normal.         Vitals:  Blood pressure 128/79, pulse 76, height 152.4 cm (60\"), weight 90.3 kg (199 lb).   Body mass index is 38.86 kg/m².    Last 3 Blood Pressure Readings:  BP Readings " from Last 3 Encounters:   03/08/24 128/79   12/07/23 138/80   11/07/23 138/80       PHQ-9 Score:   PHQ-9 Total Score:      JUSTUS-7 Score:         Mental Status Exam:   Hygiene:   good  Cooperation:  Cooperative  Eye Contact:  Good  Psychomotor Behavior:  Appropriate  Affect:  Full range  Mood: normal  Hopelessness: 5  Speech:  Normal  Thought Process:  Linear  Thought Content:  Normal  Suicidal:  None  Homicidal:  None  Hallucinations:  None  Delusion:  None  Memory:  Intact  Orientation:  Person, Place, Time, and Situation  Reliability:  good  Insight:  Good  Judgement:  Fair  Impulse Control:  Fair  Physical/Medical Issues:  Yes see past medical history        Lab Results:   No visits with results within 3 Month(s) from this visit.   Latest known visit with results is:   Office Visit on 09/14/2023   Component Date Value Ref Range Status    External Amphetamine Screen Urine 09/14/2023 Negative   Final    External Benzodiazepine Screen Uri* 09/14/2023 Negative   Final    External Cocaine Screen Urine 09/14/2023 Negative   Final    External THC Screen Urine 09/14/2023 Negative   Final    External Methadone Screen Urine 09/14/2023 Negative   Final    External Methamphetamine Screen Ur* 09/14/2023 Negative   Final    External Oxycodone Screen Urine 09/14/2023 Negative   Final    External Buprenorphine Screen Urine 09/14/2023 Negative   Final    External MDMA 09/14/2023 Negative   Final    External Opiates Screen Urine 09/14/2023 Positive (A)   Final         Assessment & Plan   Diagnoses and all orders for this visit:    1. Generalized anxiety disorder (Primary)    2. Adult ADHD  -     amphetamine-dextroamphetamine (Adderall) 15 MG tablet; Take 2 tablets by mouth Daily AND 1 tablet 2 (Two) Times a Day.  Dispense: 120 tablet; Refill: 0    3. Excessive daytime sleepiness  -     amphetamine-dextroamphetamine (Adderall) 15 MG tablet; Take 2 tablets by mouth Daily AND 1 tablet 2 (Two) Times a Day.  Dispense: 120 tablet;  Refill: 0    4. Major depressive disorder, recurrent episode, moderate    Other orders  -     Discontinue: desvenlafaxine (Pristiq) 100 MG 24 hr tablet; Take 1 tablet by mouth Daily.  Dispense: 30 tablet; Refill: 0  -     FLUoxetine (PROzac) 40 MG capsule; Take 1 capsule by mouth Daily.  Dispense: 30 capsule; Refill: 0  -     topiramate (Topamax) 50 MG tablet; Take 1 tablet by mouth Daily.  Dispense: 30 tablet; Refill: 0  -     ondansetron (Zofran) 8 MG tablet; Take 1 tablet by mouth Daily As Needed for Nausea or Vomiting. Take with medications in the morning related to nausea with prescription medications  Dispense: 30 tablet; Refill: 0              Visit Diagnoses:    ICD-10-CM ICD-9-CM   1. Generalized anxiety disorder  F41.1 300.02   2. Adult ADHD  F90.9 314.01   3. Excessive daytime sleepiness  G47.19 780.54   4. Major depressive disorder, recurrent episode, moderate  F33.1 296.32             GOALS:  Short Term Goals: Patient will be compliant with medication, and patient will have no significant medication related side effects.  Patient will be engaged in psychotherapy as indicated.  Patient will report subjective improvement of symptoms.  Long term goals: To stabilize mood and treat/improve subjective symptoms, the patient will stay out of the hospital, the patient will be at an optimal level of functioning, and the patient will take all medications as prescribed.  The patient/guardian verbalized understanding and agreement with goals that were mutually set.      TREATMENT PLAN: Continue supportive psychotherapy efforts and medications as indicated.  Pharmacological and Non-Pharmacological treatment options discussed during today's visit. Patient/Guardian acknowledged and verbally consented with current treatment plan and was educated on the importance of compliance with treatment and follow-up appointments.      MEDICATION ISSUES:  Discussed medication options and treatment plan of prescribed medication as  well as the risks, benefits, any black box warnings, and side effects including potential falls, possible impaired driving, and metabolic adversities among others. Patient is agreeable to call the office with any worsening of symptoms or onset of side effects, or if any concerns or questions arise.  The contact information for the office is made available to the patient. Patient is agreeable to call 911 or go to the nearest ER should they begin having any SI/HI, or if any urgent concerns arise. No medication side effects or related complaints today.     MEDS ORDERED DURING VISIT:  New Medications Ordered This Visit   Medications    amphetamine-dextroamphetamine (Adderall) 15 MG tablet     Sig: Take 2 tablets by mouth Daily AND 1 tablet 2 (Two) Times a Day.     Dispense:  120 tablet     Refill:  0    FLUoxetine (PROzac) 40 MG capsule     Sig: Take 1 capsule by mouth Daily.     Dispense:  30 capsule     Refill:  0    topiramate (Topamax) 50 MG tablet     Sig: Take 1 tablet by mouth Daily.     Dispense:  30 tablet     Refill:  0    ondansetron (Zofran) 8 MG tablet     Sig: Take 1 tablet by mouth Daily As Needed for Nausea or Vomiting. Take with medications in the morning related to nausea with prescription medications     Dispense:  30 tablet     Refill:  0     Plan:    - Discontinue Pristiq 100 mg p.o. daily for depression and anxiety.  She states this has made her very sick to her stomach and does not feel like it has been helping for depression and anxiety.  - Discontinue Vyvanse.  She states that she did not notice much of a benefit at all while taking this medication compared to taking Adderall.  - Patient only finds temporary relief even when taking 30 mg of Adderall or extended release Adderall.  She feels better taking a lower dose more frequently throughout the day.  - Start Adderall 15mg take 2 in the morning, 1 at lunch, and 1 in the afternoon.  - Continue Wellbutrin XL by taking 150 mg by mouth daily for a  week then discontinuing.  - Patient reported previous remission of depressive and mood symptoms when she was taking 80 mg of Prozac accompanied by Topamax for mood.  We will start Prozac 40 mg by mouth daily and Topamax 50 mg by mouth daily for mood  - Jimmy reviewed at this time and is appropriate.  - Discussed with patient possible side effects to include tachycardia, hypertension, cardiac death, anxiety, irritability, and mood fluctuations.  Patient verbalizes understanding and is aware to stop the medication and seek medical attention immediately if she begins to experience any of these symptoms.  - Encourage patient to go to nearest ED if SI/HI develop.    Follow Up Appointment:   Return in about 4 weeks (around 4/5/2024) for Recheck.             This document has been electronically signed by KRISHNA Burgess  March 8, 2024 14:00 EST    Dictated Utilizing Dragon Dictation: Part of this note may be an electronic transcription/translation of spoken language to printed text using the Dragon Dictation System.

## 2024-03-08 NOTE — TELEPHONE ENCOUNTER
lakeshia gutierrez (Key: [id number])  PA Case ID #: [id number]    Drug  Amphetamine-Dextroamphetamine 15MG tablets  ePA cloud logo  Form  [employer] [place] [employer] ePA Form 2017 NCP[place]

## 2024-03-08 NOTE — TELEPHONE ENCOUNTER
[employer] is reviewing your PA request. You may close this dialog, return to your dashboard, and perform other tasks.    To check for an update later, open this request again from your dashboard. If [facility] [address] has not replied within 24 hours for urgent requests or within 48 hours for standard requests, please contact [facility] [address] at [phone].

## 2024-04-10 ENCOUNTER — TELEPHONE (OUTPATIENT)
Dept: PSYCHIATRY | Facility: CLINIC | Age: 44
End: 2024-04-10

## 2024-04-10 ENCOUNTER — TELEMEDICINE (OUTPATIENT)
Dept: PSYCHIATRY | Facility: CLINIC | Age: 44
End: 2024-04-10
Payer: COMMERCIAL

## 2024-04-10 DIAGNOSIS — F90.9 ADULT ADHD: ICD-10-CM

## 2024-04-10 DIAGNOSIS — G47.19 EXCESSIVE DAYTIME SLEEPINESS: ICD-10-CM

## 2024-04-10 DIAGNOSIS — F41.1 GENERALIZED ANXIETY DISORDER: Primary | ICD-10-CM

## 2024-04-10 DIAGNOSIS — F33.1 MAJOR DEPRESSIVE DISORDER, RECURRENT EPISODE, MODERATE: ICD-10-CM

## 2024-04-10 RX ORDER — DEXTROAMPHETAMINE SACCHARATE, AMPHETAMINE ASPARTATE, DEXTROAMPHETAMINE SULFATE AND AMPHETAMINE SULFATE 3.75; 3.75; 3.75; 3.75 MG/1; MG/1; MG/1; MG/1
TABLET ORAL
Qty: 120 TABLET | Refills: 0 | Status: SHIPPED | OUTPATIENT
Start: 2024-04-10 | End: 2025-04-10

## 2024-04-10 RX ORDER — FLUOXETINE HYDROCHLORIDE 40 MG/1
40 CAPSULE ORAL DAILY
Qty: 30 CAPSULE | Refills: 1 | Status: SHIPPED | OUTPATIENT
Start: 2024-04-10 | End: 2025-04-10

## 2024-04-10 RX ORDER — TOPIRAMATE 50 MG/1
50 TABLET, FILM COATED ORAL DAILY
Qty: 30 TABLET | Refills: 1 | Status: SHIPPED | OUTPATIENT
Start: 2024-04-10

## 2024-04-10 NOTE — PROGRESS NOTES
Discussed use of daily fiber every morning with lots of water to help with constipation. Do not recommend daily laxatives, only recommend use if she goes several days without a bowel movement. This provider is located at the [facility] (through [facility]), [address] [place] [address], [id number] using a secure ftopiahart Video Visit through [employer]. Patient is being seen remotely via telehealth at their home address in [place], and stated they are in a secure environment for this session. The patient's condition being diagnosed/treated is appropriate for telemedicine. The provider identified herself as well as her credentials.  The patient, and/or patients guardian, consent to be seen remotely, and when consent is given they understand that the consent allows for patient identifiable information to be sent to a third party as needed.   They may refuse to be seen remotely at any time. The electronic data is encrypted and password protected, and the patient and/or guardian has been advised of the potential risks to privacy not withstanding such measures.    You have chosen to receive care through a telehealth visit.  Do you consent to use a video/audio connection for your medical care today? Yes    Patient identifiers utilized: Name and date of birth.    Patient verbally confirmed consent for today's encounter:  April 10, 2024    Subjective   Yuni Garcia is a 44 y.o. female who presents today for follow up    Chief Complaint:  No chief complaint on file.       History of Present Illness:    History of Present Illness  Yuni is a 44-year-old female who presents today for a follow-up visit with me.  At her last visit 1 month ago she was started on Adderall 30 mg in the morning with 2 15 mg doses for breakthrough symptoms during the day.  He has also noticed a benefit since restarting Prozac 40 mg and Topamax 50 mg daily.  She denies having any major mood swings.  Denies any side effects to the medications.  Denies any chest pain.  Denies SI/HI/AVH.  States that she is sleeping well at night and is not excessively tired during the day.  Appetite has been good.  She is able to get her household tasks done  and is able to complete her work.         The following portions of the patient's history were reviewed and updated as appropriate: allergies, current medications, past family history, past medical history, past social history, past surgical history and problem list.    Past Psychiatric History:  Began Treatment: Several years ago.  She has been primarily treated from her primary care provider but has seen two PMHNP's for 1 visit each.  Diagnoses:Depression, Anxiety, and OCD, insomnia  Psychiatrist: Has seen Kandis Sneed and Amadeo here in the past.  Therapist:Denies  Admission History:Denies  Medication Trials: zoloft, lexapro, celexa, effexor, cymbalta, prozac, wellbutrin, vraylar, caplyta, abilify, trazodone  Self Harm: Denies  Suicide Attempts:Denies   Psychosis, Anxiety, Depression: Denies    Past Medical History:  Past Medical History:   Diagnosis Date    ADHD (attention deficit hyperactivity disorder)     Depression     HTN (hypertension)        Substance Abuse History:   Denies    Social History:  Social History     Socioeconomic History    Marital status:    Tobacco Use    Smoking status: Never    Smokeless tobacco: Never   Vaping Use    Vaping status: Never Used   Substance and Sexual Activity    Alcohol use: Never    Drug use: Never    Sexual activity: Defer       Family History:  Family History   Problem Relation Age of Onset    Diabetes Mother     Heart disease Father        Past Surgical History:  Past Surgical History:   Procedure Laterality Date    BARIATRIC SURGERY      BREAST SURGERY      lift    GALLBLADDER SURGERY      STOMACH SURGERY      tummy tuck    TUBAL ABDOMINAL LIGATION         Problem List:  Patient Active Problem List   Diagnosis    Abdominal wall bulge       Allergy:   Allergies   Allergen Reactions    Latex Hives    Bactrim [Sulfamethoxazole-Trimethoprim] Rash        Current Medications:   Current Outpatient Medications   Medication Sig Dispense Refill     amphetamine-dextroamphetamine (Adderall) 15 MG tablet Take 2 tablets by mouth Daily AND 1 tablet 2 (Two) Times a Day. 120 tablet 0    FLUoxetine (PROzac) 40 MG capsule Take 1 capsule by mouth Daily. 30 capsule 1    topiramate (Topamax) 50 MG tablet Take 1 tablet by mouth Daily. 30 tablet 1    amLODIPine (NORVASC) 5 MG tablet       benazepril (LOTENSIN) 10 MG tablet Take 1 tablet by mouth Daily.      cloNIDine (CATAPRES) 0.3 MG tablet Take 1 tablet by mouth Daily.      cyanocobalamin 1000 MCG/ML injection       Diclofenac Sodium (VOLTAREN) 1 % gel gel diclofenac 1 % topical gel      EQ Aspirin Adult Low Dose 81 MG EC tablet Take 1 tablet by mouth Daily.      esomeprazole (nexIUM) 40 MG capsule Take 1 capsule by mouth Daily.      ferrous sulfate 324 (65 Fe) MG tablet delayed-release EC tablet Take 1 tablet by mouth Daily.      fexofenadine (ALLEGRA) 180 MG tablet Take 1 tablet by mouth Daily.      fluconazole (DIFLUCAN) 150 MG tablet TAKE 1 TABLET BY MOUTH ONCE DAILY FOR 3 DAYS      fluticasone (FLONASE) 50 MCG/ACT nasal spray       furosemide (LASIX) 40 MG tablet Take 1 tablet by mouth Daily.      hydroCHLOROthiazide (HYDRODIURIL) 25 MG tablet Take 25 mg by mouth Daily. (Patient not taking: Reported on 9/14/2023)      HYDROcodone-acetaminophen (NORCO) 7.5-325 MG per tablet Take 1 tablet by mouth Every 8 (Eight) Hours As Needed. for pain      hydrOXYzine (ATARAX) 25 MG tablet Take 1 tablet by mouth 2 (Two) Times a Day As Needed for Anxiety. 60 tablet 0    levoFLOXacin (LEVAQUIN) 500 MG tablet take 1 tablet by mouth every 24 hours (Patient not taking: Reported on 3/8/2024)      levothyroxine (SYNTHROID, LEVOTHROID) 150 MCG tablet Take 1 tablet by mouth Daily.      losartan (COZAAR) 50 MG tablet Take 1 tablet by mouth Daily. (Patient not taking: Reported on 3/8/2024)      methocarbamol (ROBAXIN) 750 MG tablet TAKE 1 TABLET BY MOUTH 4 TIMES DAILY AS NEEDED      nitroglycerin (NITROSTAT) 0.4 MG SL tablet Place 1 tablet  under the tongue Every 5 (Five) Minutes As Needed for Chest Pain. do not exceed a total of 3 doses in 15 minutes      ondansetron (ZOFRAN) 8 MG tablet Take 1 mg by mouth Every 8 (Eight) Hours As Needed for Nausea or Vomiting.      ondansetron (Zofran) 8 MG tablet Take 1 tablet by mouth Daily As Needed for Nausea or Vomiting. Take with medications in the morning related to nausea with prescription medications 30 tablet 0    phenazopyridine (PYRIDIUM) 100 MG tablet TAKE 1 TABLET BY MOUTH THREE TIMES DAILY AFTER A MEAL AS NEEDED      potassium chloride 10 MEQ CR tablet TAKE 3 TABLETS BY MOUTH TWICE DAILY WITH FOOD      pregabalin (LYRICA) 50 MG capsule Take 3 capsules by mouth Every 12 (Twelve) Hours.      progesterone (PROMETRIUM) 100 MG capsule Take 100 mg by mouth Daily. (Patient not taking: Reported on 9/14/2023)      vitamin B-12 (CYANOCOBALAMIN) 1000 MCG tablet Take 1 tablet by mouth 1 (One) Time Per Week.      vitamin D (ERGOCALCIFEROL) 1.25 MG (82226 UT) capsule capsule Take 1 capsule by mouth 1 (One) Time Per Week.       No current facility-administered medications for this visit.       Review of Systems:    Review of Systems   Constitutional:  Negative for fatigue.   Respiratory: Negative.     Cardiovascular: Negative.    Neurological:  Negative for headache.   Psychiatric/Behavioral:  Positive for stress. Negative for agitation, behavioral problems, decreased concentration, dysphoric mood, hallucinations, self-injury, sleep disturbance, suicidal ideas, negative for hyperactivity and depressed mood. The patient is not nervous/anxious.          Physical Exam:   Physical Exam  Constitutional:       Appearance: Normal appearance.   Pulmonary:      Effort: Pulmonary effort is normal.   Neurological:      Mental Status: She is alert and oriented to person, place, and time. Mental status is at baseline.   Psychiatric:         Attention and Perception: Attention and perception normal. She is attentive.         Mood  and Affect: Mood is not depressed. Affect is not flat or tearful.         Speech: Speech normal.         Behavior: Behavior normal. Behavior is cooperative.         Thought Content: Thought content normal.         Cognition and Memory: Cognition normal. Memory is not impaired.         Judgment: Judgment normal.         Vitals:  There were no vitals taken for this visit. There is no height or weight on file to calculate BMI.  Due to extenuating circumstances and possible current health risks associated with the patient being present in a clinical setting (with current health restrictions in place in regards to possible COVID 19 transmission/exposure), the patient was seen remotely today via a [employer] Video Visit through [employer].  Unable to obtain vital signs due to nature of remote visit.      Last 3 Blood Pressure Readings:  BP Readings from Last 3 Encounters:   03/08/24 128/79   12/07/23 138/80   11/07/23 138/80       PHQ-9 Score:   PHQ-9 Total Score: PHQ-9 Depression Screening  Little interest or pleasure in doing things? (P) 1-->several days   Feeling down, depressed, or hopeless? (P) 1-->several days   Trouble falling or staying asleep, or sleeping too much? (P) 1-->several days   Feeling tired or having little energy? (P) 1-->several days   Poor appetite or overeating? (P) 1-->several days   Feeling bad about yourself - or that you are a failure or have let yourself or your family down? (P) 0-->not at all   Trouble concentrating on things, such as reading the newspaper or watching television? (P) 1-->several days   Moving or speaking so slowly that other people could have noticed? Or the opposite - being so fidgety or restless that you have been moving around a lot more than usual? (P) 0-->not at all   Thoughts that you would be better off dead, or of hurting yourself in some way? (P) 0-->not at all   PHQ-9 Total Score (P) 6   If you checked off any problems, how difficult have these problems made it for you to do  your work, take care of things at home, or get along with other people? (P) somewhat difficult         JUSTUS-7 Score:   Feeling nervous, anxious or on edge: (P) Not at all  Not being able to stop or control worrying: (P) Not at all  Worrying too much about different things: (P) Not at all  Trouble Relaxing: (P) Not at all  Being so restless that it is hard to sit still: (P) Several days  Feeling afraid as if something awful might happen: (P) Not at all  Becoming easily annoyed or irritable: (P) Several days  JUSTUS 7 Total Score: (P) 2  If you checked any problems, how difficult have these problems made it for you to do your work, take care of things at home, or get along with other people: (P) Somewhat difficult     Mental Status Exam:   Hygiene:   good  Cooperation:  Cooperative  Eye Contact:  Good  Psychomotor Behavior:  Appropriate  Affect:  Full range  Mood: normal  Hopelessness: 5  Speech:  Normal  Thought Process:  Linear  Thought Content:  Normal  Suicidal:  None  Homicidal:  None  Hallucinations:  None  Delusion:  None  Memory:  Intact  Orientation:  Person, Place, Time, and Situation  Reliability:  good  Insight:  Good  Judgement:  Fair  Impulse Control:  Fair  Physical/Medical Issues:  Yes see past medical history       Lab Results:   No visits with results within 3 Month(s) from this visit.   Latest known visit with results is:   Office Visit on 09/14/2023   Component Date Value Ref Range Status    External Amphetamine Screen Urine 09/14/2023 Negative   Final    External Benzodiazepine Screen Uri* 09/14/2023 Negative   Final    External Cocaine Screen Urine 09/14/2023 Negative   Final    External THC Screen Urine 09/14/2023 Negative   Final    External Methadone Screen Urine 09/14/2023 Negative   Final    External Methamphetamine Screen Ur* 09/14/2023 Negative   Final    External Oxycodone Screen Urine 09/14/2023 Negative   Final    External Buprenorphine Screen Urine 09/14/2023 Negative   Final    External  MDMA 09/14/2023 Negative   Final    External Opiates Screen Urine 09/14/2023 Positive (A)   Final         Assessment & Plan   Diagnoses and all orders for this visit:    1. Generalized anxiety disorder (Primary)    2. Adult ADHD  -     amphetamine-dextroamphetamine (Adderall) 15 MG tablet; Take 2 tablets by mouth Daily AND 1 tablet 2 (Two) Times a Day.  Dispense: 120 tablet; Refill: 0    3. Excessive daytime sleepiness  -     amphetamine-dextroamphetamine (Adderall) 15 MG tablet; Take 2 tablets by mouth Daily AND 1 tablet 2 (Two) Times a Day.  Dispense: 120 tablet; Refill: 0    4. Major depressive disorder, recurrent episode, moderate    Other orders  -     topiramate (Topamax) 50 MG tablet; Take 1 tablet by mouth Daily.  Dispense: 30 tablet; Refill: 1  -     FLUoxetine (PROzac) 40 MG capsule; Take 1 capsule by mouth Daily.  Dispense: 30 capsule; Refill: 1        Visit Diagnoses:    ICD-10-CM ICD-9-CM   1. Generalized anxiety disorder  F41.1 300.02   2. Adult ADHD  F90.9 314.01   3. Excessive daytime sleepiness  G47.19 780.54   4. Major depressive disorder, recurrent episode, moderate  F33.1 296.32         GOALS:  Short Term Goals: Patient will be compliant with medication, and patient will have no significant medication related side effects.  Patient will be engaged in psychotherapy as indicated.  Patient will report subjective improvement of symptoms.  Long term goals: To stabilize mood and treat/improve subjective symptoms, the patient will stay out of the hospital, the patient will be at an optimal level of functioning, and the patient will take all medications as prescribed.  The patient/guardian verbalized understanding and agreement with goals that were mutually set.      TREATMENT PLAN: Continue supportive psychotherapy efforts and medications as indicated.  Pharmacological and Non-Pharmacological treatment options discussed during today's visit. Patient/Guardian acknowledged and verbally consented with  current treatment plan and was educated on the importance of compliance with treatment and follow-up appointments.      MEDICATION ISSUES:  Discussed medication options and treatment plan of prescribed medication as well as the risks, benefits, any black box warnings, and side effects including potential falls, possible impaired driving, and metabolic adversities among others. Patient is agreeable to call the office with any worsening of symptoms or onset of side effects, or if any concerns or questions arise.  The contact information for the office is made available to the patient. Patient is agreeable to call 911 or go to the nearest ER should they begin having any SI/HI, or if any urgent concerns arise. No medication side effects or related complaints today.     MEDS ORDERED DURING VISIT:  New Medications Ordered This Visit   Medications    amphetamine-dextroamphetamine (Adderall) 15 MG tablet     Sig: Take 2 tablets by mouth Daily AND 1 tablet 2 (Two) Times a Day.     Dispense:  120 tablet     Refill:  0    topiramate (Topamax) 50 MG tablet     Sig: Take 1 tablet by mouth Daily.     Dispense:  30 tablet     Refill:  1    FLUoxetine (PROzac) 40 MG capsule     Sig: Take 1 capsule by mouth Daily.     Dispense:  30 capsule     Refill:  1     - Patient only finds temporary relief even when taking 30 mg of Adderall or extended release Adderall.  She feels better taking a lower dose more frequently throughout the day.  - Continue Adderall 15mg take 2 in the morning, 1 at lunch, and 1 in the afternoon.  -Continue Prozac 40mg daily for depression and anxiety.   -Continue Topamax 50 mg by mouth daily for mood.   - Jimmy reviewed at this time and is appropriate.  - Discussed with patient possible side effects to include tachycardia, hypertension, cardiac death, anxiety, irritability, and mood fluctuations.  Patient verbalizes understanding and is aware to stop the medication and seek medical attention immediately if she  begins to experience any of these symptoms.  - Encourage patient to go to nearest ED if SI/HI develop.      Follow Up Appointment:   Return in about 4 weeks (around 5/8/2024) for Recheck, Video visit.             This document has been electronically signed by KRISHNA Burgess  April 10, 2024 08:12 EDT    Dictated Utilizing Dragon Dictation: Part of this note may be an electronic transcription/translation of spoken language to printed text using the Dragon Dictation System.

## 2024-05-09 ENCOUNTER — TELEMEDICINE (OUTPATIENT)
Dept: PSYCHIATRY | Facility: CLINIC | Age: 44
End: 2024-05-09
Payer: COMMERCIAL

## 2024-05-09 DIAGNOSIS — F41.1 GENERALIZED ANXIETY DISORDER: ICD-10-CM

## 2024-05-09 DIAGNOSIS — F33.1 MAJOR DEPRESSIVE DISORDER, RECURRENT EPISODE, MODERATE: ICD-10-CM

## 2024-05-09 DIAGNOSIS — F90.9 ADULT ADHD: Primary | ICD-10-CM

## 2024-05-09 DIAGNOSIS — G47.19 EXCESSIVE DAYTIME SLEEPINESS: ICD-10-CM

## 2024-05-09 RX ORDER — FLUOXETINE HYDROCHLORIDE 20 MG/1
20 CAPSULE ORAL DAILY
Qty: 30 CAPSULE | Refills: 1 | Status: SHIPPED | OUTPATIENT
Start: 2024-05-09 | End: 2025-05-09

## 2024-05-09 RX ORDER — DEXTROAMPHETAMINE SACCHARATE, AMPHETAMINE ASPARTATE, DEXTROAMPHETAMINE SULFATE AND AMPHETAMINE SULFATE 3.75; 3.75; 3.75; 3.75 MG/1; MG/1; MG/1; MG/1
TABLET ORAL
Qty: 120 TABLET | Refills: 0 | Status: SHIPPED | OUTPATIENT
Start: 2024-05-09 | End: 2025-05-09

## 2024-05-09 RX ORDER — HYDROXYZINE HYDROCHLORIDE 25 MG/1
25 TABLET, FILM COATED ORAL 2 TIMES DAILY PRN
Qty: 60 TABLET | Refills: 0 | Status: SHIPPED | OUTPATIENT
Start: 2024-05-09

## 2024-05-09 RX ORDER — TOPIRAMATE 50 MG/1
50 TABLET, FILM COATED ORAL DAILY
Qty: 30 TABLET | Refills: 0 | Status: SHIPPED | OUTPATIENT
Start: 2024-05-09

## 2024-05-09 RX ORDER — FLUOXETINE HYDROCHLORIDE 40 MG/1
40 CAPSULE ORAL DAILY
Qty: 30 CAPSULE | Refills: 0 | Status: SHIPPED | OUTPATIENT
Start: 2024-05-09 | End: 2025-05-09

## 2024-06-06 ENCOUNTER — TELEMEDICINE (OUTPATIENT)
Dept: PSYCHIATRY | Facility: CLINIC | Age: 44
End: 2024-06-06
Payer: COMMERCIAL

## 2024-06-06 DIAGNOSIS — G47.19 EXCESSIVE DAYTIME SLEEPINESS: ICD-10-CM

## 2024-06-06 DIAGNOSIS — F41.1 GENERALIZED ANXIETY DISORDER: Primary | ICD-10-CM

## 2024-06-06 DIAGNOSIS — F90.9 ADULT ADHD: ICD-10-CM

## 2024-06-06 DIAGNOSIS — F33.1 MAJOR DEPRESSIVE DISORDER, RECURRENT EPISODE, MODERATE: ICD-10-CM

## 2024-06-06 RX ORDER — DEXTROAMPHETAMINE SACCHARATE, AMPHETAMINE ASPARTATE, DEXTROAMPHETAMINE SULFATE AND AMPHETAMINE SULFATE 3.75; 3.75; 3.75; 3.75 MG/1; MG/1; MG/1; MG/1
TABLET ORAL
Qty: 120 TABLET | Refills: 0 | Status: SHIPPED | OUTPATIENT
Start: 2024-06-06 | End: 2025-06-06

## 2024-06-06 RX ORDER — VENLAFAXINE 75 MG/1
75 TABLET ORAL DAILY
Qty: 30 TABLET | Refills: 1 | Status: SHIPPED | OUTPATIENT
Start: 2024-06-06

## 2024-06-06 RX ORDER — TOPIRAMATE 50 MG/1
50 TABLET, FILM COATED ORAL DAILY
Qty: 30 TABLET | Refills: 0 | Status: SHIPPED | OUTPATIENT
Start: 2024-06-06

## 2024-06-06 RX ORDER — HYDROXYZINE HYDROCHLORIDE 25 MG/1
25 TABLET, FILM COATED ORAL 2 TIMES DAILY PRN
Qty: 60 TABLET | Refills: 0 | Status: SHIPPED | OUTPATIENT
Start: 2024-06-06

## 2024-06-06 RX ORDER — FLUOXETINE HYDROCHLORIDE 40 MG/1
40 CAPSULE ORAL DAILY
Qty: 30 CAPSULE | Refills: 0 | Status: SHIPPED | OUTPATIENT
Start: 2024-06-06 | End: 2025-06-06

## 2024-06-06 RX ORDER — FLUOXETINE HYDROCHLORIDE 20 MG/1
20 CAPSULE ORAL DAILY
Qty: 30 CAPSULE | Refills: 1 | Status: SHIPPED | OUTPATIENT
Start: 2024-06-06 | End: 2025-06-06

## 2024-06-06 NOTE — PROGRESS NOTES
This provider is located at the [facility] (through [facility] [place] [facility]), [address] [place] [address], [id number] using a secure [employer] Video Visit through [employer]. Patient is being seen remotely via telehealth at their home address in [place], and stated they are in a secure environment for this session. The patient's condition being diagnosed/treated is appropriate for telemedicine. The provider identified herself as well as her credentials.  The patient, and/or patients guardian, consent to be seen remotely, and when consent is given they understand that the consent allows for patient identifiable information to be sent to a third party as needed.   They may refuse to be seen remotely at any time. The electronic data is encrypted and password protected, and the patient and/or guardian has been advised of the potential risks to privacy not withstanding such measures.    You have chosen to receive care through a telehealth visit.  Do you consent to use a video/audio connection for your medical care today? Yes    Patient identifiers utilized: Name and date of birth.    Patient verbally confirmed consent for today's encounter:  June 6, 2024    Subjective   Yuni Garcia is a 44 y.o. female who presents today for follow up    Chief Complaint:  ADHD, depression,    History of Present Illness:    History of Present Illness  Yuni is a 44-year-old female who presents today for a follow-up visit with me.  She tells me that she has noticed she is starting to isolate herself more and has lost motivation and interest to leave home.  She is working 3 12-hour shifts on Saturday, Sunday, and Monday's and tells me that the other days of the week she sometimes does not leave the house.  She does not have much social interaction with others and states that she hardly even talks to her mother or children.  She tells me that she often gets very anxious and stressed out about work.  She states very worried about walking and making sure  that they are locked.  At home sometimes she will check the door 4 or 5 times before she can go to sleep and states that she does the same when she is leaving work.  She states that her Adderall dose has been working well and she is able to get things done in a timely manner at work and home.  She is able to finish household tasks even after she gets home from work. Denies any SI/HI/AVH.  Denies any side effects to the medications.         The following portions of the patient's history were reviewed and updated as appropriate: allergies, current medications, past family history, past medical history, past social history, past surgical history and problem list.    Past Psychiatric History:  Began Treatment: Several years ago.  She has been primarily treated from her primary care provider but has seen two PMHNP's for 1 visit each.  Diagnoses:Depression, Anxiety, and OCD, insomnia  Psychiatrist: Has seen Kandis Sneed and Amadeo here in the past.  Therapist:Denies  Admission History:Denies  Medication Trials: zoloft, lexapro, celexa, effexor, cymbalta, prozac, wellbutrin, vraylar, caplyta, abilify, trazodone  Self Harm: Denies  Suicide Attempts:Denies   Psychosis, Anxiety, Depression: Denies    Past Medical History:  Past Medical History:   Diagnosis Date    ADHD (attention deficit hyperactivity disorder)     Depression     HTN (hypertension)        Substance Abuse History:   Denies    Social History:  Social History     Socioeconomic History    Marital status:    Tobacco Use    Smoking status: Never    Smokeless tobacco: Never   Vaping Use    Vaping status: Never Used   Substance and Sexual Activity    Alcohol use: Never    Drug use: Never    Sexual activity: Defer       Family History:  Family History   Problem Relation Age of Onset    Diabetes Mother     Heart disease Father        Past Surgical History:  Past Surgical History:   Procedure Laterality Date    BARIATRIC SURGERY      BREAST SURGERY       lift    GALLBLADDER SURGERY      STOMACH SURGERY      tummy tuck    TUBAL ABDOMINAL LIGATION         Problem List:  Patient Active Problem List   Diagnosis    Abdominal wall bulge       Allergy:   Allergies   Allergen Reactions    Latex Hives    Bactrim [Sulfamethoxazole-Trimethoprim] Rash        Current Medications:   Current Outpatient Medications   Medication Sig Dispense Refill    amphetamine-dextroamphetamine (Adderall) 15 MG tablet Take 2 tablets by mouth Every Morning AND 1 tablet Daily With Lunch AND 1 tablet Every Afternoon. 120 tablet 0    FLUoxetine (PROzac) 20 MG capsule Take 1 capsule by mouth Daily. 30 capsule 1    FLUoxetine (PROzac) 40 MG capsule Take 1 capsule by mouth Daily. 30 capsule 0    hydrOXYzine (ATARAX) 25 MG tablet Take 1 tablet by mouth 2 (Two) Times a Day As Needed for Anxiety. 60 tablet 0    topiramate (Topamax) 50 MG tablet Take 1 tablet by mouth Daily. 30 tablet 0    amLODIPine (NORVASC) 5 MG tablet       benazepril (LOTENSIN) 10 MG tablet Take 1 tablet by mouth Daily.      cyanocobalamin 1000 MCG/ML injection       Diclofenac Sodium (VOLTAREN) 1 % gel gel diclofenac 1 % topical gel      EQ Aspirin Adult Low Dose 81 MG EC tablet Take 1 tablet by mouth Daily.      esomeprazole (nexIUM) 40 MG capsule Take 1 capsule by mouth Daily.      ferrous sulfate 324 (65 Fe) MG tablet delayed-release EC tablet Take 1 tablet by mouth Daily.      fexofenadine (ALLEGRA) 180 MG tablet Take 1 tablet by mouth Daily.      fluconazole (DIFLUCAN) 150 MG tablet TAKE 1 TABLET BY MOUTH ONCE DAILY FOR 3 DAYS      fluticasone (FLONASE) 50 MCG/ACT nasal spray       furosemide (LASIX) 40 MG tablet Take 1 tablet by mouth Daily.      hydroCHLOROthiazide (HYDRODIURIL) 25 MG tablet Take 25 mg by mouth Daily. (Patient not taking: Reported on 9/14/2023)      HYDROcodone-acetaminophen (NORCO) 7.5-325 MG per tablet Take 1 tablet by mouth Every 8 (Eight) Hours As Needed. for pain      levoFLOXacin (LEVAQUIN) 500 MG  tablet take 1 tablet by mouth every 24 hours (Patient not taking: Reported on 3/8/2024)      levothyroxine (SYNTHROID, LEVOTHROID) 150 MCG tablet Take 1 tablet by mouth Daily.      losartan (COZAAR) 50 MG tablet Take 1 tablet by mouth Daily. (Patient not taking: Reported on 3/8/2024)      methocarbamol (ROBAXIN) 750 MG tablet TAKE 1 TABLET BY MOUTH 4 TIMES DAILY AS NEEDED      nitroglycerin (NITROSTAT) 0.4 MG SL tablet Place 1 tablet under the tongue Every 5 (Five) Minutes As Needed for Chest Pain. do not exceed a total of 3 doses in 15 minutes      ondansetron (ZOFRAN) 8 MG tablet Take 1 mg by mouth Every 8 (Eight) Hours As Needed for Nausea or Vomiting.      ondansetron (Zofran) 8 MG tablet Take 1 tablet by mouth Daily As Needed for Nausea or Vomiting. Take with medications in the morning related to nausea with prescription medications 30 tablet 0    phenazopyridine (PYRIDIUM) 100 MG tablet TAKE 1 TABLET BY MOUTH THREE TIMES DAILY AFTER A MEAL AS NEEDED      potassium chloride 10 MEQ CR tablet TAKE 3 TABLETS BY MOUTH TWICE DAILY WITH FOOD      pregabalin (LYRICA) 50 MG capsule Take 3 capsules by mouth Every 12 (Twelve) Hours.      progesterone (PROMETRIUM) 100 MG capsule Take 100 mg by mouth Daily. (Patient not taking: Reported on 9/14/2023)      venlafaxine (EFFEXOR) 75 MG tablet Take 1 tablet by mouth Daily. 30 tablet 1    vitamin B-12 (CYANOCOBALAMIN) 1000 MCG tablet Take 1 tablet by mouth 1 (One) Time Per Week.      vitamin D (ERGOCALCIFEROL) 1.25 MG (71564 UT) capsule capsule Take 1 capsule by mouth 1 (One) Time Per Week.       No current facility-administered medications for this visit.       Review of Systems:    Review of Systems   Constitutional:  Negative for fatigue.   Respiratory: Negative.     Cardiovascular: Negative.    Neurological:  Negative for headache.   Psychiatric/Behavioral:  Positive for depressed mood and stress. Negative for agitation, behavioral problems, decreased concentration,  dysphoric mood, hallucinations, self-injury, sleep disturbance, suicidal ideas and negative for hyperactivity. The patient is not nervous/anxious.        Physical Exam:   Physical Exam  Constitutional:       Appearance: Normal appearance.   Pulmonary:      Effort: Pulmonary effort is normal.   Neurological:      Mental Status: She is alert and oriented to person, place, and time. Mental status is at baseline.   Psychiatric:         Attention and Perception: Attention and perception normal. She is attentive.         Mood and Affect: Mood is depressed. Affect is flat. Affect is not tearful.         Speech: Speech normal.         Behavior: Behavior normal. Behavior is cooperative.         Thought Content: Thought content normal.         Cognition and Memory: Cognition normal. Memory is not impaired.         Judgment: Judgment normal.       Vitals:  There were no vitals taken for this visit. There is no height or weight on file to calculate BMI.  Due to extenuating circumstances and possible current health risks associated with the patient being present in a clinical setting (with current health restrictions in place in regards to possible COVID 19 transmission/exposure), the patient was seen remotely today via a MyChart Video Visit through [employer].  Unable to obtain vital signs due to nature of remote visit.      Last 3 Blood Pressure Readings:  BP Readings from Last 3 Encounters:   03/08/24 128/79   12/07/23 138/80   11/07/23 138/80       Mental Status Exam:   Hygiene:   good  Cooperation:  Cooperative  Eye Contact:  Good  Psychomotor Behavior:  Appropriate  Affect:  Full range  Mood: normal  Hopelessness: 2  Speech:  Normal  Thought Process:  Linear  Thought Content:  Normal  Suicidal:  None  Homicidal:  None  Hallucinations:  None  Delusion:  None  Memory:  Intact  Orientation:  Person, Place, Time, and Situation  Reliability:  good  Insight:  Good  Judgement:  Fair  Impulse Control:  Fair  Physical/Medical Issues:   Yes see past medical history       Lab Results:   No visits with results within 3 Month(s) from this visit.   Latest known visit with results is:   Office Visit on 09/14/2023   Component Date Value Ref Range Status    External Amphetamine Screen Urine 09/14/2023 Negative   Final    External Benzodiazepine Screen Uri* 09/14/2023 Negative   Final    External Cocaine Screen Urine 09/14/2023 Negative   Final    External THC Screen Urine 09/14/2023 Negative   Final    External Methadone Screen Urine 09/14/2023 Negative   Final    External Methamphetamine Screen Ur* 09/14/2023 Negative   Final    External Oxycodone Screen Urine 09/14/2023 Negative   Final    External Buprenorphine Screen Urine 09/14/2023 Negative   Final    External MDMA 09/14/2023 Negative   Final    External Opiates Screen Urine 09/14/2023 Positive (A)   Final         Assessment & Plan   Diagnoses and all orders for this visit:    1. Generalized anxiety disorder (Primary)    2. Adult ADHD  -     amphetamine-dextroamphetamine (Adderall) 15 MG tablet; Take 2 tablets by mouth Every Morning AND 1 tablet Daily With Lunch AND 1 tablet Every Afternoon.  Dispense: 120 tablet; Refill: 0    3. Excessive daytime sleepiness  -     amphetamine-dextroamphetamine (Adderall) 15 MG tablet; Take 2 tablets by mouth Every Morning AND 1 tablet Daily With Lunch AND 1 tablet Every Afternoon.  Dispense: 120 tablet; Refill: 0    4. Major depressive disorder, recurrent episode, moderate    Other orders  -     venlafaxine (EFFEXOR) 75 MG tablet; Take 1 tablet by mouth Daily.  Dispense: 30 tablet; Refill: 1  -     FLUoxetine (PROzac) 40 MG capsule; Take 1 capsule by mouth Daily.  Dispense: 30 capsule; Refill: 0  -     FLUoxetine (PROzac) 20 MG capsule; Take 1 capsule by mouth Daily.  Dispense: 30 capsule; Refill: 1  -     hydrOXYzine (ATARAX) 25 MG tablet; Take 1 tablet by mouth 2 (Two) Times a Day As Needed for Anxiety.  Dispense: 60 tablet; Refill: 0  -     topiramate (Topamax)  50 MG tablet; Take 1 tablet by mouth Daily.  Dispense: 30 tablet; Refill: 0        Visit Diagnoses:    ICD-10-CM ICD-9-CM   1. Generalized anxiety disorder  F41.1 300.02   2. Adult ADHD  F90.9 314.01   3. Excessive daytime sleepiness  G47.19 780.54   4. Major depressive disorder, recurrent episode, moderate  F33.1 296.32         GOALS:  Short Term Goals: Patient will be compliant with medication, and patient will have no significant medication related side effects.  Patient will be engaged in psychotherapy as indicated.  Patient will report subjective improvement of symptoms.  Long term goals: To stabilize mood and treat/improve subjective symptoms, the patient will stay out of the hospital, the patient will be at an optimal level of functioning, and the patient will take all medications as prescribed.  The patient/guardian verbalized understanding and agreement with goals that were mutually set.      TREATMENT PLAN: Continue supportive psychotherapy efforts and medications as indicated.  Pharmacological and Non-Pharmacological treatment options discussed during today's visit. Patient/Guardian acknowledged and verbally consented with current treatment plan and was educated on the importance of compliance with treatment and follow-up appointments.      MEDICATION ISSUES:  Discussed medication options and treatment plan of prescribed medication as well as the risks, benefits, any black box warnings, and side effects including potential falls, possible impaired driving, and metabolic adversities among others. Patient is agreeable to call the office with any worsening of symptoms or onset of side effects, or if any concerns or questions arise.  The contact information for the office is made available to the patient. Patient is agreeable to call 911 or go to the nearest ER should they begin having any SI/HI, or if any urgent concerns arise. No medication side effects or related complaints today.     MEDS ORDERED DURING  VISIT:  New Medications Ordered This Visit   Medications    venlafaxine (EFFEXOR) 75 MG tablet     Sig: Take 1 tablet by mouth Daily.     Dispense:  30 tablet     Refill:  1    amphetamine-dextroamphetamine (Adderall) 15 MG tablet     Sig: Take 2 tablets by mouth Every Morning AND 1 tablet Daily With Lunch AND 1 tablet Every Afternoon.     Dispense:  120 tablet     Refill:  0    FLUoxetine (PROzac) 40 MG capsule     Sig: Take 1 capsule by mouth Daily.     Dispense:  30 capsule     Refill:  0    FLUoxetine (PROzac) 20 MG capsule     Sig: Take 1 capsule by mouth Daily.     Dispense:  30 capsule     Refill:  1    hydrOXYzine (ATARAX) 25 MG tablet     Sig: Take 1 tablet by mouth 2 (Two) Times a Day As Needed for Anxiety.     Dispense:  60 tablet     Refill:  0    topiramate (Topamax) 50 MG tablet     Sig: Take 1 tablet by mouth Daily.     Dispense:  30 tablet     Refill:  0       - Continue Adderall 15mg take 2 in the morning, 1 at lunch, and 1 in the afternoon.  - Continue Prozac to 60mg daily for depression and anxiety.   -Continue Topamax 50 mg by mouth daily for mood.   -Continue hydroxyzine 25 mg PO PRN BID for anxiety.   -Start Effexor 75 mg by mouth daily for depression/anxiety.  Plan is to eventually taper off of Prozac and increase Effexor as tolerated.  - Jimmy reviewed at this time and is appropriate.  - Discussed with patient possible side effects to include tachycardia, hypertension, cardiac death, anxiety, irritability, and mood fluctuations.  Patient verbalizes understanding and is aware to stop the medication and seek medical attention immediately if she begins to experience any of these symptoms.  - Encourage patient to go to nearest ED if SI/HI develop.      Follow Up Appointment:   Return in about 4 weeks (around 7/4/2024).             This document has been electronically signed by KRISHNA Burgess  June 6, 2024 15:35 EDT    Dictated Utilizing Dragon Dictation: Part of this note may be an  electronic transcription/translation of spoken language to printed text using the Dragon Dictation System.

## 2024-07-05 ENCOUNTER — TELEMEDICINE (OUTPATIENT)
Dept: PSYCHIATRY | Facility: CLINIC | Age: 44
End: 2024-07-05
Payer: COMMERCIAL

## 2024-07-05 DIAGNOSIS — F90.9 ADULT ADHD: ICD-10-CM

## 2024-07-05 DIAGNOSIS — G47.19 EXCESSIVE DAYTIME SLEEPINESS: ICD-10-CM

## 2024-07-05 RX ORDER — TOPIRAMATE 50 MG/1
50 TABLET, FILM COATED ORAL DAILY
Qty: 30 TABLET | Refills: 0 | Status: SHIPPED | OUTPATIENT
Start: 2024-07-05

## 2024-07-05 RX ORDER — FLUOXETINE HYDROCHLORIDE 20 MG/1
20 CAPSULE ORAL DAILY
Qty: 30 CAPSULE | Refills: 0 | Status: SHIPPED | OUTPATIENT
Start: 2024-07-05 | End: 2025-07-05

## 2024-07-05 RX ORDER — HYDROXYZINE HYDROCHLORIDE 25 MG/1
25 TABLET, FILM COATED ORAL 2 TIMES DAILY PRN
Qty: 60 TABLET | Refills: 0 | Status: SHIPPED | OUTPATIENT
Start: 2024-07-05

## 2024-07-05 RX ORDER — BREXPIPRAZOLE 0.5 MG/1
0.5 TABLET ORAL DAILY
Qty: 30 TABLET | Refills: 1 | Status: SHIPPED | OUTPATIENT
Start: 2024-07-05

## 2024-07-05 RX ORDER — DEXTROAMPHETAMINE SACCHARATE, AMPHETAMINE ASPARTATE, DEXTROAMPHETAMINE SULFATE AND AMPHETAMINE SULFATE 3.75; 3.75; 3.75; 3.75 MG/1; MG/1; MG/1; MG/1
TABLET ORAL
Qty: 120 TABLET | Refills: 0 | Status: SHIPPED | OUTPATIENT
Start: 2024-07-05 | End: 2025-07-05

## 2024-07-05 RX ORDER — VENLAFAXINE HYDROCHLORIDE 37.5 MG/1
37.5 CAPSULE, EXTENDED RELEASE ORAL DAILY
Qty: 7 CAPSULE | Refills: 0 | Status: SHIPPED | OUTPATIENT
Start: 2024-07-05 | End: 2025-07-05

## 2024-07-05 RX ORDER — FLUOXETINE HYDROCHLORIDE 40 MG/1
40 CAPSULE ORAL DAILY
Qty: 30 CAPSULE | Refills: 0 | Status: SHIPPED | OUTPATIENT
Start: 2024-07-05 | End: 2025-07-05

## 2024-07-05 NOTE — PROGRESS NOTES
This provider is located at the [facility] (through [facility]), [address] [place] [address], [id number] using a secure [employer] Video Visit through [email or web address]. Patient is being seen remotely via telehealth at their home address in [place], and stated they are in a secure environment for this session. The patient's condition being diagnosed/treated is appropriate for telemedicine. The provider identified herself as well as her credentials.  The patient, and/or patients guardian, consent to be seen remotely, and when consent is given they understand that the consent allows for patient identifiable information to be sent to a third party as needed.   They may refuse to be seen remotely at any time. The electronic data is encrypted and password protected, and the patient and/or guardian has been advised of the potential risks to privacy not withstanding such measures.    You have chosen to receive care through a telehealth visit.  Do you consent to use a video/audio connection for your medical care today? Yes    Patient identifiers utilized: Name and date of birth.    Patient verbally confirmed consent for today's encounter:  July 5, 2024    Subjective   Yuni Garcia is a 44 y.o. female who presents today for follow up    Chief Complaint:  ADHD, depression,    History of Present Illness:    History of Present Illness  Yuni is a 44-year-old female who presents today for a follow-up visit with me.  We had initially planned on tapering off of Prozac and titrating the dose of the Effexor to control her anxiety and depression.  She states that since starting the Effexor, she has been picking at her skin more and sometimes feels more restless.  She states that her  has noticed the increase in picking behaviors and states that she has made sores on her legs from picking. She tells me that she has noticed more depressed mood and isolation.  She tells me that she never wants to get out of the house or do anything fun.   She does not have a lot of motivation or interest and is only able to complete her housework and function at her job when she is taking her Adderall around the clock.  She feels like she had done well on Rexulti in the past but did not like gaining a few pounds so she decided to stop taking it.  She is willing to try this again and hopes to avoid weight gain while improving depressive symptoms.  She states that her appetite has been okay.  She is sleeping well at night but sometimes takes a hydroxyzine when she finds it difficult to sleep.  She reports improved ability to function at home and work with Adderall and denies any cardiovascular side effects.  She denies any SI/HI/AVH.           The following portions of the patient's history were reviewed and updated as appropriate: allergies, current medications, past family history, past medical history, past social history, past surgical history and problem list.    Past Psychiatric History:  Began Treatment: Several years ago.  She has been primarily treated from her primary care provider but has seen two PMHNP's for 1 visit each.  Diagnoses:Depression, Anxiety, and OCD, insomnia  Psychiatrist: Has seen Kandis Sneed and Flower Benoit here in the past.  Therapist:Denies  Admission History:Denies  Medication Trials: zoloft, lexapro, celexa, effexor, cymbalta, prozac, wellbutrin, vraylar, caplyta, abilify, trazodone  Self Harm: Denies  Suicide Attempts:Denies   Psychosis, Anxiety, Depression: Denies    Past Medical History:  Past Medical History:   Diagnosis Date    ADHD (attention deficit hyperactivity disorder)     Depression     HTN (hypertension)        Substance Abuse History:   Denies    Social History:  Social History     Socioeconomic History    Marital status:    Tobacco Use    Smoking status: Never    Smokeless tobacco: Never   Vaping Use    Vaping status: Never Used   Substance and Sexual Activity    Alcohol use: Never    Drug use: Never     Sexual activity: Defer       Family History:  Family History   Problem Relation Age of Onset    Diabetes Mother     Heart disease Father        Past Surgical History:  Past Surgical History:   Procedure Laterality Date    BARIATRIC SURGERY      BREAST SURGERY      lift    GALLBLADDER SURGERY      STOMACH SURGERY      tummy tuck    TUBAL ABDOMINAL LIGATION         Problem List:  Patient Active Problem List   Diagnosis    Abdominal wall bulge       Allergy:   Allergies   Allergen Reactions    Latex Hives    Bactrim [Sulfamethoxazole-Trimethoprim] Rash        Current Medications:   Current Outpatient Medications   Medication Sig Dispense Refill    amphetamine-dextroamphetamine (Adderall) 15 MG tablet Take 2 tablets by mouth Every Morning AND 1 tablet Daily With Lunch AND 1 tablet Every Afternoon. 120 tablet 0    FLUoxetine (PROzac) 20 MG capsule Take 1 capsule by mouth Daily. 30 capsule 0    FLUoxetine (PROzac) 40 MG capsule Take 1 capsule by mouth Daily. 30 capsule 0    hydrOXYzine (ATARAX) 25 MG tablet Take 1 tablet by mouth 2 (Two) Times a Day As Needed for Anxiety. 60 tablet 0    topiramate (Topamax) 50 MG tablet Take 1 tablet by mouth Daily. 30 tablet 0    amLODIPine (NORVASC) 5 MG tablet       benazepril (LOTENSIN) 10 MG tablet Take 1 tablet by mouth Daily.      Brexpiprazole (Rexulti) 0.5 MG tablet Take 0.5 mg by mouth Daily. 30 tablet 1    cyanocobalamin 1000 MCG/ML injection       Diclofenac Sodium (VOLTAREN) 1 % gel gel diclofenac 1 % topical gel      EQ Aspirin Adult Low Dose 81 MG EC tablet Take 1 tablet by mouth Daily.      esomeprazole (nexIUM) 40 MG capsule Take 1 capsule by mouth Daily.      ferrous sulfate 324 (65 Fe) MG tablet delayed-release EC tablet Take 1 tablet by mouth Daily.      fexofenadine (ALLEGRA) 180 MG tablet Take 1 tablet by mouth Daily.      fluconazole (DIFLUCAN) 150 MG tablet TAKE 1 TABLET BY MOUTH ONCE DAILY FOR 3 DAYS      fluticasone (FLONASE) 50 MCG/ACT nasal spray        furosemide (LASIX) 40 MG tablet Take 1 tablet by mouth Daily.      hydroCHLOROthiazide (HYDRODIURIL) 25 MG tablet Take 25 mg by mouth Daily. (Patient not taking: Reported on 9/14/2023)      HYDROcodone-acetaminophen (NORCO) 7.5-325 MG per tablet Take 1 tablet by mouth Every 8 (Eight) Hours As Needed. for pain      levoFLOXacin (LEVAQUIN) 500 MG tablet take 1 tablet by mouth every 24 hours (Patient not taking: Reported on 3/8/2024)      levothyroxine (SYNTHROID, LEVOTHROID) 150 MCG tablet Take 1 tablet by mouth Daily.      losartan (COZAAR) 50 MG tablet Take 1 tablet by mouth Daily. (Patient not taking: Reported on 3/8/2024)      methocarbamol (ROBAXIN) 750 MG tablet TAKE 1 TABLET BY MOUTH 4 TIMES DAILY AS NEEDED      nitroglycerin (NITROSTAT) 0.4 MG SL tablet Place 1 tablet under the tongue Every 5 (Five) Minutes As Needed for Chest Pain. do not exceed a total of 3 doses in 15 minutes      ondansetron (ZOFRAN) 8 MG tablet Take 1 mg by mouth Every 8 (Eight) Hours As Needed for Nausea or Vomiting.      ondansetron (Zofran) 8 MG tablet Take 1 tablet by mouth Daily As Needed for Nausea or Vomiting. Take with medications in the morning related to nausea with prescription medications 30 tablet 0    phenazopyridine (PYRIDIUM) 100 MG tablet TAKE 1 TABLET BY MOUTH THREE TIMES DAILY AFTER A MEAL AS NEEDED      potassium chloride 10 MEQ CR tablet TAKE 3 TABLETS BY MOUTH TWICE DAILY WITH FOOD      pregabalin (LYRICA) 50 MG capsule Take 3 capsules by mouth Every 12 (Twelve) Hours.      progesterone (PROMETRIUM) 100 MG capsule Take 100 mg by mouth Daily. (Patient not taking: Reported on 9/14/2023)      venlafaxine XR (Effexor XR) 37.5 MG 24 hr capsule Take 1 capsule by mouth Daily. 7 capsule 0    vitamin B-12 (CYANOCOBALAMIN) 1000 MCG tablet Take 1 tablet by mouth 1 (One) Time Per Week.      vitamin D (ERGOCALCIFEROL) 1.25 MG (04550 UT) capsule capsule Take 1 capsule by mouth 1 (One) Time Per Week.       No current  facility-administered medications for this visit.       Review of Systems:    Review of Systems   Constitutional:  Negative for fatigue.   Respiratory: Negative.     Cardiovascular: Negative.    Neurological:  Negative for headache.   Psychiatric/Behavioral:  Positive for depressed mood and stress. Negative for agitation, behavioral problems, decreased concentration, dysphoric mood, hallucinations, self-injury, sleep disturbance, suicidal ideas and negative for hyperactivity. The patient is nervous/anxious.        Physical Exam:   Physical Exam  Constitutional:       Appearance: Normal appearance.   Pulmonary:      Effort: Pulmonary effort is normal.   Neurological:      Mental Status: She is alert and oriented to person, place, and time. Mental status is at baseline.   Psychiatric:         Attention and Perception: Attention and perception normal. She is attentive.         Mood and Affect: Mood is depressed. Affect is not flat or tearful.         Speech: Speech normal.         Behavior: Behavior normal. Behavior is cooperative.         Thought Content: Thought content normal.         Cognition and Memory: Cognition normal. Memory is not impaired.         Judgment: Judgment normal.       Vitals:  There were no vitals taken for this visit. There is no height or weight on file to calculate BMI.  Due to extenuating circumstances and possible current health risks associated with the patient being present in a clinical setting (with current health restrictions in place in regards to possible COVID 19 transmission/exposure), the patient was seen remotely today via a MyChart Video Visit through [employer].  Unable to obtain vital signs due to nature of remote visit.      Last 3 Blood Pressure Readings:  BP Readings from Last 3 Encounters:   03/08/24 128/79   12/07/23 138/80   11/07/23 138/80       Mental Status Exam:   Hygiene:   good  Cooperation:  Cooperative  Eye Contact:  Good  Psychomotor Behavior:  Appropriate  Affect:   Full range  Mood: normal  Hopelessness: 2  Speech:  Normal  Thought Process:  Linear  Thought Content:  Normal  Suicidal:  None  Homicidal:  None  Hallucinations:  None  Delusion:  None  Memory:  Intact  Orientation:  Person, Place, Time, and Situation  Reliability:  good  Insight:  Good  Judgement:  Fair  Impulse Control:  Fair  Physical/Medical Issues:  Yes see past medical history       Lab Results:   No visits with results within 3 Month(s) from this visit.   Latest known visit with results is:   Office Visit on 09/14/2023   Component Date Value Ref Range Status    External Amphetamine Screen Urine 09/14/2023 Negative   Final    External Benzodiazepine Screen Uri* 09/14/2023 Negative   Final    External Cocaine Screen Urine 09/14/2023 Negative   Final    External THC Screen Urine 09/14/2023 Negative   Final    External Methadone Screen Urine 09/14/2023 Negative   Final    External Methamphetamine Screen Ur* 09/14/2023 Negative   Final    External Oxycodone Screen Urine 09/14/2023 Negative   Final    External Buprenorphine Screen Urine 09/14/2023 Negative   Final    External MDMA 09/14/2023 Negative   Final    External Opiates Screen Urine 09/14/2023 Positive (A)   Final         Assessment & Plan   Diagnoses and all orders for this visit:    1. Adult ADHD  -     amphetamine-dextroamphetamine (Adderall) 15 MG tablet; Take 2 tablets by mouth Every Morning AND 1 tablet Daily With Lunch AND 1 tablet Every Afternoon.  Dispense: 120 tablet; Refill: 0    2. Excessive daytime sleepiness  -     amphetamine-dextroamphetamine (Adderall) 15 MG tablet; Take 2 tablets by mouth Every Morning AND 1 tablet Daily With Lunch AND 1 tablet Every Afternoon.  Dispense: 120 tablet; Refill: 0    Other orders  -     Brexpiprazole (Rexulti) 0.5 MG tablet; Take 0.5 mg by mouth Daily.  Dispense: 30 tablet; Refill: 1  -     venlafaxine XR (Effexor XR) 37.5 MG 24 hr capsule; Take 1 capsule by mouth Daily.  Dispense: 7 capsule; Refill: 0  -      FLUoxetine (PROzac) 40 MG capsule; Take 1 capsule by mouth Daily.  Dispense: 30 capsule; Refill: 0  -     FLUoxetine (PROzac) 20 MG capsule; Take 1 capsule by mouth Daily.  Dispense: 30 capsule; Refill: 0  -     hydrOXYzine (ATARAX) 25 MG tablet; Take 1 tablet by mouth 2 (Two) Times a Day As Needed for Anxiety.  Dispense: 60 tablet; Refill: 0  -     topiramate (Topamax) 50 MG tablet; Take 1 tablet by mouth Daily.  Dispense: 30 tablet; Refill: 0          Visit Diagnoses:    ICD-10-CM ICD-9-CM   1. Adult ADHD  F90.9 314.01   2. Excessive daytime sleepiness  G47.19 780.54           GOALS:  Short Term Goals: Patient will be compliant with medication, and patient will have no significant medication related side effects.  Patient will be engaged in psychotherapy as indicated.  Patient will report subjective improvement of symptoms.  Long term goals: To stabilize mood and treat/improve subjective symptoms, the patient will stay out of the hospital, the patient will be at an optimal level of functioning, and the patient will take all medications as prescribed.  The patient/guardian verbalized understanding and agreement with goals that were mutually set.      TREATMENT PLAN: Continue supportive psychotherapy efforts and medications as indicated.  Pharmacological and Non-Pharmacological treatment options discussed during today's visit. Patient/Guardian acknowledged and verbally consented with current treatment plan and was educated on the importance of compliance with treatment and follow-up appointments.      MEDICATION ISSUES:  Discussed medication options and treatment plan of prescribed medication as well as the risks, benefits, any black box warnings, and side effects including potential falls, possible impaired driving, and metabolic adversities among others. Patient is agreeable to call the office with any worsening of symptoms or onset of side effects, or if any concerns or questions arise.  The contact information  for the office is made available to the patient. Patient is agreeable to call 911 or go to the nearest ER should they begin having any SI/HI, or if any urgent concerns arise. No medication side effects or related complaints today.     MEDS ORDERED DURING VISIT:  New Medications Ordered This Visit   Medications    Brexpiprazole (Rexulti) 0.5 MG tablet     Sig: Take 0.5 mg by mouth Daily.     Dispense:  30 tablet     Refill:  1    venlafaxine XR (Effexor XR) 37.5 MG 24 hr capsule     Sig: Take 1 capsule by mouth Daily.     Dispense:  7 capsule     Refill:  0    amphetamine-dextroamphetamine (Adderall) 15 MG tablet     Sig: Take 2 tablets by mouth Every Morning AND 1 tablet Daily With Lunch AND 1 tablet Every Afternoon.     Dispense:  120 tablet     Refill:  0    FLUoxetine (PROzac) 40 MG capsule     Sig: Take 1 capsule by mouth Daily.     Dispense:  30 capsule     Refill:  0    FLUoxetine (PROzac) 20 MG capsule     Sig: Take 1 capsule by mouth Daily.     Dispense:  30 capsule     Refill:  0    hydrOXYzine (ATARAX) 25 MG tablet     Sig: Take 1 tablet by mouth 2 (Two) Times a Day As Needed for Anxiety.     Dispense:  60 tablet     Refill:  0    topiramate (Topamax) 50 MG tablet     Sig: Take 1 tablet by mouth Daily.     Dispense:  30 tablet     Refill:  0       - Continue Adderall 15mg take 2 in the morning, 1 at lunch, and 1 in the afternoon.  - Continue Prozac to 60mg daily for depression and anxiety.   -Continue Topamax 50 mg by mouth daily for mood.   -Continue hydroxyzine 25 mg PO PRN BID for anxiety.   - Discontinue Effexor 75 mg by mouth daily for depression/anxiety. Feels like this has made her more nervous and picking at her skin.   -Patient reports continued depressive symptoms with high dose of Prozac.  Will add Rexulti 0.5 mg by mouth daily for adjunctive treatment for depression.   - Jimmy reviewed at this time and is appropriate.  - Discussed with patient possible side effects to include tachycardia,  hypertension, cardiac death, anxiety, irritability, and mood fluctuations.  Patient verbalizes understanding and is aware to stop the medication and seek medical attention immediately if she begins to experience any of these symptoms.  - Encourage patient to go to nearest ED if SI/HI develop.      Follow Up Appointment:   Return in about 4 weeks (around 8/2/2024) for Recheck, Video visit.             This document has been electronically signed by KRISHNA Burgess  July 5, 2024 13:50 EDT    Dictated Utilizing Dragon Dictation: Part of this note may be an electronic transcription/translation of spoken language to printed text using the Dragon Dictation System.

## 2024-08-06 ENCOUNTER — TELEMEDICINE (OUTPATIENT)
Dept: PSYCHIATRY | Facility: CLINIC | Age: 44
End: 2024-08-06
Payer: COMMERCIAL

## 2024-08-06 DIAGNOSIS — G47.19 EXCESSIVE DAYTIME SLEEPINESS: ICD-10-CM

## 2024-08-06 DIAGNOSIS — F33.1 MAJOR DEPRESSIVE DISORDER, RECURRENT EPISODE, MODERATE: ICD-10-CM

## 2024-08-06 DIAGNOSIS — F41.1 GENERALIZED ANXIETY DISORDER: Primary | ICD-10-CM

## 2024-08-06 DIAGNOSIS — F90.9 ADULT ADHD: ICD-10-CM

## 2024-08-06 PROCEDURE — 1159F MED LIST DOCD IN RCRD: CPT

## 2024-08-06 PROCEDURE — 99214 OFFICE O/P EST MOD 30 MIN: CPT

## 2024-08-06 PROCEDURE — 1160F RVW MEDS BY RX/DR IN RCRD: CPT

## 2024-08-06 RX ORDER — TOPIRAMATE 100 MG/1
100 TABLET, FILM COATED ORAL DAILY
Qty: 30 TABLET | Refills: 1 | Status: SHIPPED | OUTPATIENT
Start: 2024-08-06

## 2024-08-06 RX ORDER — VENLAFAXINE HYDROCHLORIDE 150 MG/1
150 CAPSULE, EXTENDED RELEASE ORAL DAILY
Qty: 30 CAPSULE | Refills: 1 | Status: SHIPPED | OUTPATIENT
Start: 2024-08-06

## 2024-08-06 RX ORDER — HYDROXYZINE HYDROCHLORIDE 25 MG/1
25 TABLET, FILM COATED ORAL 2 TIMES DAILY PRN
Qty: 60 TABLET | Refills: 0 | Status: SHIPPED | OUTPATIENT
Start: 2024-08-06

## 2024-08-06 RX ORDER — DEXTROAMPHETAMINE SACCHARATE, AMPHETAMINE ASPARTATE, DEXTROAMPHETAMINE SULFATE AND AMPHETAMINE SULFATE 3.75; 3.75; 3.75; 3.75 MG/1; MG/1; MG/1; MG/1
TABLET ORAL
Qty: 120 TABLET | Refills: 0 | Status: SHIPPED | OUTPATIENT
Start: 2024-08-06 | End: 2025-08-06

## 2024-08-06 NOTE — PROGRESS NOTES
"This provider is located at the [facility] (through [facility]), [address] [place] [address], [id number] using a secure [employer] Video Visit through [employer]. Patient is being seen remotely via telehealth at their home address in [place], and stated they are in a secure environment for this session. The patient's condition being diagnosed/treated is appropriate for telemedicine. The provider identified herself as well as her credentials.  The patient, and/or patients guardian, consent to be seen remotely, and when consent is given they understand that the consent allows for patient identifiable information to be sent to a third party as needed.   They may refuse to be seen remotely at any time. The electronic data is encrypted and password protected, and the patient and/or guardian has been advised of the potential risks to privacy not withstanding such measures.    You have chosen to receive care through a telehealth visit.  Do you consent to use a video/audio connection for your medical care today? Yes    Patient identifiers utilized: Name and date of birth.    Patient verbally confirmed consent for today's encounter:  August 6, 2024    Subjective   Yuni Garcia is a 44 y.o. female who presents today for follow up    Chief Complaint:  ADHD, depression,    History of Present Illness:    History of Present Illness  Yuni is a 44-year-old female who presents today for a follow-up visit with me.  Tells me that things have been going okay and has had a consultation with her gynecologist to discuss having a hysterectomy.  She states that her hormone levels were \"all over the place.\"  She states that she plans on having a hysterectomy sometime soon but was advised by her OB/GYN to find a medication to help with mood issues first.  She tells me that she was picking at her skin more with the Prozac so she decided to quit taking it approximately 3 weeks ago.  She is taking hydroxyzine twice daily.  She states that her " OB/GYN started her on Effexor 75 mg and feels like it has been working fairly well this time in conjunction with the Rexulti.  He states that after she quit taking the Prozac, she did notice that she was crying a lot more.  She tells me that she has cut back to working 2 12-hour shifts per week due to her mood and stress level.  She states that she has been eating more the past few weeks. She reports improved ability to function at home and work with Adderall and denies any cardiovascular side effects.  She denies any SI/HI/AVH.       The following portions of the patient's history were reviewed and updated as appropriate: allergies, current medications, past family history, past medical history, past social history, past surgical history and problem list.    Past Psychiatric History:  Began Treatment: Several years ago.  She has been primarily treated from her primary care provider but has seen two PMHNP's for 1 visit each.  Diagnoses:Depression, Anxiety, and OCD, insomnia  Psychiatrist: Has seen Kandis Sneed and Flower Benoit here in the past.  Therapist:Denies  Admission History:Denies  Medication Trials: zoloft, lexapro, celexa, effexor, cymbalta, prozac, wellbutrin, vraylar, caplyta, abilify, trazodone  Self Harm: Denies  Suicide Attempts:Denies   Psychosis, Anxiety, Depression: Denies    Past Medical History:  Past Medical History:   Diagnosis Date    ADHD (attention deficit hyperactivity disorder)     Depression     HTN (hypertension)        Substance Abuse History:   Denies    Social History:  Social History     Socioeconomic History    Marital status:    Tobacco Use    Smoking status: Never    Smokeless tobacco: Never   Vaping Use    Vaping status: Never Used   Substance and Sexual Activity    Alcohol use: Never    Drug use: Never    Sexual activity: Defer       Family History:  Family History   Problem Relation Age of Onset    Diabetes Mother     Heart disease Father        Past Surgical  History:  Past Surgical History:   Procedure Laterality Date    BARIATRIC SURGERY      BREAST SURGERY      lift    GALLBLADDER SURGERY      STOMACH SURGERY      tummy tuck    TUBAL ABDOMINAL LIGATION         Problem List:  Patient Active Problem List   Diagnosis    Abdominal wall bulge       Allergy:   Allergies   Allergen Reactions    Latex Hives    Bactrim [Sulfamethoxazole-Trimethoprim] Rash        Current Medications:   Current Outpatient Medications   Medication Sig Dispense Refill    amphetamine-dextroamphetamine (Adderall) 15 MG tablet Take 2 tablets by mouth Every Morning AND 1 tablet Daily With Lunch AND 1 tablet Every Afternoon. 120 tablet 0    hydrOXYzine (ATARAX) 25 MG tablet Take 1 tablet by mouth 2 (Two) Times a Day As Needed for Anxiety. 60 tablet 0    topiramate (Topamax) 100 MG tablet Take 1 tablet by mouth Daily. 30 tablet 1    amLODIPine (NORVASC) 5 MG tablet       benazepril (LOTENSIN) 10 MG tablet Take 1 tablet by mouth Daily.      Brexpiprazole 2 MG tablet Take 1 tablet by mouth Daily. 30 tablet 1    cyanocobalamin 1000 MCG/ML injection       Diclofenac Sodium (VOLTAREN) 1 % gel gel diclofenac 1 % topical gel      EQ Aspirin Adult Low Dose 81 MG EC tablet Take 1 tablet by mouth Daily.      esomeprazole (nexIUM) 40 MG capsule Take 1 capsule by mouth Daily.      ferrous sulfate 324 (65 Fe) MG tablet delayed-release EC tablet Take 1 tablet by mouth Daily.      fexofenadine (ALLEGRA) 180 MG tablet Take 1 tablet by mouth Daily.      fluconazole (DIFLUCAN) 150 MG tablet TAKE 1 TABLET BY MOUTH ONCE DAILY FOR 3 DAYS      fluticasone (FLONASE) 50 MCG/ACT nasal spray       furosemide (LASIX) 40 MG tablet Take 1 tablet by mouth Daily.      HYDROcodone-acetaminophen (NORCO) 7.5-325 MG per tablet Take 1 tablet by mouth Every 8 (Eight) Hours As Needed. for pain      levothyroxine (SYNTHROID, LEVOTHROID) 150 MCG tablet Take 1 tablet by mouth Daily.      methocarbamol (ROBAXIN) 750 MG tablet TAKE 1 TABLET BY  MOUTH 4 TIMES DAILY AS NEEDED      nitroglycerin (NITROSTAT) 0.4 MG SL tablet Place 1 tablet under the tongue Every 5 (Five) Minutes As Needed for Chest Pain. do not exceed a total of 3 doses in 15 minutes      ondansetron (ZOFRAN) 8 MG tablet Take 1 mg by mouth Every 8 (Eight) Hours As Needed for Nausea or Vomiting.      ondansetron (Zofran) 8 MG tablet Take 1 tablet by mouth Daily As Needed for Nausea or Vomiting. Take with medications in the morning related to nausea with prescription medications 30 tablet 0    phenazopyridine (PYRIDIUM) 100 MG tablet TAKE 1 TABLET BY MOUTH THREE TIMES DAILY AFTER A MEAL AS NEEDED      potassium chloride 10 MEQ CR tablet TAKE 3 TABLETS BY MOUTH TWICE DAILY WITH FOOD      pregabalin (LYRICA) 50 MG capsule Take 3 capsules by mouth Every 12 (Twelve) Hours.      venlafaxine XR (Effexor XR) 150 MG 24 hr capsule Take 1 capsule by mouth Daily. 30 capsule 1    vitamin B-12 (CYANOCOBALAMIN) 1000 MCG tablet Take 1 tablet by mouth 1 (One) Time Per Week.      vitamin D (ERGOCALCIFEROL) 1.25 MG (57954 UT) capsule capsule Take 1 capsule by mouth 1 (One) Time Per Week.       No current facility-administered medications for this visit.       Review of Systems:    Review of Systems   Constitutional:  Positive for unexpected weight gain. Negative for fatigue.   Respiratory: Negative.     Cardiovascular: Negative.    Neurological:  Negative for headache.   Psychiatric/Behavioral:  Positive for depressed mood and stress. Negative for agitation, behavioral problems, decreased concentration, dysphoric mood, hallucinations, self-injury, sleep disturbance, suicidal ideas and negative for hyperactivity. The patient is nervous/anxious.        Physical Exam:   Physical Exam  Constitutional:       Appearance: Normal appearance.   Pulmonary:      Effort: Pulmonary effort is normal.   Neurological:      Mental Status: She is alert and oriented to person, place, and time. Mental status is at baseline.    Psychiatric:         Attention and Perception: Attention and perception normal. She is attentive.         Mood and Affect: Mood is depressed. Affect is not flat or tearful.         Speech: Speech normal.         Behavior: Behavior normal. Behavior is cooperative.         Thought Content: Thought content normal.         Cognition and Memory: Cognition normal. Memory is not impaired.         Judgment: Judgment normal.       Vitals:  There were no vitals taken for this visit. There is no height or weight on file to calculate BMI.  Due to extenuating circumstances and possible current health risks associated with the patient being present in a clinical setting (with current health restrictions in place in regards to possible COVID 19 transmission/exposure), the patient was seen remotely today via a [employer] Video Visit through [employer].  Unable to obtain vital signs due to nature of remote visit.      Last 3 Blood Pressure Readings:  BP Readings from Last 3 Encounters:   03/08/24 128/79   12/07/23 138/80   11/07/23 138/80       Mental Status Exam:   Hygiene:   good  Cooperation:  Cooperative  Eye Contact:  Good  Psychomotor Behavior:  Appropriate  Affect:  Full range  Mood: normal  Hopelessness: 2  Speech:  Normal  Thought Process:  Linear  Thought Content:  Normal  Suicidal:  None  Homicidal:  None  Hallucinations:  None  Delusion:  None  Memory:  Intact  Orientation:  Person, Place, Time, and Situation  Reliability:  good  Insight:  Good  Judgement:  Fair  Impulse Control:  Fair  Physical/Medical Issues:  Yes see past medical history       Lab Results:   No visits with results within 3 Month(s) from this visit.   Latest known visit with results is:   Office Visit on 09/14/2023   Component Date Value Ref Range Status    External Amphetamine Screen Urine 09/14/2023 Negative   Final    External Benzodiazepine Screen Uri* 09/14/2023 Negative   Final    External Cocaine Screen Urine 09/14/2023 Negative   Final    External THC  Screen Urine 09/14/2023 Negative   Final    External Methadone Screen Urine 09/14/2023 Negative   Final    External Methamphetamine Screen Ur* 09/14/2023 Negative   Final    External Oxycodone Screen Urine 09/14/2023 Negative   Final    External Buprenorphine Screen Urine 09/14/2023 Negative   Final    External MDMA 09/14/2023 Negative   Final    External Opiates Screen Urine 09/14/2023 Positive (A)   Final         Assessment & Plan   Diagnoses and all orders for this visit:    1. Generalized anxiety disorder (Primary)  -     topiramate (Topamax) 100 MG tablet; Take 1 tablet by mouth Daily.  Dispense: 30 tablet; Refill: 1  -     hydrOXYzine (ATARAX) 25 MG tablet; Take 1 tablet by mouth 2 (Two) Times a Day As Needed for Anxiety.  Dispense: 60 tablet; Refill: 0  -     Brexpiprazole 2 MG tablet; Take 1 tablet by mouth Daily.  Dispense: 30 tablet; Refill: 1  -     venlafaxine XR (Effexor XR) 150 MG 24 hr capsule; Take 1 capsule by mouth Daily.  Dispense: 30 capsule; Refill: 1    2. Adult ADHD  -     amphetamine-dextroamphetamine (Adderall) 15 MG tablet; Take 2 tablets by mouth Every Morning AND 1 tablet Daily With Lunch AND 1 tablet Every Afternoon.  Dispense: 120 tablet; Refill: 0    3. Excessive daytime sleepiness  -     amphetamine-dextroamphetamine (Adderall) 15 MG tablet; Take 2 tablets by mouth Every Morning AND 1 tablet Daily With Lunch AND 1 tablet Every Afternoon.  Dispense: 120 tablet; Refill: 0    4. Major depressive disorder, recurrent episode, moderate  -     topiramate (Topamax) 100 MG tablet; Take 1 tablet by mouth Daily.  Dispense: 30 tablet; Refill: 1  -     Brexpiprazole 2 MG tablet; Take 1 tablet by mouth Daily.  Dispense: 30 tablet; Refill: 1  -     venlafaxine XR (Effexor XR) 150 MG 24 hr capsule; Take 1 capsule by mouth Daily.  Dispense: 30 capsule; Refill: 1          Visit Diagnoses:    ICD-10-CM ICD-9-CM   1. Generalized anxiety disorder  F41.1 300.02   2. Adult ADHD  F90.9 314.01   3. Excessive  daytime sleepiness  G47.19 780.54   4. Major depressive disorder, recurrent episode, moderate  F33.1 296.32       GOALS:  Short Term Goals: Patient will be compliant with medication, and patient will have no significant medication related side effects.  Patient will be engaged in psychotherapy as indicated.  Patient will report subjective improvement of symptoms.  Long term goals: To stabilize mood and treat/improve subjective symptoms, the patient will stay out of the hospital, the patient will be at an optimal level of functioning, and the patient will take all medications as prescribed.  The patient/guardian verbalized understanding and agreement with goals that were mutually set.      TREATMENT PLAN: Continue supportive psychotherapy efforts and medications as indicated.  Pharmacological and Non-Pharmacological treatment options discussed during today's visit. Patient/Guardian acknowledged and verbally consented with current treatment plan and was educated on the importance of compliance with treatment and follow-up appointments.      MEDICATION ISSUES:  Discussed medication options and treatment plan of prescribed medication as well as the risks, benefits, any black box warnings, and side effects including potential falls, possible impaired driving, and metabolic adversities among others. Patient is agreeable to call the office with any worsening of symptoms or onset of side effects, or if any concerns or questions arise.  The contact information for the office is made available to the patient. Patient is agreeable to call 911 or go to the nearest ER should they begin having any SI/HI, or if any urgent concerns arise. No medication side effects or related complaints today.     MEDS ORDERED DURING VISIT:  New Medications Ordered This Visit   Medications    topiramate (Topamax) 100 MG tablet     Sig: Take 1 tablet by mouth Daily.     Dispense:  30 tablet     Refill:  1    hydrOXYzine (ATARAX) 25 MG tablet      Sig: Take 1 tablet by mouth 2 (Two) Times a Day As Needed for Anxiety.     Dispense:  60 tablet     Refill:  0    Brexpiprazole 2 MG tablet     Sig: Take 1 tablet by mouth Daily.     Dispense:  30 tablet     Refill:  1    venlafaxine XR (Effexor XR) 150 MG 24 hr capsule     Sig: Take 1 capsule by mouth Daily.     Dispense:  30 capsule     Refill:  1    amphetamine-dextroamphetamine (Adderall) 15 MG tablet     Sig: Take 2 tablets by mouth Every Morning AND 1 tablet Daily With Lunch AND 1 tablet Every Afternoon.     Dispense:  120 tablet     Refill:  0       - Continue Adderall 15mg take 2 in the morning, 1 at lunch, and 1 in the afternoon.  - Prozac was stopped by patient between visits.  - Increase Topamax to 100 mg by mouth daily for mood.   --Continue hydroxyzine 25 mg PO PRN BID for anxiety.   -  Effexor 75 mg by mouth daily was restarted by her OBGYN, Dr. Gunter in [place].   -Patient reports continued depressive symptoms with high dose of Prozac.  Will add Rexulti 0.5 mg by mouth daily for adjunctive treatment for depression.   - Jimmy reviewed at this time and is appropriate.  - Discussed with patient possible side effects to include tachycardia, hypertension, cardiac death, anxiety, irritability, and mood fluctuations.  Patient verbalizes understanding and is aware to stop the medication and seek medical attention immediately if she begins to experience any of these symptoms.  - Encourage patient to go to nearest ED if SI/HI develop.      Follow Up Appointment:   Return in about 4 weeks (around 9/3/2024) for Recheck.             This document has been electronically signed by KRISHNA Burgess  August 6, 2024 15:27 EDT    Dictated Utilizing Dragon Dictation: Part of this note may be an electronic transcription/translation of spoken language to printed text using the Dragon Dictation System.

## 2024-08-08 ENCOUNTER — TELEPHONE (OUTPATIENT)
Dept: PSYCHIATRY | Facility: CLINIC | Age: 44
End: 2024-08-08
Payer: COMMERCIAL

## 2024-08-08 NOTE — TELEPHONE ENCOUNTER
Patient called states she has sleep apnea states she is not wanting to take Effexor due to it making her more sleepy. Requesting to go back on vraylar

## 2024-08-09 NOTE — TELEPHONE ENCOUNTER
Called and informed patient she stated after thinking about it she felt the same maybe she just needed to give it a little more time to balance out. States she will see how it goes and call back in a few weeks if it doesn't get better.

## 2024-08-09 NOTE — TELEPHONE ENCOUNTER
She is taking rexulti which works in the same way as Vraylar, she may need more time on Rexulti to notice much of a difference with mood. If she is too tired on Effexor, we can go back to the 75mg dose.

## 2024-08-12 ENCOUNTER — TELEPHONE (OUTPATIENT)
Dept: PSYCHIATRY | Facility: CLINIC | Age: 44
End: 2024-08-12
Payer: COMMERCIAL

## 2024-08-12 DIAGNOSIS — F33.1 MAJOR DEPRESSIVE DISORDER, RECURRENT EPISODE, MODERATE: ICD-10-CM

## 2024-08-12 DIAGNOSIS — F41.1 GENERALIZED ANXIETY DISORDER: ICD-10-CM

## 2024-08-12 NOTE — TELEPHONE ENCOUNTER
Dr Yen's patient called stated she went to  Rexulti 0.5 mg prescription but pharmacy stated it had been canceled. Last visit states patient would continue medication. Can you please review and see if this can be sent in

## 2024-09-03 ENCOUNTER — PRIOR AUTHORIZATION (OUTPATIENT)
Dept: PSYCHIATRY | Facility: CLINIC | Age: 44
End: 2024-09-03
Payer: COMMERCIAL

## 2024-09-03 NOTE — TELEPHONE ENCOUNTER
The request has been approved. The authorization is effective from 09/03/2024 to 09/02/2025, as long as the member is enrolled in their current health plan. The request was approved as submitted. A written notification letter will follow with additional details.. Authorization Expiration Date: September 2, 2025

## 2024-09-04 ENCOUNTER — TELEMEDICINE (OUTPATIENT)
Dept: PSYCHIATRY | Facility: CLINIC | Age: 44
End: 2024-09-04
Payer: COMMERCIAL

## 2024-09-04 DIAGNOSIS — F33.1 MAJOR DEPRESSIVE DISORDER, RECURRENT EPISODE, MODERATE: ICD-10-CM

## 2024-09-04 DIAGNOSIS — F41.1 GENERALIZED ANXIETY DISORDER: ICD-10-CM

## 2024-09-04 DIAGNOSIS — F90.9 ADULT ADHD: Primary | ICD-10-CM

## 2024-09-04 PROCEDURE — 99214 OFFICE O/P EST MOD 30 MIN: CPT

## 2024-09-04 PROCEDURE — 1159F MED LIST DOCD IN RCRD: CPT

## 2024-09-04 PROCEDURE — 1160F RVW MEDS BY RX/DR IN RCRD: CPT

## 2024-09-04 RX ORDER — TOPIRAMATE 100 MG/1
100 TABLET, FILM COATED ORAL 2 TIMES DAILY
Qty: 60 TABLET | Refills: 1 | Status: SHIPPED | OUTPATIENT
Start: 2024-09-04

## 2024-09-04 RX ORDER — DEXTROAMPHETAMINE SACCHARATE, AMPHETAMINE ASPARTATE MONOHYDRATE, DEXTROAMPHETAMINE SULFATE, AMPHETAMINE SULFATE 12.5; 12.5; 12.5; 12.5 MG/1; MG/1; MG/1; MG/1
50 CAPSULE, EXTENDED RELEASE ORAL EVERY MORNING
Qty: 7 CAPSULE | Refills: 0 | Status: SHIPPED | OUTPATIENT
Start: 2024-09-04

## 2024-09-04 NOTE — PROGRESS NOTES
This provider is located at the [facility] (through [facility]), [address] [place] [address], [id number] using a secure [employer] Video Visit through [employer]. Patient is being seen remotely via telehealth at their home address in [place], and stated they are in a secure environment for this session. The patient's condition being diagnosed/treated is appropriate for telemedicine. The provider identified herself as well as her credentials.  The patient, and/or patients guardian, consent to be seen remotely, and when consent is given they understand that the consent allows for patient identifiable information to be sent to a third party as needed.   They may refuse to be seen remotely at any time. The electronic data is encrypted and password protected, and the patient and/or guardian has been advised of the potential risks to privacy not withstanding such measures.    You have chosen to receive care through a telehealth visit.  Do you consent to use a video/audio connection for your medical care today? Yes    Patient identifiers utilized: Name and date of birth.    Patient verbally confirmed consent for today's encounter:  September 4, 2024    Subjective   Yuni Garcia is a 44 y.o. female who presents today for follow up    Chief Complaint:  ADHD, depression,    History of Present Illness:    History of Present Illness  Yuni is a 44-year-old female who presents today for a follow-up visit with me.  She had previously requested to decrease dose or discontinue taking Effexor due to sedation but has remained on the 75 mg dose.  She is also taking Rexulti 2 mg daily and Topamax 100 mg daily which she has tolerated well.  She does feel like her mood fluctuates.  She has been stress eating a lot and reports being under a tremendous amount of stress at work.  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  She reports having difficulty remembering and  struggles with taking Adderall 3 times per day.  She states that she would like to go back to Adderall XR or something like that.  She has previously tried Concerta, Vyvanse, and Adderall XR.  Patient is willing to do a trial of Mydayis over the next week to see if she notices benefits with more longevity.  Anxiety has not been bad but she has been a little more depressed and states that it is nearing the anniversary of her father's death.  She states that she always worries about her mom as well since she has been in poor health.  States that she has been sleeping good.  Denies any SI/HI/AVH.  Denies any side effects of the medications.  Denies any chest pain or palpitations.       The following portions of the patient's history were reviewed and updated as appropriate: allergies, current medications, past family history, past medical history, past social history, past surgical history and problem list.    Past Psychiatric History:  Began Treatment: Several years ago.  She has been primarily treated from her primary care provider but has seen two PMHNP's for 1 visit each.  Diagnoses:Depression, Anxiety, and OCD, insomnia  Psychiatrist: Has seen Kandis Sneed and Flower Benoit here in the past.  Therapist:Denies  Admission History:Denies  Medication Trials: zoloft, lexapro, celexa, effexor, cymbalta, prozac, wellbutrin, vraylar, caplyta, abilify, trazodone, Concerta, Vyvanse, Adderall, Adderall XR  Self Harm: Denies  Suicide Attempts:Denies   Psychosis, Anxiety, Depression: Denies    Past Medical History:  Past Medical History:   Diagnosis Date    ADHD (attention deficit hyperactivity disorder)     Depression     HTN (hypertension)        Substance Abuse History:   Denies    Social History:  Social History     Socioeconomic History    Marital status:    Tobacco Use    Smoking status: Never    Smokeless tobacco: Never   Vaping Use    Vaping status: Never Used   Substance and Sexual Activity    Alcohol  use: Never    Drug use: Never    Sexual activity: Defer       Family History:  Family History   Problem Relation Age of Onset    Diabetes Mother     Heart disease Father        Past Surgical History:  Past Surgical History:   Procedure Laterality Date    BARIATRIC SURGERY      BREAST SURGERY      lift    GALLBLADDER SURGERY      STOMACH SURGERY      tummy tuck    TUBAL ABDOMINAL LIGATION         Problem List:  Patient Active Problem List   Diagnosis    Abdominal wall bulge       Allergy:   Allergies   Allergen Reactions    Latex Hives    Bactrim [Sulfamethoxazole-Trimethoprim] Rash        Current Medications:   Current Outpatient Medications   Medication Sig Dispense Refill    topiramate (Topamax) 100 MG tablet Take 1 tablet by mouth 2 (Two) Times a Day. 60 tablet 1    amLODIPine (NORVASC) 5 MG tablet       Amphet-Dextroamphet 3-Bead ER 50 MG capsule sustained-release 24 hr Take 1 capsule by mouth Every Morning. 7 capsule 0    benazepril (LOTENSIN) 10 MG tablet Take 1 tablet by mouth Daily.      Brexpiprazole 2 MG tablet Take 1 tablet by mouth Daily. 30 tablet 1    cyanocobalamin 1000 MCG/ML injection       Diclofenac Sodium (VOLTAREN) 1 % gel gel diclofenac 1 % topical gel      EQ Aspirin Adult Low Dose 81 MG EC tablet Take 1 tablet by mouth Daily.      esomeprazole (nexIUM) 40 MG capsule Take 1 capsule by mouth Daily.      ferrous sulfate 324 (65 Fe) MG tablet delayed-release EC tablet Take 1 tablet by mouth Daily.      fexofenadine (ALLEGRA) 180 MG tablet Take 1 tablet by mouth Daily.      fluconazole (DIFLUCAN) 150 MG tablet TAKE 1 TABLET BY MOUTH ONCE DAILY FOR 3 DAYS      fluticasone (FLONASE) 50 MCG/ACT nasal spray       furosemide (LASIX) 40 MG tablet Take 1 tablet by mouth Daily.      HYDROcodone-acetaminophen (NORCO) 7.5-325 MG per tablet Take 1 tablet by mouth Every 8 (Eight) Hours As Needed. for pain      hydrOXYzine (ATARAX) 25 MG tablet Take 1 tablet by mouth 2 (Two) Times a Day As Needed for Anxiety.  60 tablet 0    levothyroxine (SYNTHROID, LEVOTHROID) 150 MCG tablet Take 1 tablet by mouth Daily.      methocarbamol (ROBAXIN) 750 MG tablet TAKE 1 TABLET BY MOUTH 4 TIMES DAILY AS NEEDED      nitroglycerin (NITROSTAT) 0.4 MG SL tablet Place 1 tablet under the tongue Every 5 (Five) Minutes As Needed for Chest Pain. do not exceed a total of 3 doses in 15 minutes      ondansetron (ZOFRAN) 8 MG tablet Take 1 mg by mouth Every 8 (Eight) Hours As Needed for Nausea or Vomiting.      ondansetron (Zofran) 8 MG tablet Take 1 tablet by mouth Daily As Needed for Nausea or Vomiting. Take with medications in the morning related to nausea with prescription medications 30 tablet 0    phenazopyridine (PYRIDIUM) 100 MG tablet TAKE 1 TABLET BY MOUTH THREE TIMES DAILY AFTER A MEAL AS NEEDED      potassium chloride 10 MEQ CR tablet TAKE 3 TABLETS BY MOUTH TWICE DAILY WITH FOOD      pregabalin (LYRICA) 50 MG capsule Take 3 capsules by mouth Every 12 (Twelve) Hours.      venlafaxine XR (Effexor XR) 150 MG 24 hr capsule Take 1 capsule by mouth Daily. 30 capsule 1    vitamin B-12 (CYANOCOBALAMIN) 1000 MCG tablet Take 1 tablet by mouth 1 (One) Time Per Week.      vitamin D (ERGOCALCIFEROL) 1.25 MG (01081 UT) capsule capsule Take 1 capsule by mouth 1 (One) Time Per Week.       No current facility-administered medications for this visit.       Review of Systems:    Review of Systems   Constitutional:  Positive for unexpected weight gain. Negative for fatigue.   Respiratory: Negative.     Cardiovascular: Negative.    Neurological:  Negative for headache.   Psychiatric/Behavioral:  Positive for decreased concentration, depressed mood and stress. Negative for agitation, behavioral problems, dysphoric mood, hallucinations, self-injury, sleep disturbance, suicidal ideas and negative for hyperactivity. The patient is not nervous/anxious.        Physical Exam:   Physical Exam  Constitutional:       Appearance: Normal appearance.   Pulmonary:       Effort: Pulmonary effort is normal.   Neurological:      Mental Status: She is alert and oriented to person, place, and time. Mental status is at baseline.   Psychiatric:         Attention and Perception: Attention and perception normal. She is attentive.         Mood and Affect: Mood is not depressed. Affect is not flat or tearful.         Speech: Speech normal.         Behavior: Behavior normal. Behavior is cooperative.         Thought Content: Thought content normal.         Cognition and Memory: Cognition normal. Memory is impaired.         Judgment: Judgment normal.       Vitals:  There were no vitals taken for this visit. There is no height or weight on file to calculate BMI.  Due to extenuating circumstances and possible current health risks associated with the patient being present in a clinical setting (with current health restrictions in place in regards to possible COVID 19 transmission/exposure), the patient was seen remotely today via a MyChart Video Visit through [employer].  Unable to obtain vital signs due to nature of remote visit.      Last 3 Blood Pressure Readings:  BP Readings from Last 3 Encounters:   03/08/24 128/79   12/07/23 138/80   11/07/23 138/80       Mental Status Exam:   Hygiene:   good  Cooperation:  Cooperative  Eye Contact:  Good  Psychomotor Behavior:  Appropriate  Affect:  Full range  Mood: normal  Hopelessness: 0  Speech:  Normal  Thought Process:  Linear  Thought Content:  Normal  Suicidal:  None  Homicidal:  None  Hallucinations:  None  Delusion:  None  Memory:  Intact  Orientation:  Person, Place, Time, and Situation  Reliability:  good  Insight:  Good  Judgement:  Fair  Impulse Control:  Fair  Physical/Medical Issues:  Yes see past medical history       Lab Results:   No visits with results within 3 Month(s) from this visit.   Latest known visit with results is:   Office Visit on 09/14/2023   Component Date Value Ref Range Status    External Amphetamine Screen Urine 09/14/2023  Negative   Final    External Benzodiazepine Screen Uri* 09/14/2023 Negative   Final    External Cocaine Screen Urine 09/14/2023 Negative   Final    External THC Screen Urine 09/14/2023 Negative   Final    External Methadone Screen Urine 09/14/2023 Negative   Final    External Methamphetamine Screen Ur* 09/14/2023 Negative   Final    External Oxycodone Screen Urine 09/14/2023 Negative   Final    External Buprenorphine Screen Urine 09/14/2023 Negative   Final    External MDMA 09/14/2023 Negative   Final    External Opiates Screen Urine 09/14/2023 Positive (A)   Final         Assessment & Plan   Diagnoses and all orders for this visit:    1. Adult ADHD (Primary)  -     Amphet-Dextroamphet 3-Bead ER 50 MG capsule sustained-release 24 hr; Take 1 capsule by mouth Every Morning.  Dispense: 7 capsule; Refill: 0    2. Generalized anxiety disorder  -     topiramate (Topamax) 100 MG tablet; Take 1 tablet by mouth 2 (Two) Times a Day.  Dispense: 60 tablet; Refill: 1    3. Major depressive disorder, recurrent episode, moderate  -     topiramate (Topamax) 100 MG tablet; Take 1 tablet by mouth 2 (Two) Times a Day.  Dispense: 60 tablet; Refill: 1            Visit Diagnoses:    ICD-10-CM ICD-9-CM   1. Adult ADHD  F90.9 314.01   2. Generalized anxiety disorder  F41.1 300.02   3. Major depressive disorder, recurrent episode, moderate  F33.1 296.32         GOALS:  Short Term Goals: Patient will be compliant with medication, and patient will have no significant medication related side effects.  Patient will be engaged in psychotherapy as indicated.  Patient will report subjective improvement of symptoms.  Long term goals: To stabilize mood and treat/improve subjective symptoms, the patient will stay out of the hospital, the patient will be at an optimal level of functioning, and the patient will take all medications as prescribed.  The patient/guardian verbalized understanding and agreement with goals that were mutually  set.      TREATMENT PLAN: Continue supportive psychotherapy efforts and medications as indicated.  Pharmacological and Non-Pharmacological treatment options discussed during today's visit. Patient/Guardian acknowledged and verbally consented with current treatment plan and was educated on the importance of compliance with treatment and follow-up appointments.      MEDICATION ISSUES:  Discussed medication options and treatment plan of prescribed medication as well as the risks, benefits, any black box warnings, and side effects including potential falls, possible impaired driving, and metabolic adversities among others. Patient is agreeable to call the office with any worsening of symptoms or onset of side effects, or if any concerns or questions arise.  The contact information for the office is made available to the patient. Patient is agreeable to call 911 or go to the nearest ER should they begin having any SI/HI, or if any urgent concerns arise. No medication side effects or related complaints today.     MEDS ORDERED DURING VISIT:  New Medications Ordered This Visit   Medications    topiramate (Topamax) 100 MG tablet     Sig: Take 1 tablet by mouth 2 (Two) Times a Day.     Dispense:  60 tablet     Refill:  1    Amphet-Dextroamphet 3-Bead ER 50 MG capsule sustained-release 24 hr     Sig: Take 1 capsule by mouth Every Morning.     Dispense:  7 capsule     Refill:  0     -Patient reports having difficulty remembering to take Adderall 3 times per day and is sometimes forgetting to take it to work with her during 12+ hour shifts.  She has tried Concerta, Vyvanse, and Adderall XR in the past with minimal relief of symptoms.  - Discontinue Adderall 15mg take 2 in the morning, 1 at lunch, and 1 in the afternoon.  -Start Mydayis 50 mg by mouth once daily in the morning for ADHD  - Increase Topamax to 100 mg by mouth twice daily for mood.   --Continue hydroxyzine 25 mg PO PRN BID for anxiety.   -  Effexor 75 mg by mouth  daily was restarted by her OBGYN, Dr. Gunter in [place].  Patient had previously requested to discontinue this medication due to sedation but had resumed taking it.  - Continue Rexulti 2 mg by mouth daily for mood.  - Jimmy reviewed at this time and is appropriate.  - Discussed with patient possible side effects to include tachycardia, hypertension, cardiac death, anxiety, irritability, and mood fluctuations.  Patient verbalizes understanding and is aware to stop the medication and seek medical attention immediately if she begins to experience any of these symptoms.  - Encourage patient to go to nearest ED if SI/HI develop.      Follow Up Appointment:   No follow-ups on file.             This document has been electronically signed by KRISHNA Burgess  September 4, 2024 15:16 EDT    Dictated Utilizing Dragon Dictation: Part of this note may be an electronic transcription/translation of spoken language to printed text using the Dragon Dictation System.

## 2024-09-12 ENCOUNTER — TELEMEDICINE (OUTPATIENT)
Dept: PSYCHIATRY | Facility: CLINIC | Age: 44
End: 2024-09-12
Payer: COMMERCIAL

## 2024-09-12 DIAGNOSIS — G47.19 EXCESSIVE DAYTIME SLEEPINESS: ICD-10-CM

## 2024-09-12 DIAGNOSIS — F41.1 GENERALIZED ANXIETY DISORDER: Primary | ICD-10-CM

## 2024-09-12 DIAGNOSIS — F33.1 MAJOR DEPRESSIVE DISORDER, RECURRENT EPISODE, MODERATE: ICD-10-CM

## 2024-09-12 DIAGNOSIS — F90.9 ADULT ADHD: ICD-10-CM

## 2024-09-12 RX ORDER — DEXTROAMPHETAMINE SACCHARATE, AMPHETAMINE ASPARTATE MONOHYDRATE, DEXTROAMPHETAMINE SULFATE, AMPHETAMINE SULFATE 12.5; 12.5; 12.5; 12.5 MG/1; MG/1; MG/1; MG/1
50 CAPSULE, EXTENDED RELEASE ORAL EVERY MORNING
Qty: 30 CAPSULE | Refills: 0 | Status: SHIPPED | OUTPATIENT
Start: 2024-09-12

## 2024-09-12 NOTE — PROGRESS NOTES
This provider is located at the [facility] (through [facility]), [address] [place] [address], [id number] using a secure MyChart Video Visit through [employer]. Patient is being seen remotely via telehealth at their home address in [place], and stated they are in a secure environment for this session. The patient's condition being diagnosed/treated is appropriate for telemedicine. The provider identified herself as well as her credentials.  The patient, and/or patients guardian, consent to be seen remotely, and when consent is given they understand that the consent allows for patient identifiable information to be sent to a third party as needed.   They may refuse to be seen remotely at any time. The electronic data is encrypted and password protected, and the patient and/or guardian has been advised of the potential risks to privacy not withstanding such measures.    You have chosen to receive care through a telehealth visit.  Do you consent to use a video/audio connection for your medical care today? Yes    Patient identifiers utilized: Name and date of birth.    Patient verbally confirmed consent for today's encounter:  September 12, 2024    Subjective   Yuni Garcia is a 44 y.o. female who presents today for follow up    Chief Complaint:  ADHD, depression,    History of Present Illness:    History of Present Illness  Yuni is a 44-year-old female who presents today for a follow-up visit with me.  She tells me that she has not been able to get her Mydayis filled yet due to the pharmacy having to order the medication. She requests a 30 day supply to prevent a lapse in treatment if this medication is effective.  She feels like her mood has been well controlled with the Rexulti and Effexor.  Appetite has been well-managed with Topamax and states that she is not eating as much.  Sleep has been okay.  Anxiety and depression have been manageable.  Denies any side effects of the medications.  She does worry about her  oldest son who she feels like struggles with body image and she does not know how to help him feel better.  Denies any SI/HI/AVH.         The following portions of the patient's history were reviewed and updated as appropriate: allergies, current medications, past family history, past medical history, past social history, past surgical history and problem list.    Past Psychiatric History:  Began Treatment: Several years ago.  She has been primarily treated from her primary care provider but has seen two PMHNP's for 1 visit each.  Diagnoses:Depression, Anxiety, and OCD, insomnia  Psychiatrist: Has seen Kandis Sneed and Flower Benoit here in the past.  Therapist:Denies  Admission History:Denies  Medication Trials: zoloft, lexapro, celexa, effexor, cymbalta, prozac, wellbutrin, vraylar, caplyta, abilify, trazodone, Concerta, Vyvanse, Adderall, Adderall XR  Self Harm: Denies  Suicide Attempts:Denies   Psychosis, Anxiety, Depression: Denies    Past Medical History:  Past Medical History:   Diagnosis Date    ADHD (attention deficit hyperactivity disorder)     Depression     HTN (hypertension)        Substance Abuse History:   Denies    Social History:  Social History     Socioeconomic History    Marital status:    Tobacco Use    Smoking status: Never    Smokeless tobacco: Never   Vaping Use    Vaping status: Never Used   Substance and Sexual Activity    Alcohol use: Never    Drug use: Never    Sexual activity: Defer       Family History:  Family History   Problem Relation Age of Onset    Diabetes Mother     Heart disease Father        Past Surgical History:  Past Surgical History:   Procedure Laterality Date    BARIATRIC SURGERY      BREAST SURGERY      lift    GALLBLADDER SURGERY      STOMACH SURGERY      tummy tuck    TUBAL ABDOMINAL LIGATION         Problem List:  Patient Active Problem List   Diagnosis    Abdominal wall bulge       Allergy:   Allergies   Allergen Reactions    Latex Hives    Bactrim  [Sulfamethoxazole-Trimethoprim] Rash        Current Medications:   Current Outpatient Medications   Medication Sig Dispense Refill    Amphet-Dextroamphet 3-Bead ER 50 MG capsule sustained-release 24 hr Take 1 capsule by mouth Every Morning. 30 capsule 0    amLODIPine (NORVASC) 5 MG tablet       benazepril (LOTENSIN) 10 MG tablet Take 1 tablet by mouth Daily.      Brexpiprazole 2 MG tablet Take 1 tablet by mouth Daily. 30 tablet 1    cyanocobalamin 1000 MCG/ML injection       Diclofenac Sodium (VOLTAREN) 1 % gel gel diclofenac 1 % topical gel      EQ Aspirin Adult Low Dose 81 MG EC tablet Take 1 tablet by mouth Daily.      esomeprazole (nexIUM) 40 MG capsule Take 1 capsule by mouth Daily.      ferrous sulfate 324 (65 Fe) MG tablet delayed-release EC tablet Take 1 tablet by mouth Daily.      fexofenadine (ALLEGRA) 180 MG tablet Take 1 tablet by mouth Daily.      fluconazole (DIFLUCAN) 150 MG tablet TAKE 1 TABLET BY MOUTH ONCE DAILY FOR 3 DAYS      fluticasone (FLONASE) 50 MCG/ACT nasal spray       furosemide (LASIX) 40 MG tablet Take 1 tablet by mouth Daily.      HYDROcodone-acetaminophen (NORCO) 7.5-325 MG per tablet Take 1 tablet by mouth Every 8 (Eight) Hours As Needed. for pain      hydrOXYzine (ATARAX) 25 MG tablet Take 1 tablet by mouth 2 (Two) Times a Day As Needed for Anxiety. 60 tablet 0    levothyroxine (SYNTHROID, LEVOTHROID) 150 MCG tablet Take 1 tablet by mouth Daily.      methocarbamol (ROBAXIN) 750 MG tablet TAKE 1 TABLET BY MOUTH 4 TIMES DAILY AS NEEDED      nitroglycerin (NITROSTAT) 0.4 MG SL tablet Place 1 tablet under the tongue Every 5 (Five) Minutes As Needed for Chest Pain. do not exceed a total of 3 doses in 15 minutes      ondansetron (ZOFRAN) 8 MG tablet Take 1 mg by mouth Every 8 (Eight) Hours As Needed for Nausea or Vomiting.      ondansetron (Zofran) 8 MG tablet Take 1 tablet by mouth Daily As Needed for Nausea or Vomiting. Take with medications in the morning related to nausea with  prescription medications 30 tablet 0    phenazopyridine (PYRIDIUM) 100 MG tablet TAKE 1 TABLET BY MOUTH THREE TIMES DAILY AFTER A MEAL AS NEEDED      potassium chloride 10 MEQ CR tablet TAKE 3 TABLETS BY MOUTH TWICE DAILY WITH FOOD      pregabalin (LYRICA) 50 MG capsule Take 3 capsules by mouth Every 12 (Twelve) Hours.      topiramate (Topamax) 100 MG tablet Take 1 tablet by mouth 2 (Two) Times a Day. 60 tablet 1    venlafaxine XR (Effexor XR) 150 MG 24 hr capsule Take 1 capsule by mouth Daily. 30 capsule 1    vitamin B-12 (CYANOCOBALAMIN) 1000 MCG tablet Take 1 tablet by mouth 1 (One) Time Per Week.      vitamin D (ERGOCALCIFEROL) 1.25 MG (08440 UT) capsule capsule Take 1 capsule by mouth 1 (One) Time Per Week.       No current facility-administered medications for this visit.       Review of Systems:    Review of Systems   Constitutional:  Positive for unexpected weight gain. Negative for fatigue.   Respiratory: Negative.     Cardiovascular: Negative.    Neurological:  Negative for headache.   Psychiatric/Behavioral:  Positive for decreased concentration, depressed mood and stress. Negative for agitation, behavioral problems, dysphoric mood, hallucinations, self-injury, sleep disturbance, suicidal ideas and negative for hyperactivity. The patient is not nervous/anxious.        Physical Exam:   Physical Exam  Constitutional:       Appearance: Normal appearance.   Pulmonary:      Effort: Pulmonary effort is normal.   Neurological:      Mental Status: She is alert and oriented to person, place, and time. Mental status is at baseline.   Psychiatric:         Attention and Perception: Attention and perception normal. She is attentive.         Mood and Affect: Mood is not depressed. Affect is not flat or tearful.         Speech: Speech normal.         Behavior: Behavior normal. Behavior is cooperative.         Thought Content: Thought content normal.         Cognition and Memory: Cognition normal. Memory is impaired.          Judgment: Judgment normal.       Vitals:  There were no vitals taken for this visit. There is no height or weight on file to calculate BMI.  Due to extenuating circumstances and possible current health risks associated with the patient being present in a clinical setting (with current health restrictions in place in regards to possible COVID 19 transmission/exposure), the patient was seen remotely today via a MyChart Video Visit through [facility].  Unable to obtain vital signs due to nature of remote visit.      Last 3 Blood Pressure Readings:  BP Readings from Last 3 Encounters:   03/08/24 128/79   12/07/23 138/80   11/07/23 138/80       Mental Status Exam:   Hygiene:   good  Cooperation:  Cooperative  Eye Contact:  Good  Psychomotor Behavior:  Appropriate  Affect:  Full range  Mood: normal  Hopelessness: 0  Speech:  Normal  Thought Process:  Linear  Thought Content:  Normal  Suicidal:  None  Homicidal:  None  Hallucinations:  None  Delusion:  None  Memory:  Intact  Orientation:  Person, Place, Time, and Situation  Reliability:  good  Insight:  Good  Judgement:  Fair  Impulse Control:  Fair  Physical/Medical Issues:  Yes see past medical history       Lab Results:   No visits with results within 3 Month(s) from this visit.   Latest known visit with results is:   Office Visit on 09/14/2023   Component Date Value Ref Range Status    External Amphetamine Screen Urine 09/14/2023 Negative   Final    External Benzodiazepine Screen Uri* 09/14/2023 Negative   Final    External Cocaine Screen Urine 09/14/2023 Negative   Final    External THC Screen Urine 09/14/2023 Negative   Final    External Methadone Screen Urine 09/14/2023 Negative   Final    External Methamphetamine Screen Ur* 09/14/2023 Negative   Final    External Oxycodone Screen Urine 09/14/2023 Negative   Final    External Buprenorphine Screen Urine 09/14/2023 Negative   Final    External MDMA 09/14/2023 Negative   Final    External Opiates Screen Urine 09/14/2023  Positive (A)   Final         Assessment & Plan   Diagnoses and all orders for this visit:    1. Generalized anxiety disorder (Primary)    2. Adult ADHD  -     Amphet-Dextroamphet 3-Bead ER 50 MG capsule sustained-release 24 hr; Take 1 capsule by mouth Every Morning.  Dispense: 30 capsule; Refill: 0    3. Major depressive disorder, recurrent episode, moderate    4. Excessive daytime sleepiness              Visit Diagnoses:    ICD-10-CM ICD-9-CM   1. Generalized anxiety disorder  F41.1 300.02   2. Adult ADHD  F90.9 314.01   3. Major depressive disorder, recurrent episode, moderate  F33.1 296.32   4. Excessive daytime sleepiness  G47.19 780.54           GOALS:  Short Term Goals: Patient will be compliant with medication, and patient will have no significant medication related side effects.  Patient will be engaged in psychotherapy as indicated.  Patient will report subjective improvement of symptoms.  Long term goals: To stabilize mood and treat/improve subjective symptoms, the patient will stay out of the hospital, the patient will be at an optimal level of functioning, and the patient will take all medications as prescribed.  The patient/guardian verbalized understanding and agreement with goals that were mutually set.      TREATMENT PLAN: Continue supportive psychotherapy efforts and medications as indicated.  Pharmacological and Non-Pharmacological treatment options discussed during today's visit. Patient/Guardian acknowledged and verbally consented with current treatment plan and was educated on the importance of compliance with treatment and follow-up appointments.      MEDICATION ISSUES:  Discussed medication options and treatment plan of prescribed medication as well as the risks, benefits, any black box warnings, and side effects including potential falls, possible impaired driving, and metabolic adversities among others. Patient is agreeable to call the office with any worsening of symptoms or onset of side  effects, or if any concerns or questions arise.  The contact information for the office is made available to the patient. Patient is agreeable to call 911 or go to the nearest ER should they begin having any SI/HI, or if any urgent concerns arise. No medication side effects or related complaints today.     MEDS ORDERED DURING VISIT:  New Medications Ordered This Visit   Medications    Amphet-Dextroamphet 3-Bead ER 50 MG capsule sustained-release 24 hr     Sig: Take 1 capsule by mouth Every Morning.     Dispense:  30 capsule     Refill:  0     Please cancel prescription for 7 day and fill 30 day supply.     -Patient reports having difficulty remembering to take Adderall 3 times per day and is sometimes forgetting to take it to work with her during 12+ hour shifts.  She has tried Concerta, Vyvanse, and Adderall XR in the past with minimal relief of symptoms.     -Start Mydayis 50 mg by mouth once daily in the morning for ADHD.  He has been unable to get this due to pharmacy having to order the medication and requests for 30-day supply to be filled.  - Continue Topamax  100 mg by mouth twice daily for mood.   --Continue hydroxyzine 25 mg PO PRN BID for anxiety.   - Continue Effexor 75 mg by mouth daily for mood.  - Continue Rexulti 2 mg by mouth daily for mood.  - Jimmy reviewed at this time and is appropriate.  - Discussed with patient possible side effects to include tachycardia, hypertension, cardiac death, anxiety, irritability, and mood fluctuations.  Patient verbalizes understanding and is aware to stop the medication and seek medical attention immediately if she begins to experience any of these symptoms.  - Encourage patient to go to nearest ED if SI/HI develop.      Follow Up Appointment:   No follow-ups on file.             This document has been electronically signed by KRISHNA Burgess  September 12, 2024 14:05 EDT         Dictated Utilizing Dragon Dictation: Part of this note may be an electronic  transcription/translation of spoken language to printed text using the Dragon Dictation System.

## 2024-09-25 ENCOUNTER — OFFICE VISIT (OUTPATIENT)
Dept: PSYCHIATRY | Facility: CLINIC | Age: 44
End: 2024-09-25
Payer: COMMERCIAL

## 2024-09-25 VITALS
DIASTOLIC BLOOD PRESSURE: 82 MMHG | WEIGHT: 189.6 LBS | HEIGHT: 60 IN | BODY MASS INDEX: 37.22 KG/M2 | OXYGEN SATURATION: 96 % | HEART RATE: 75 BPM | SYSTOLIC BLOOD PRESSURE: 124 MMHG

## 2024-09-25 DIAGNOSIS — F90.9 ADULT ADHD: Primary | ICD-10-CM

## 2024-09-25 DIAGNOSIS — G47.19 EXCESSIVE DAYTIME SLEEPINESS: ICD-10-CM

## 2024-09-25 DIAGNOSIS — F41.1 GENERALIZED ANXIETY DISORDER: ICD-10-CM

## 2024-09-25 DIAGNOSIS — F33.1 MAJOR DEPRESSIVE DISORDER, RECURRENT EPISODE, MODERATE: ICD-10-CM

## 2024-09-25 RX ORDER — DEXTROAMPHETAMINE SACCHARATE, AMPHETAMINE ASPARTATE MONOHYDRATE, DEXTROAMPHETAMINE SULFATE, AMPHETAMINE SULFATE 12.5; 12.5; 12.5; 12.5 MG/1; MG/1; MG/1; MG/1
50 CAPSULE, EXTENDED RELEASE ORAL DAILY
Qty: 30 CAPSULE | Refills: 0 | Status: SHIPPED | OUTPATIENT
Start: 2024-09-25

## 2024-10-04 ENCOUNTER — TELEMEDICINE (OUTPATIENT)
Dept: PSYCHIATRY | Facility: CLINIC | Age: 44
End: 2024-10-04
Payer: COMMERCIAL

## 2024-10-04 DIAGNOSIS — F41.1 GENERALIZED ANXIETY DISORDER: ICD-10-CM

## 2024-10-04 DIAGNOSIS — F33.1 MAJOR DEPRESSIVE DISORDER, RECURRENT EPISODE, MODERATE: ICD-10-CM

## 2024-10-04 DIAGNOSIS — F90.9 ADULT ADHD: ICD-10-CM

## 2024-10-04 DIAGNOSIS — G47.19 EXCESSIVE DAYTIME SLEEPINESS: Primary | ICD-10-CM

## 2024-10-04 RX ORDER — HYDROXYZINE HYDROCHLORIDE 25 MG/1
25 TABLET, FILM COATED ORAL 2 TIMES DAILY PRN
Qty: 60 TABLET | Refills: 2 | Status: SHIPPED | OUTPATIENT
Start: 2024-10-04

## 2024-10-04 RX ORDER — TOPIRAMATE 100 MG/1
100 TABLET, FILM COATED ORAL 2 TIMES DAILY
Qty: 60 TABLET | Refills: 2 | Status: SHIPPED | OUTPATIENT
Start: 2024-10-04

## 2024-10-04 RX ORDER — VENLAFAXINE HYDROCHLORIDE 150 MG/1
150 CAPSULE, EXTENDED RELEASE ORAL DAILY
Qty: 30 CAPSULE | Refills: 2 | Status: SHIPPED | OUTPATIENT
Start: 2024-10-04

## 2024-10-04 RX ORDER — DEXTROAMPHETAMINE SACCHARATE, AMPHETAMINE ASPARTATE, DEXTROAMPHETAMINE SULFATE AND AMPHETAMINE SULFATE 2.5; 2.5; 2.5; 2.5 MG/1; MG/1; MG/1; MG/1
10 TABLET ORAL DAILY
Qty: 30 TABLET | Refills: 0 | Status: SHIPPED | OUTPATIENT
Start: 2024-10-04 | End: 2025-10-04

## 2024-10-04 NOTE — PROGRESS NOTES
This provider is located at the [facility] (through [facility]), [address] [place] [address], [id number] using a secure [employer] Video Visit through [employer]. Patient is being seen remotely via telehealth at their home address in [place], and stated they are in a secure environment for this session. The patient's condition being diagnosed/treated is appropriate for telemedicine. The provider identified herself as well as her credentials.  The patient, and/or patients guardian, consent to be seen remotely, and when consent is given they understand that the consent allows for patient identifiable information to be sent to a third party as needed.   They may refuse to be seen remotely at any time. The electronic data is encrypted and password protected, and the patient and/or guardian has been advised of the potential risks to privacy not withstanding such measures.     You have chosen to receive care through a telehealth visit.  Do you consent to use a video/audio connection for your medical care today? Yes     Patient identifiers utilized: Name and date of birth.     Patient verbally confirmed consent for today's encounter:  September 12, 2024  Subjective   Yuni Garcia is a 44 y.o. female who presents today for follow up    Chief Complaint:  ADHD, depression,    History of Present Illness:    History of Present Illness  Yuni is a 44-year-old female who presents today for a follow-up visit with me.  She was seen via telehealth today.  She tells me that she was able to get the Mydayis filled and states that it has been working very well for her with no side effects or issues.  She tells me that she does notice that it wears off before her days are over and struggles in the evenings to get anything completed.  She is having a hysterectomy done on Wednesday and is worried that this will significantly affect her mood.  She states that now, her mood has been stabilized and she feels good throughout the day.  Sleep  has been good.  Appetite is good.  She has not been binge eating as much.  She feels like the Mydayis has been much easier to take than multiple Adderall per day although she feels like she may need Adderall in the late afternoon.  No major issues with depression or anxiety.  No SI/HI/AVH.        The following portions of the patient's history were reviewed and updated as appropriate: allergies, current medications, past family history, past medical history, past social history, past surgical history and problem list.     Past Psychiatric History:  Began Treatment: Several years ago.  She has been primarily treated from her primary care provider but has seen two PMHNP's for 1 visit each.  Diagnoses:Depression, Anxiety, and OCD, insomnia  Psychiatrist: Has seen Kandis Sneed and Flower Benoit here in the past.  Therapist:Denies  Admission History:Denies  Medication Trials: zoloft, lexapro, celexa, effexor, cymbalta, prozac, wellbutrin, vraylar, caplyta, abilify, trazodone, Concerta, Vyvanse, Adderall, Adderall XR  Self Harm: Denies  Suicide Attempts:Denies   Psychosis, Anxiety, Depression: Denies    Past Medical History:  Past Medical History:   Diagnosis Date    ADHD (attention deficit hyperactivity disorder)     Depression     HTN (hypertension)      Substance Abuse History:   Denies    Social History:  Social History     Socioeconomic History    Marital status:    Tobacco Use    Smoking status: Never    Smokeless tobacco: Never   Vaping Use    Vaping status: Never Used   Substance and Sexual Activity    Alcohol use: Never    Drug use: Never    Sexual activity: Defer     Family History:  Family History   Problem Relation Age of Onset    Diabetes Mother     Heart disease Father      Past Surgical History:  Past Surgical History:   Procedure Laterality Date    BARIATRIC SURGERY      BREAST SURGERY      lift    GALLBLADDER SURGERY      STOMACH SURGERY      tummy tuck    TUBAL ABDOMINAL LIGATION          Problem List:  Patient Active Problem List   Diagnosis    Abdominal wall bulge       Allergy:   Allergies   Allergen Reactions    Latex Hives    Bactrim [Sulfamethoxazole-Trimethoprim] Rash        Current Medications:   Current Outpatient Medications   Medication Sig Dispense Refill    Brexpiprazole 2 MG tablet Take 1 tablet by mouth Daily. 30 tablet 2    hydrOXYzine (ATARAX) 25 MG tablet Take 1 tablet by mouth 2 (Two) Times a Day As Needed for Anxiety. 60 tablet 2    topiramate (Topamax) 100 MG tablet Take 1 tablet by mouth 2 (Two) Times a Day. 60 tablet 2    venlafaxine XR (Effexor XR) 150 MG 24 hr capsule Take 1 capsule by mouth Daily. 30 capsule 2    amLODIPine (NORVASC) 5 MG tablet       Amphet-Dextroamphet 3-Bead ER (Mydayis) 50 MG capsule sustained-release 24 hr Take 1 capsule by mouth Daily. 30 capsule 0    amphetamine-dextroamphetamine (Adderall) 10 MG tablet Take 1 tablet by mouth Daily. Take in the afternoon for breakthrough ADHD symptoms. 30 tablet 0    benazepril (LOTENSIN) 10 MG tablet Take 1 tablet by mouth Daily.      cyanocobalamin 1000 MCG/ML injection       Diclofenac Sodium (VOLTAREN) 1 % gel gel diclofenac 1 % topical gel      EQ Aspirin Adult Low Dose 81 MG EC tablet Take 1 tablet by mouth Daily.      esomeprazole (nexIUM) 40 MG capsule Take 1 capsule by mouth Daily.      ferrous sulfate 324 (65 Fe) MG tablet delayed-release EC tablet Take 1 tablet by mouth Daily.      fexofenadine (ALLEGRA) 180 MG tablet Take 1 tablet by mouth Daily.      fluconazole (DIFLUCAN) 150 MG tablet TAKE 1 TABLET BY MOUTH ONCE DAILY FOR 3 DAYS      fluticasone (FLONASE) 50 MCG/ACT nasal spray       furosemide (LASIX) 40 MG tablet Take 1 tablet by mouth Daily.      HYDROcodone-acetaminophen (NORCO) 7.5-325 MG per tablet Take 1 tablet by mouth Every 8 (Eight) Hours As Needed. for pain      levothyroxine (SYNTHROID, LEVOTHROID) 150 MCG tablet Take 1 tablet by mouth Daily.      methocarbamol (ROBAXIN) 750 MG tablet  TAKE 1 TABLET BY MOUTH 4 TIMES DAILY AS NEEDED      nitroglycerin (NITROSTAT) 0.4 MG SL tablet Place 1 tablet under the tongue Every 5 (Five) Minutes As Needed for Chest Pain. do not exceed a total of 3 doses in 15 minutes      ondansetron (ZOFRAN) 8 MG tablet Take 1 mg by mouth Every 8 (Eight) Hours As Needed for Nausea or Vomiting.      ondansetron (Zofran) 8 MG tablet Take 1 tablet by mouth Daily As Needed for Nausea or Vomiting. Take with medications in the morning related to nausea with prescription medications 30 tablet 0    phenazopyridine (PYRIDIUM) 100 MG tablet TAKE 1 TABLET BY MOUTH THREE TIMES DAILY AFTER A MEAL AS NEEDED      potassium chloride 10 MEQ CR tablet TAKE 3 TABLETS BY MOUTH TWICE DAILY WITH FOOD      pregabalin (LYRICA) 50 MG capsule Take 3 capsules by mouth Every 12 (Twelve) Hours.      vitamin B-12 (CYANOCOBALAMIN) 1000 MCG tablet Take 1 tablet by mouth 1 (One) Time Per Week.      vitamin D (ERGOCALCIFEROL) 1.25 MG (32229 UT) capsule capsule Take 1 capsule by mouth 1 (One) Time Per Week.       No current facility-administered medications for this visit.       Review of Systems:    Review of Systems   Constitutional:  Negative for fatigue and unexpected weight gain.   Respiratory: Negative.     Cardiovascular: Negative.    Neurological:  Negative for headache.   Psychiatric/Behavioral:  Positive for decreased concentration and stress. Negative for agitation, behavioral problems, dysphoric mood, hallucinations, self-injury, sleep disturbance, suicidal ideas, negative for hyperactivity and depressed mood. The patient is not nervous/anxious.        Physical Exam:   Physical Exam  Vitals reviewed.   Constitutional:       Appearance: Normal appearance.   Pulmonary:      Effort: Pulmonary effort is normal.   Neurological:      Mental Status: She is alert and oriented to person, place, and time. Mental status is at baseline.   Psychiatric:         Attention and Perception: Attention and perception  normal. She is attentive.         Mood and Affect: Mood is not depressed. Affect is not flat or tearful.         Speech: Speech normal.         Behavior: Behavior normal. Behavior is cooperative.         Thought Content: Thought content normal.         Cognition and Memory: Cognition normal. Memory is impaired.         Judgment: Judgment normal.       Vitals:  There were no vitals taken for this visit. There is no height or weight on file to calculate BMI.    Last 3 Blood Pressure Readings:  BP Readings from Last 3 Encounters:   09/25/24 124/82   03/08/24 128/79   12/07/23 138/80       Mental Status Exam:   Hygiene:   good  Cooperation:  Cooperative  Eye Contact:  Good  Psychomotor Behavior:  Appropriate  Affect:  Full range  Mood: normal  Hopelessness: 0  Speech:  Normal  Thought Process:  Linear  Thought Content:  Normal  Suicidal:  None  Homicidal:  None  Hallucinations:  None  Delusion:  None  Memory:  Intact  Orientation:  Person, Place, Time, and Situation  Reliability:  good  Insight:  Good  Judgement:  Fair  Impulse Control:  Fair  Physical/Medical Issues:  Yes see past medical history       Lab Results:   No visits with results within 3 Month(s) from this visit.   Latest known visit with results is:   Office Visit on 09/14/2023   Component Date Value Ref Range Status    External Amphetamine Screen Urine 09/14/2023 Negative   Final    External Benzodiazepine Screen Uri* 09/14/2023 Negative   Final    External Cocaine Screen Urine 09/14/2023 Negative   Final    External THC Screen Urine 09/14/2023 Negative   Final    External Methadone Screen Urine 09/14/2023 Negative   Final    External Methamphetamine Screen Ur* 09/14/2023 Negative   Final    External Oxycodone Screen Urine 09/14/2023 Negative   Final    External Buprenorphine Screen Urine 09/14/2023 Negative   Final    External MDMA 09/14/2023 Negative   Final    External Opiates Screen Urine 09/14/2023 Positive (A)   Final       Assessment & Plan    Diagnoses and all orders for this visit:    1. Excessive daytime sleepiness (Primary)    2. Adult ADHD  -     amphetamine-dextroamphetamine (Adderall) 10 MG tablet; Take 1 tablet by mouth Daily. Take in the afternoon for breakthrough ADHD symptoms.  Dispense: 30 tablet; Refill: 0    3. Generalized anxiety disorder  -     hydrOXYzine (ATARAX) 25 MG tablet; Take 1 tablet by mouth 2 (Two) Times a Day As Needed for Anxiety.  Dispense: 60 tablet; Refill: 2  -     venlafaxine XR (Effexor XR) 150 MG 24 hr capsule; Take 1 capsule by mouth Daily.  Dispense: 30 capsule; Refill: 2  -     Brexpiprazole 2 MG tablet; Take 1 tablet by mouth Daily.  Dispense: 30 tablet; Refill: 2  -     topiramate (Topamax) 100 MG tablet; Take 1 tablet by mouth 2 (Two) Times a Day.  Dispense: 60 tablet; Refill: 2    4. Major depressive disorder, recurrent episode, moderate  -     venlafaxine XR (Effexor XR) 150 MG 24 hr capsule; Take 1 capsule by mouth Daily.  Dispense: 30 capsule; Refill: 2  -     Brexpiprazole 2 MG tablet; Take 1 tablet by mouth Daily.  Dispense: 30 tablet; Refill: 2  -     topiramate (Topamax) 100 MG tablet; Take 1 tablet by mouth 2 (Two) Times a Day.  Dispense: 60 tablet; Refill: 2            Visit Diagnoses:    ICD-10-CM ICD-9-CM   1. Excessive daytime sleepiness  G47.19 780.54   2. Adult ADHD  F90.9 314.01   3. Generalized anxiety disorder  F41.1 300.02   4. Major depressive disorder, recurrent episode, moderate  F33.1 296.32         GOALS:  Short Term Goals: Patient will be compliant with medication, and patient will have no significant medication related side effects.  Patient will be engaged in psychotherapy as indicated.  Patient will report subjective improvement of symptoms.  Long term goals: To stabilize mood and treat/improve subjective symptoms, the patient will stay out of the hospital, the patient will be at an optimal level of functioning, and the patient will take all medications as prescribed.  The  patient/guardian verbalized understanding and agreement with goals that were mutually set.      TREATMENT PLAN: Continue supportive psychotherapy efforts and medications as indicated.  Pharmacological and Non-Pharmacological treatment options discussed during today's visit. Patient/Guardian acknowledged and verbally consented with current treatment plan and was educated on the importance of compliance with treatment and follow-up appointments.      MEDICATION ISSUES:  Discussed medication options and treatment plan of prescribed medication as well as the risks, benefits, any black box warnings, and side effects including potential falls, possible impaired driving, and metabolic adversities among others. Patient is agreeable to call the office with any worsening of symptoms or onset of side effects, or if any concerns or questions arise.  The contact information for the office is made available to the patient. Patient is agreeable to call 911 or go to the nearest ER should they begin having any SI/HI, or if any urgent concerns arise. No medication side effects or related complaints today.     MEDS ORDERED DURING VISIT:  New Medications Ordered This Visit   Medications    hydrOXYzine (ATARAX) 25 MG tablet     Sig: Take 1 tablet by mouth 2 (Two) Times a Day As Needed for Anxiety.     Dispense:  60 tablet     Refill:  2    venlafaxine XR (Effexor XR) 150 MG 24 hr capsule     Sig: Take 1 capsule by mouth Daily.     Dispense:  30 capsule     Refill:  2    amphetamine-dextroamphetamine (Adderall) 10 MG tablet     Sig: Take 1 tablet by mouth Daily. Take in the afternoon for breakthrough ADHD symptoms.     Dispense:  30 tablet     Refill:  0    Brexpiprazole 2 MG tablet     Sig: Take 1 tablet by mouth Daily.     Dispense:  30 tablet     Refill:  2    topiramate (Topamax) 100 MG tablet     Sig: Take 1 tablet by mouth 2 (Two) Times a Day.     Dispense:  60 tablet     Refill:  2     - Continue Mydayis 50 mg by mouth once daily  in the morning for ADHD.   - Add Adderall 10 mg by mouth daily in the afternoon for breakthrough ADHD symptoms.   - Continue Topamax 100 mg by mouth twice daily for mood.   -- Continue hydroxyzine 25 mg PO PRN BID for anxiety.   - Continue Effexor 150 mg by mouth daily for mood.  - Continue Rexulti 2 mg by mouth daily for mood.  - Jimmy reviewed at this time and is appropriate.  - Discussed with patient possible side effects to include tachycardia, hypertension, cardiac death, anxiety, irritability, and mood fluctuations.  Patient verbalizes understanding and is aware to stop the medication and seek medical attention immediately if she begins to experience any of these symptoms.  - Encourage patient to go to nearest ED if SI/HI develop.      Follow Up Appointment:   No follow-ups on file.             This document has been electronically signed by KRISHNA Burgess  October 4, 2024 14:17 EDT         Dictated Utilizing Dragon Dictation: Part of this note may be an electronic transcription/translation of spoken language to printed text using the Dragon Dictation System.

## 2024-10-29 DIAGNOSIS — F90.9 ADULT ADHD: ICD-10-CM

## 2024-10-30 RX ORDER — DEXTROAMPHETAMINE SACCHARATE, AMPHETAMINE ASPARTATE MONOHYDRATE, DEXTROAMPHETAMINE SULFATE, AMPHETAMINE SULFATE 12.5; 12.5; 12.5; 12.5 MG/1; MG/1; MG/1; MG/1
50 CAPSULE, EXTENDED RELEASE ORAL DAILY
Qty: 30 CAPSULE | Refills: 0 | Status: SHIPPED | OUTPATIENT
Start: 2024-10-30

## 2024-10-30 RX ORDER — DEXTROAMPHETAMINE SACCHARATE, AMPHETAMINE ASPARTATE, DEXTROAMPHETAMINE SULFATE AND AMPHETAMINE SULFATE 2.5; 2.5; 2.5; 2.5 MG/1; MG/1; MG/1; MG/1
10 TABLET ORAL DAILY
Qty: 30 TABLET | Refills: 0 | Status: SHIPPED | OUTPATIENT
Start: 2024-10-30 | End: 2025-10-30

## 2024-11-21 DIAGNOSIS — F90.9 ADULT ADHD: ICD-10-CM

## 2024-11-21 RX ORDER — DEXTROAMPHETAMINE SACCHARATE, AMPHETAMINE ASPARTATE, DEXTROAMPHETAMINE SULFATE AND AMPHETAMINE SULFATE 2.5; 2.5; 2.5; 2.5 MG/1; MG/1; MG/1; MG/1
10 TABLET ORAL DAILY
Qty: 30 TABLET | Refills: 0 | Status: SHIPPED | OUTPATIENT
Start: 2024-11-21 | End: 2025-11-21

## 2024-11-21 RX ORDER — DEXTROAMPHETAMINE SACCHARATE, AMPHETAMINE ASPARTATE MONOHYDRATE, DEXTROAMPHETAMINE SULFATE, AMPHETAMINE SULFATE 12.5; 12.5; 12.5; 12.5 MG/1; MG/1; MG/1; MG/1
50 CAPSULE, EXTENDED RELEASE ORAL DAILY
Qty: 30 CAPSULE | Refills: 0 | Status: SHIPPED | OUTPATIENT
Start: 2024-11-21

## 2024-11-21 NOTE — TELEPHONE ENCOUNTER
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

## 2024-12-16 DIAGNOSIS — F90.9 ADULT ADHD: ICD-10-CM

## 2024-12-16 RX ORDER — DEXTROAMPHETAMINE SACCHARATE, AMPHETAMINE ASPARTATE MONOHYDRATE, DEXTROAMPHETAMINE SULFATE, AMPHETAMINE SULFATE 12.5; 12.5; 12.5; 12.5 MG/1; MG/1; MG/1; MG/1
50 CAPSULE, EXTENDED RELEASE ORAL DAILY
Qty: 30 CAPSULE | Refills: 0 | OUTPATIENT
Start: 2024-12-16

## 2024-12-16 RX ORDER — DEXTROAMPHETAMINE SACCHARATE, AMPHETAMINE ASPARTATE, DEXTROAMPHETAMINE SULFATE AND AMPHETAMINE SULFATE 2.5; 2.5; 2.5; 2.5 MG/1; MG/1; MG/1; MG/1
10 TABLET ORAL DAILY
Qty: 30 TABLET | Refills: 0 | Status: SHIPPED | OUTPATIENT
Start: 2024-12-16 | End: 2025-12-16

## 2024-12-23 DIAGNOSIS — F90.9 ADULT ADHD: ICD-10-CM

## 2024-12-23 RX ORDER — DEXTROAMPHETAMINE SACCHARATE, AMPHETAMINE ASPARTATE MONOHYDRATE, DEXTROAMPHETAMINE SULFATE, AMPHETAMINE SULFATE 12.5; 12.5; 12.5; 12.5 MG/1; MG/1; MG/1; MG/1
50 CAPSULE, EXTENDED RELEASE ORAL DAILY
Qty: 30 CAPSULE | Refills: 0 | Status: SHIPPED | OUTPATIENT
Start: 2024-12-23

## 2024-12-27 DIAGNOSIS — F33.1 MAJOR DEPRESSIVE DISORDER, RECURRENT EPISODE, MODERATE: ICD-10-CM

## 2024-12-27 DIAGNOSIS — F41.1 GENERALIZED ANXIETY DISORDER: ICD-10-CM

## 2024-12-30 RX ORDER — TOPIRAMATE 100 MG/1
100 TABLET, FILM COATED ORAL 2 TIMES DAILY
Qty: 60 TABLET | Refills: 0 | Status: SHIPPED | OUTPATIENT
Start: 2024-12-30

## 2025-01-22 ENCOUNTER — OFFICE VISIT (OUTPATIENT)
Dept: PSYCHIATRY | Facility: CLINIC | Age: 45
End: 2025-01-22
Payer: COMMERCIAL

## 2025-01-22 VITALS
WEIGHT: 179.8 LBS | SYSTOLIC BLOOD PRESSURE: 110 MMHG | BODY MASS INDEX: 35.3 KG/M2 | DIASTOLIC BLOOD PRESSURE: 70 MMHG | HEIGHT: 60 IN | HEART RATE: 83 BPM

## 2025-01-22 DIAGNOSIS — F90.9 ADULT ADHD: ICD-10-CM

## 2025-01-22 DIAGNOSIS — F41.1 GENERALIZED ANXIETY DISORDER: ICD-10-CM

## 2025-01-22 DIAGNOSIS — Z79.899 MEDICATION MANAGEMENT: Primary | ICD-10-CM

## 2025-01-22 LAB
AMPHET+METHAMPHET UR QL: POSITIVE
AMPHETAMINES UR QL: NEGATIVE
BARBITURATES UR QL SCN: NEGATIVE
BENZODIAZ UR QL SCN: NEGATIVE
BUPRENORPHINE SERPL-MCNC: NEGATIVE NG/ML
CANNABINOIDS SERPL QL: NEGATIVE
COCAINE UR QL: NEGATIVE
ETHANOL URINE SCREEN: NEGATIVE
FENTANYL UR-MCNC: NEGATIVE NG/ML
GABAPENTIN SERPLBLD-MCNC: NEGATIVE UG/ML
MDMA UR QL SCN: NEGATIVE
METHADONE UR QL SCN: NEGATIVE
MORPHINE/OPIATES SCREEN, URINE: POSITIVE
OXYCODONE UR QL SCN: NEGATIVE
PCP UR QL SCN: NEGATIVE
PROPOXYPH UR QL SCN: NEGATIVE
TRICYCLICS UR QL SCN: NEGATIVE

## 2025-01-22 RX ORDER — AMOXICILLIN 500 MG/1
500 CAPSULE ORAL 2 TIMES DAILY
COMMUNITY
Start: 2025-01-14

## 2025-01-22 RX ORDER — VALACYCLOVIR HYDROCHLORIDE 500 MG/1
1 TABLET, FILM COATED ORAL DAILY
COMMUNITY
Start: 2024-11-14

## 2025-01-22 RX ORDER — DEXTROAMPHETAMINE SACCHARATE, AMPHETAMINE ASPARTATE, DEXTROAMPHETAMINE SULFATE AND AMPHETAMINE SULFATE 7.5; 7.5; 7.5; 7.5 MG/1; MG/1; MG/1; MG/1
30 TABLET ORAL DAILY
Qty: 30 TABLET | Refills: 0 | Status: SHIPPED | OUTPATIENT
Start: 2025-01-22 | End: 2026-01-22

## 2025-01-22 RX ORDER — DEXTROAMPHETAMINE SACCHARATE, AMPHETAMINE ASPARTATE MONOHYDRATE, DEXTROAMPHETAMINE SULFATE AND AMPHETAMINE SULFATE 7.5; 7.5; 7.5; 7.5 MG/1; MG/1; MG/1; MG/1
30 CAPSULE, EXTENDED RELEASE ORAL EVERY MORNING
Qty: 30 CAPSULE | Refills: 0 | Status: SHIPPED | OUTPATIENT
Start: 2025-01-22

## 2025-01-22 RX ORDER — HYDROXYZINE HYDROCHLORIDE 25 MG/1
25 TABLET, FILM COATED ORAL 2 TIMES DAILY PRN
Qty: 60 TABLET | Refills: 2 | Status: SHIPPED | OUTPATIENT
Start: 2025-01-22

## 2025-01-22 RX ORDER — PAROXETINE 40 MG/1
40 TABLET, FILM COATED ORAL EVERY MORNING
COMMUNITY
Start: 2025-01-10

## 2025-01-22 NOTE — PROGRESS NOTES
ADRIENNE Marrero   Yuni Garcia is a 44 y.o. female who presents today for follow up    Chief Complaint:  ADHD, depression,    History of Present Illness:    History of Present Illness  Yuni is a 44-year-old female who presents today for a follow-up visit with me.      She tells me that she has been doing fair since her last visit. She states after her hysterectomy, she was crying over everything but now feels like her mood has improved.  Since her last visit she had also stopped taking Effexor and was titrated up to Paxil 40 mg daily by her PCP which she feels has been beneficial.  She states that she doesn't feel like the Mydayis has worked as well as Adderall.  She is unable to complete tasks at home and at work and these tasks do require more of her energy.  Without a stimulant, she can sleep in excess of 16+ hours per day.  Appetite has been decreased. Sleep is good. She is working night shift but has decreased her amount of hours.  She states that she is helping her mom quite a bit now.  She denies any side effects of the medications.  She has been treated for narcolepsy and excessive daytime fatigue in the past with Concerta prescribed by her PCP and I did discuss with patient that something like this may benefit her more than switching the dose of Adderall.  Patient states that she would like to try extended release Adderall with Adderall for breakthrough symptoms as well.  She denies any side effects.  No chest pain or other cardiac issues.  No SI/HI/AVH.  Overall mood has been relatively stable.    The following portions of the patient's history were reviewed and updated as appropriate: allergies, current medications, past family history, past medical history, past social history, past surgical history and problem list.     Past Psychiatric History:  Began Treatment: Several years ago.  She has been primarily treated from her primary care provider but has seen two PMHNP's for 1 visit  each.  Diagnoses:Depression, Anxiety, and OCD, insomnia  Psychiatrist: Has seen Kandis Sneed and Flower Benoit here in the past.  Therapist:Denies  Admission History:Denies  Medication Trials: zoloft, lexapro, celexa, effexor, cymbalta, prozac, wellbutrin, vraylar, caplyta, abilify, trazodone, Concerta, Vyvanse, Adderall, Adderall XR  Self Harm: Denies  Suicide Attempts:Denies   Psychosis, Anxiety, Depression: Denies    Past Medical History:  Past Medical History:   Diagnosis Date    ADHD (attention deficit hyperactivity disorder)     Depression     HTN (hypertension)      Substance Abuse History:   Denies    Social History:  Social History     Socioeconomic History    Marital status:    Tobacco Use    Smoking status: Never    Smokeless tobacco: Never   Vaping Use    Vaping status: Never Used   Substance and Sexual Activity    Alcohol use: Never    Drug use: Never    Sexual activity: Defer     Family History:  Family History   Problem Relation Age of Onset    Diabetes Mother     Heart disease Father      Past Surgical History:  Past Surgical History:   Procedure Laterality Date    BARIATRIC SURGERY      BREAST SURGERY      lift    GALLBLADDER SURGERY      STOMACH SURGERY      tummy tuck    TUBAL ABDOMINAL LIGATION         Problem List:  Patient Active Problem List   Diagnosis    Abdominal wall bulge       Allergy:   Allergies   Allergen Reactions    Latex Hives    Bactrim [Sulfamethoxazole-Trimethoprim] Rash        Current Medications:   Current Outpatient Medications   Medication Sig Dispense Refill    amoxicillin (AMOXIL) 500 MG capsule Take 1 capsule by mouth 2 (Two) Times a Day.      benazepril (LOTENSIN) 10 MG tablet Take 1 tablet by mouth Daily.      Brexpiprazole 2 MG tablet Take 1 tablet by mouth Daily. 30 tablet 2    cyanocobalamin 1000 MCG/ML injection       Diclofenac Sodium (VOLTAREN) 1 % gel gel diclofenac 1 % topical gel      EQ Aspirin Adult Low Dose 81 MG EC tablet Take 1 tablet by  mouth Daily.      esomeprazole (nexIUM) 40 MG capsule Take 1 capsule by mouth Daily.      ferrous sulfate 324 (65 Fe) MG tablet delayed-release EC tablet Take 1 tablet by mouth Daily.      fexofenadine (ALLEGRA) 180 MG tablet Take 1 tablet by mouth Daily.      fluconazole (DIFLUCAN) 150 MG tablet TAKE 1 TABLET BY MOUTH ONCE DAILY FOR 3 DAYS      fluticasone (FLONASE) 50 MCG/ACT nasal spray       furosemide (LASIX) 40 MG tablet Take 1 tablet by mouth Daily.      HYDROcodone-acetaminophen (NORCO) 7.5-325 MG per tablet Take 1 tablet by mouth Every 8 (Eight) Hours As Needed. for pain      hydrOXYzine (ATARAX) 25 MG tablet Take 1 tablet by mouth 2 (Two) Times a Day As Needed for Anxiety. 60 tablet 2    levothyroxine (SYNTHROID, LEVOTHROID) 150 MCG tablet Take 1 tablet by mouth Daily.      methocarbamol (ROBAXIN) 750 MG tablet TAKE 1 TABLET BY MOUTH 4 TIMES DAILY AS NEEDED      nitroglycerin (NITROSTAT) 0.4 MG SL tablet Place 1 tablet under the tongue Every 5 (Five) Minutes As Needed for Chest Pain. do not exceed a total of 3 doses in 15 minutes      ondansetron (Zofran) 8 MG tablet Take 1 tablet by mouth Daily As Needed for Nausea or Vomiting. Take with medications in the morning related to nausea with prescription medications 30 tablet 0    PARoxetine (PAXIL) 40 MG tablet Take 1 tablet by mouth Every Morning.      phenazopyridine (PYRIDIUM) 100 MG tablet TAKE 1 TABLET BY MOUTH THREE TIMES DAILY AFTER A MEAL AS NEEDED      potassium chloride 10 MEQ CR tablet TAKE 3 TABLETS BY MOUTH TWICE DAILY WITH FOOD      pregabalin (LYRICA) 50 MG capsule Take 3 capsules by mouth Every 12 (Twelve) Hours.      topiramate (TOPAMAX) 100 MG tablet Take 1 tablet by mouth twice daily 60 tablet 0    valACYclovir (VALTREX) 500 MG tablet Take 1 tablet by mouth Daily.      vitamin B-12 (CYANOCOBALAMIN) 1000 MCG tablet Take 1 tablet by mouth 1 (One) Time Per Week.      vitamin D (ERGOCALCIFEROL) 1.25 MG (45127 UT) capsule capsule Take 1 capsule  "by mouth 1 (One) Time Per Week.      amLODIPine (NORVASC) 5 MG tablet  (Patient not taking: Reported on 1/22/2025)      amphetamine-dextroamphetamine (Adderall) 30 MG tablet Take 1 tablet by mouth Daily. Take at lunchtime 30 tablet 0    amphetamine-dextroamphetamine XR (ADDERALL XR) 30 MG 24 hr capsule Take 1 capsule by mouth Every Morning 30 capsule 0     No current facility-administered medications for this visit.       Review of Systems:    Review of Systems   Constitutional:  Positive for appetite change, fatigue and unexpected weight gain.   Respiratory: Negative.     Cardiovascular: Negative.    Neurological:  Negative for headache.   Psychiatric/Behavioral:  Positive for decreased concentration and stress. Negative for agitation, behavioral problems, dysphoric mood, hallucinations, self-injury, sleep disturbance, suicidal ideas, negative for hyperactivity and depressed mood. The patient is nervous/anxious.        Physical Exam:   Physical Exam  Vitals reviewed.   Constitutional:       Appearance: Normal appearance.   Pulmonary:      Effort: Pulmonary effort is normal.   Neurological:      Mental Status: She is alert and oriented to person, place, and time. Mental status is at baseline.   Psychiatric:         Attention and Perception: Attention and perception normal. She is attentive.         Mood and Affect: Mood is not depressed. Affect is not flat or tearful.         Speech: Speech normal.         Behavior: Behavior normal. Behavior is cooperative.         Thought Content: Thought content normal.         Cognition and Memory: Cognition normal. Memory is impaired.         Judgment: Judgment normal.       Vitals:  Blood pressure 110/70, pulse 83, height 152.4 cm (60\"), weight 81.6 kg (179 lb 12.8 oz). Body mass index is 35.11 kg/m².    Last 3 Blood Pressure Readings:  BP Readings from Last 3 Encounters:   01/22/25 110/70   09/25/24 124/82   03/08/24 128/79       Mental Status Exam:   Hygiene:   " good  Cooperation:  Cooperative  Eye Contact:  Good  Psychomotor Behavior:  Appropriate  Affect:  Full range  Mood: normal  Hopelessness: 0  Speech:  Normal  Thought Process:  Linear  Thought Content:  Normal  Suicidal:  None  Homicidal:  None  Hallucinations:  None  Delusion:  None  Memory:  Intact  Orientation:  Person, Place, Time, and Situation  Reliability:  good  Insight:  Good  Judgement:  Fair  Impulse Control:  Fair  Physical/Medical Issues:  Yes see past medical history       Lab Results:   Office Visit on 01/22/2025   Component Date Value Ref Range Status    Amphetamine Screen, Urine 01/22/2025 Positive (A)  Negative Final    PRELIMINARY RESULTS. PLEASE REFER TO THE [facility] LAB CONFIRMATION FOR FINAL RESULTS.    Barbiturates Screen, Urine 01/22/2025 Negative  Negative Final    Buprenorphine, Screen, Urine 01/22/2025 Negative  Negative Final    Benzodiazepine Screen, Urine 01/22/2025 Negative  Negative Final    Cocaine Screen, Urine 01/22/2025 Negative  Negative Final    MDMA (ECSTASY) 01/22/2025 Negative  Negative Final    Methamphetamine, Ur 01/22/2025 Negative  Negative Final    Methadone Screen, Urine 01/22/2025 Negative  Negative Final    Morphine/Opiates Screen, Urine 01/22/2025 Positive (A)  Negative Final    Oxycodone Screen, Urine 01/22/2025 Negative  Negative Final    Phencyclidine (PCP), Urine 01/22/2025 Negative  Negative Final    Propoxyphene Scree, Urine 01/22/2025 Negative  Negative Final    Tricyclic Antidepressants Screen 01/22/2025 Negative  Negative Final    THC, Screen, Urine 01/22/2025 Negative  Negative Final    Gabapentin 01/22/2025 NEGATIVE   Final    ETHANOL URINE SCREEN 01/22/2025 Negative   Final    Fentanyl, Urine 01/22/2025 Negative  Negative Final       Assessment & Plan   Diagnoses and all orders for this visit:    1. Medication management (Primary)  -     POC Medline 14 Panel Urine Drug Screen    2. Adult ADHD  -     amphetamine-dextroamphetamine XR (ADDERALL XR) 30 MG 24 hr  capsule; Take 1 capsule by mouth Every Morning  Dispense: 30 capsule; Refill: 0  -     amphetamine-dextroamphetamine (Adderall) 30 MG tablet; Take 1 tablet by mouth Daily. Take at lunchtime  Dispense: 30 tablet; Refill: 0    3. Generalized anxiety disorder  -     hydrOXYzine (ATARAX) 25 MG tablet; Take 1 tablet by mouth 2 (Two) Times a Day As Needed for Anxiety.  Dispense: 60 tablet; Refill: 2      Visit Diagnoses:    ICD-10-CM ICD-9-CM   1. Medication management  Z79.899 V58.69   2. Adult ADHD  F90.9 314.01   3. Generalized anxiety disorder  F41.1 300.02       GOALS:  Short Term Goals: Patient will be compliant with medication, and patient will have no significant medication related side effects.  Patient will be engaged in psychotherapy as indicated.  Patient will report subjective improvement of symptoms.  Long term goals: To stabilize mood and treat/improve subjective symptoms, the patient will stay out of the hospital, the patient will be at an optimal level of functioning, and the patient will take all medications as prescribed.  The patient/guardian verbalized understanding and agreement with goals that were mutually set.      TREATMENT PLAN: Continue supportive psychotherapy efforts and medications as indicated.  Pharmacological and Non-Pharmacological treatment options discussed during today's visit. Patient/Guardian acknowledged and verbally consented with current treatment plan and was educated on the importance of compliance with treatment and follow-up appointments.      MEDICATION ISSUES:  Discussed medication options and treatment plan of prescribed medication as well as the risks, benefits, any black box warnings, and side effects including potential falls, possible impaired driving, and metabolic adversities among others. Patient is agreeable to call the office with any worsening of symptoms or onset of side effects, or if any concerns or questions arise.  The contact information for the office is  made available to the patient. Patient is agreeable to call 911 or go to the nearest ER should they begin having any SI/HI, or if any urgent concerns arise. No medication side effects or related complaints today.     MEDS ORDERED DURING VISIT:  New Medications Ordered This Visit   Medications    amphetamine-dextroamphetamine XR (ADDERALL XR) 30 MG 24 hr capsule     Sig: Take 1 capsule by mouth Every Morning     Dispense:  30 capsule     Refill:  0    amphetamine-dextroamphetamine (Adderall) 30 MG tablet     Sig: Take 1 tablet by mouth Daily. Take at lunchtime     Dispense:  30 tablet     Refill:  0    hydrOXYzine (ATARAX) 25 MG tablet     Sig: Take 1 tablet by mouth 2 (Two) Times a Day As Needed for Anxiety.     Dispense:  60 tablet     Refill:  2     -Patient reports having difficulty remembering to take Adderall 3 times per day and is sometimes forgetting to take it to work with her during 12+ hour shifts.  She has tried Concerta, Vyvanse, and Adderall XR in the past with minimal relief of symptoms.     - Discontinue Mydayis 50 mg. we will try Adderall XR 30 mg by mouth every day in the morning and Adderall 30 mg by mouth at lunchtime for breakthrough ADHD symptoms.     - Continue Topamax  100 mg by mouth twice daily for mood.   --Continue hydroxyzine 25 mg PO PRN BID for anxiety.   - Continue Paxil 40 mg by mouth daily for mood.  - Continue Rexulti 2 mg by mouth daily for mood.  - Jimmy reviewed at this time and is appropriate.  - Discussed with patient possible side effects to include tachycardia, hypertension, cardiac death, anxiety, irritability, and mood fluctuations.  Patient verbalizes understanding and is aware to stop the medication and seek medical attention immediately if she begins to experience any of these symptoms.  - Encourage patient to go to nearest ED if SI/HI develop.       Follow Up Appointment:   Return in about 4 weeks (around 2/19/2025).             This document has been electronically  signed by KRISHNA Burgess  January 22, 2025 14:59 EST         Dictated Utilizing Dragon Dictation: Part of this note may be an electronic transcription/translation of spoken language to printed text using the Dragon Dictation System.

## 2025-01-23 ENCOUNTER — PRIOR AUTHORIZATION (OUTPATIENT)
Dept: PSYCHIATRY | Facility: CLINIC | Age: 45
End: 2025-01-23
Payer: COMMERCIAL

## 2025-01-23 NOTE — TELEPHONE ENCOUNTER
ELIZABETH WEEKS (Key: [id number])  [place] Case ID #: [id number]  Rx #: [id number]  Amphetamine-Dextroamphet ER 30MG er capsules

## 2025-01-23 NOTE — TELEPHONE ENCOUNTER
Approved today by [place] [employer] 2017  The request has been approved. The authorization is effective from 01/23/2025 to 01/23/2026, as long as the member is enrolled in their current health plan. A written notification letter will follow with additional details.  Authorization Expiration Date: 1/22/2026

## 2025-02-19 ENCOUNTER — TELEMEDICINE (OUTPATIENT)
Dept: PSYCHIATRY | Facility: CLINIC | Age: 45
End: 2025-02-19
Payer: COMMERCIAL

## 2025-02-19 DIAGNOSIS — G47.19 EXCESSIVE DAYTIME SLEEPINESS: ICD-10-CM

## 2025-02-19 DIAGNOSIS — F41.1 GENERALIZED ANXIETY DISORDER: ICD-10-CM

## 2025-02-19 DIAGNOSIS — F90.9 ADULT ADHD: Primary | ICD-10-CM

## 2025-02-19 DIAGNOSIS — F33.1 MAJOR DEPRESSIVE DISORDER, RECURRENT EPISODE, MODERATE: ICD-10-CM

## 2025-02-19 RX ORDER — PAROXETINE 40 MG/1
40 TABLET, FILM COATED ORAL EVERY MORNING
Qty: 30 TABLET | Refills: 1 | Status: SHIPPED | OUTPATIENT
Start: 2025-02-19

## 2025-02-19 RX ORDER — DEXTROAMPHETAMINE SACCHARATE, AMPHETAMINE ASPARTATE, DEXTROAMPHETAMINE SULFATE AND AMPHETAMINE SULFATE 7.5; 7.5; 7.5; 7.5 MG/1; MG/1; MG/1; MG/1
30 TABLET ORAL DAILY
Qty: 30 TABLET | Refills: 0 | Status: SHIPPED | OUTPATIENT
Start: 2025-02-19 | End: 2026-02-19

## 2025-02-19 RX ORDER — DEXTROAMPHETAMINE SACCHARATE, AMPHETAMINE ASPARTATE MONOHYDRATE, DEXTROAMPHETAMINE SULFATE AND AMPHETAMINE SULFATE 7.5; 7.5; 7.5; 7.5 MG/1; MG/1; MG/1; MG/1
30 CAPSULE, EXTENDED RELEASE ORAL EVERY MORNING
Qty: 30 CAPSULE | Refills: 0 | Status: SHIPPED | OUTPATIENT
Start: 2025-02-19

## 2025-02-19 NOTE — PROGRESS NOTES
This provider is located at the [facility] (through [facility]), [address] [place] [address], [id number] using a secure [employer] Video Visit through [employer]. Patient is being seen remotely via telehealth at their home address in [place], and stated they are in a secure environment for this session. The patient's condition being diagnosed/treated is appropriate for telemedicine. The provider identified herself as well as her credentials.  The patient, and/or patients guardian, consent to be seen remotely, and when consent is given they understand that the consent allows for patient identifiable information to be sent to a third party as needed.   They may refuse to be seen remotely at any time. The electronic data is encrypted and password protected, and the patient and/or guardian has been advised of the potential risks to privacy not withstanding such measures.     You have chosen to receive care through a telehealth visit.  Do you consent to use a video/audio connection for your medical care today? Yes     Patient identifiers utilized: Name and date of birth.     Patient verbally confirmed consent for today's encounter: February 19, 2024    Subjective   Yuni Garcia is a 45 y.o. female who presents today for follow up    Chief Complaint:  ADHD, depression,    History of Present Illness:    History of Present Illness  Yuni is a 45-year-old female who presents today for a follow-up visit.  Telehealth.  She feels like her current medication regimen has been working well for her.  She states that she has been doing very well over the past month and has had no issues.  States that she is working two shifts per week and has not been pushing her body as hard as she had before.  Denies having any issues with feeling depressed or anxious.  States that she is sleeping well but her sleep is not excessive like it has been in the past.  Reports having an appropriate appetite.  Denies any side effects of the medications  at this time. No chest pain or other cardiac issues.  No panic attacks or crying spells.  No SI/HI/AVH.  Overall mood has been relatively stable.    The following portions of the patient's history were reviewed and updated as appropriate: allergies, current medications, past family history, past medical history, past social history, past surgical history and problem list.     Past Psychiatric History:  Began Treatment: Several years ago.  She has been primarily treated from her primary care provider but has seen two PMHNP's for 1 visit each.  Diagnoses:Depression, Anxiety, and OCD, insomnia  Psychiatrist: Has seen Kandis Sneed and Flower Benoit here in the past.  Therapist:Denies  Admission History:Denies  Medication Trials: zoloft, lexapro, celexa, effexor, cymbalta, prozac, wellbutrin, vraylar, caplyta, abilify, trazodone, Concerta, Vyvanse, Adderall, Adderall XR  Self Harm: Denies  Suicide Attempts:Denies   Psychosis, Anxiety, Depression: Denies    Past Medical History:  Past Medical History:   Diagnosis Date    ADHD (attention deficit hyperactivity disorder)     Depression     HTN (hypertension)      Substance Abuse History:   Denies    Social History:  Social History     Socioeconomic History    Marital status:    Tobacco Use    Smoking status: Never    Smokeless tobacco: Never   Vaping Use    Vaping status: Never Used   Substance and Sexual Activity    Alcohol use: Never    Drug use: Never    Sexual activity: Defer     Family History:  Family History   Problem Relation Age of Onset    Diabetes Mother     Heart disease Father      Past Surgical History:  Past Surgical History:   Procedure Laterality Date    BARIATRIC SURGERY      BREAST SURGERY      lift    GALLBLADDER SURGERY      STOMACH SURGERY      tummy tuck    TUBAL ABDOMINAL LIGATION         Problem List:  Patient Active Problem List   Diagnosis    Abdominal wall bulge       Allergy:   Allergies   Allergen Reactions    Latex Hives     Bactrim [Sulfamethoxazole-Trimethoprim] Rash        Current Medications:   Current Outpatient Medications   Medication Sig Dispense Refill    amphetamine-dextroamphetamine (Adderall) 30 MG tablet Take 1 tablet by mouth Daily. Take at lunchtime 30 tablet 0    amphetamine-dextroamphetamine XR (ADDERALL XR) 30 MG 24 hr capsule Take 1 capsule by mouth Every Morning 30 capsule 0    PARoxetine (PAXIL) 40 MG tablet Take 1 tablet by mouth Every Morning. 30 tablet 1    amLODIPine (NORVASC) 5 MG tablet  (Patient not taking: Reported on 1/22/2025)      amoxicillin (AMOXIL) 500 MG capsule Take 1 capsule by mouth 2 (Two) Times a Day.      benazepril (LOTENSIN) 10 MG tablet Take 1 tablet by mouth Daily.      Brexpiprazole 2 MG tablet Take 1 tablet by mouth Daily. 30 tablet 2    cyanocobalamin 1000 MCG/ML injection       Diclofenac Sodium (VOLTAREN) 1 % gel gel diclofenac 1 % topical gel      EQ Aspirin Adult Low Dose 81 MG EC tablet Take 1 tablet by mouth Daily.      esomeprazole (nexIUM) 40 MG capsule Take 1 capsule by mouth Daily.      ferrous sulfate 324 (65 Fe) MG tablet delayed-release EC tablet Take 1 tablet by mouth Daily.      fexofenadine (ALLEGRA) 180 MG tablet Take 1 tablet by mouth Daily.      fluconazole (DIFLUCAN) 150 MG tablet TAKE 1 TABLET BY MOUTH ONCE DAILY FOR 3 DAYS      fluticasone (FLONASE) 50 MCG/ACT nasal spray       furosemide (LASIX) 40 MG tablet Take 1 tablet by mouth Daily.      HYDROcodone-acetaminophen (NORCO) 7.5-325 MG per tablet Take 1 tablet by mouth Every 8 (Eight) Hours As Needed. for pain      hydrOXYzine (ATARAX) 25 MG tablet Take 1 tablet by mouth 2 (Two) Times a Day As Needed for Anxiety. 60 tablet 2    levothyroxine (SYNTHROID, LEVOTHROID) 150 MCG tablet Take 1 tablet by mouth Daily.      methocarbamol (ROBAXIN) 750 MG tablet TAKE 1 TABLET BY MOUTH 4 TIMES DAILY AS NEEDED      nitroglycerin (NITROSTAT) 0.4 MG SL tablet Place 1 tablet under the tongue Every 5 (Five) Minutes As Needed for  Chest Pain. do not exceed a total of 3 doses in 15 minutes      ondansetron (Zofran) 8 MG tablet Take 1 tablet by mouth Daily As Needed for Nausea or Vomiting. Take with medications in the morning related to nausea with prescription medications 30 tablet 0    phenazopyridine (PYRIDIUM) 100 MG tablet TAKE 1 TABLET BY MOUTH THREE TIMES DAILY AFTER A MEAL AS NEEDED      potassium chloride 10 MEQ CR tablet TAKE 3 TABLETS BY MOUTH TWICE DAILY WITH FOOD      pregabalin (LYRICA) 50 MG capsule Take 3 capsules by mouth Every 12 (Twelve) Hours.      topiramate (TOPAMAX) 100 MG tablet Take 1 tablet by mouth twice daily 60 tablet 0    valACYclovir (VALTREX) 500 MG tablet Take 1 tablet by mouth Daily.      vitamin B-12 (CYANOCOBALAMIN) 1000 MCG tablet Take 1 tablet by mouth 1 (One) Time Per Week.      vitamin D (ERGOCALCIFEROL) 1.25 MG (08358 UT) capsule capsule Take 1 capsule by mouth 1 (One) Time Per Week.       No current facility-administered medications for this visit.       Review of Systems:    Review of Systems   Constitutional:  Negative for fatigue and unexpected weight gain.   Respiratory: Negative.     Cardiovascular: Negative.    Neurological:  Negative for headache.   Psychiatric/Behavioral:  Positive for decreased concentration. Negative for agitation, behavioral problems, dysphoric mood, hallucinations, self-injury, sleep disturbance, suicidal ideas, negative for hyperactivity, depressed mood and stress. The patient is not nervous/anxious.        Physical Exam:   Physical Exam  Vitals reviewed.   Constitutional:       Appearance: Normal appearance.   Pulmonary:      Effort: Pulmonary effort is normal.   Neurological:      Mental Status: She is alert and oriented to person, place, and time. Mental status is at baseline.   Psychiatric:         Attention and Perception: Attention and perception normal. She is attentive.         Mood and Affect: Mood is not depressed. Affect is not flat or tearful.         Speech:  Speech normal.         Behavior: Behavior normal. Behavior is cooperative.         Thought Content: Thought content normal.         Cognition and Memory: Cognition normal. Memory is impaired.         Judgment: Judgment normal.       Vitals:  There were no vitals taken for this visit. There is no height or weight on file to calculate BMI.    Last 3 Blood Pressure Readings:  BP Readings from Last 3 Encounters:   01/22/25 110/70   09/25/24 124/82   03/08/24 128/79       Mental Status Exam:   Hygiene:   good  Cooperation:  Cooperative  Eye Contact:  Good  Psychomotor Behavior:  Appropriate  Affect:  Full range  Mood: normal  Hopelessness: 0  Speech:  Normal  Thought Process:  Linear  Thought Content:  Normal  Suicidal:  None  Homicidal:  None  Hallucinations:  None  Delusion:  None  Memory:  Intact  Orientation:  Person, Place, Time, and Situation  Reliability:  good  Insight:  Good  Judgement:  Fair  Impulse Control:  Fair  Physical/Medical Issues:  Yes see past medical history       Lab Results:   Office Visit on 01/22/2025   Component Date Value Ref Range Status    Amphetamine Screen, Urine 01/22/2025 Positive (A)  Negative Final    PRELIMINARY RESULTS. PLEASE REFER TO THE [facility] LAB CONFIRMATION FOR FINAL RESULTS.    Barbiturates Screen, Urine 01/22/2025 Negative  Negative Final    Buprenorphine, Screen, Urine 01/22/2025 Negative  Negative Final    Benzodiazepine Screen, Urine 01/22/2025 Negative  Negative Final    Cocaine Screen, Urine 01/22/2025 Negative  Negative Final    MDMA (ECSTASY) 01/22/2025 Negative  Negative Final    Methamphetamine, Ur 01/22/2025 Negative  Negative Final    Methadone Screen, Urine 01/22/2025 Negative  Negative Final    Morphine/Opiates Screen, Urine 01/22/2025 Positive (A)  Negative Final    Oxycodone Screen, Urine 01/22/2025 Negative  Negative Final    Phencyclidine (PCP), Urine 01/22/2025 Negative  Negative Final    Propoxyphene Scree, Urine 01/22/2025 Negative  Negative Final     Tricyclic Antidepressants Screen 01/22/2025 Negative  Negative Final    THC, Screen, Urine 01/22/2025 Negative  Negative Final    Gabapentin 01/22/2025 NEGATIVE   Final    ETHANOL URINE SCREEN 01/22/2025 Negative   Final    Fentanyl, Urine 01/22/2025 Negative  Negative Final       Assessment & Plan   Diagnoses and all orders for this visit:    1. Adult ADHD (Primary)  -     amphetamine-dextroamphetamine XR (ADDERALL XR) 30 MG 24 hr capsule; Take 1 capsule by mouth Every Morning  Dispense: 30 capsule; Refill: 0  -     amphetamine-dextroamphetamine (Adderall) 30 MG tablet; Take 1 tablet by mouth Daily. Take at lunchtime  Dispense: 30 tablet; Refill: 0    2. Major depressive disorder, recurrent episode, moderate  -     PARoxetine (PAXIL) 40 MG tablet; Take 1 tablet by mouth Every Morning.  Dispense: 30 tablet; Refill: 1    3. Excessive daytime sleepiness  -     amphetamine-dextroamphetamine XR (ADDERALL XR) 30 MG 24 hr capsule; Take 1 capsule by mouth Every Morning  Dispense: 30 capsule; Refill: 0  -     amphetamine-dextroamphetamine (Adderall) 30 MG tablet; Take 1 tablet by mouth Daily. Take at lunchtime  Dispense: 30 tablet; Refill: 0    4. Generalized anxiety disorder  -     PARoxetine (PAXIL) 40 MG tablet; Take 1 tablet by mouth Every Morning.  Dispense: 30 tablet; Refill: 1              Visit Diagnoses:    ICD-10-CM ICD-9-CM   1. Adult ADHD  F90.9 314.01   2. Major depressive disorder, recurrent episode, moderate  F33.1 296.32   3. Excessive daytime sleepiness  G47.19 780.54   4. Generalized anxiety disorder  F41.1 300.02           GOALS:  Short Term Goals: Patient will be compliant with medication, and patient will have no significant medication related side effects.  Patient will be engaged in psychotherapy as indicated.  Patient will report subjective improvement of symptoms.  Long term goals: To stabilize mood and treat/improve subjective symptoms, the patient will stay out of the hospital, the patient will be  at an optimal level of functioning, and the patient will take all medications as prescribed.  The patient/guardian verbalized understanding and agreement with goals that were mutually set.      TREATMENT PLAN: Continue supportive psychotherapy efforts and medications as indicated.  Pharmacological and Non-Pharmacological treatment options discussed during today's visit. Patient/Guardian acknowledged and verbally consented with current treatment plan and was educated on the importance of compliance with treatment and follow-up appointments.      MEDICATION ISSUES:  Discussed medication options and treatment plan of prescribed medication as well as the risks, benefits, any black box warnings, and side effects including potential falls, possible impaired driving, and metabolic adversities among others. Patient is agreeable to call the office with any worsening of symptoms or onset of side effects, or if any concerns or questions arise.  The contact information for the office is made available to the patient. Patient is agreeable to call 911 or go to the nearest ER should they begin having any SI/HI, or if any urgent concerns arise. No medication side effects or related complaints today.     MEDS ORDERED DURING VISIT:  New Medications Ordered This Visit   Medications    amphetamine-dextroamphetamine XR (ADDERALL XR) 30 MG 24 hr capsule     Sig: Take 1 capsule by mouth Every Morning     Dispense:  30 capsule     Refill:  0    amphetamine-dextroamphetamine (Adderall) 30 MG tablet     Sig: Take 1 tablet by mouth Daily. Take at lunchtime     Dispense:  30 tablet     Refill:  0    PARoxetine (PAXIL) 40 MG tablet     Sig: Take 1 tablet by mouth Every Morning.     Dispense:  30 tablet     Refill:  1        - Continue Adderall XR 30 mg by mouth every day in the morning and Adderall 30 mg by mouth at lunchtime for breakthrough ADHD symptoms.  - Continue Topamax  100 mg by mouth twice daily for mood.   --Continue hydroxyzine 25  mg PO PRN BID for anxiety.   - Continue Paxil 40 mg by mouth daily for mood.  - Continue Rexulti 2 mg by mouth daily for mood.  - Jimmy reviewed at this time and is appropriate.  - Discussed with patient possible side effects to include tachycardia, hypertension, cardiac death, anxiety, irritability, and mood fluctuations.  Patient verbalizes understanding and is aware to stop the medication and seek medical attention immediately if she begins to experience any of these symptoms.  - Encourage patient to go to nearest ED if SI/HI develop.       Follow Up Appointment:   Return in about 4 weeks (around 3/19/2025) for Recheck.             This document has been electronically signed by KRISHNA Burgess  February 19, 2025 13:13 EST         Dictated Utilizing Dragon Dictation: Part of this note may be an electronic transcription/translation of spoken language to printed text using the Dragon Dictation System.

## 2025-03-19 ENCOUNTER — TELEMEDICINE (OUTPATIENT)
Dept: PSYCHIATRY | Facility: CLINIC | Age: 45
End: 2025-03-19
Payer: COMMERCIAL

## 2025-03-19 DIAGNOSIS — F41.1 GENERALIZED ANXIETY DISORDER: ICD-10-CM

## 2025-03-19 DIAGNOSIS — G47.19 EXCESSIVE DAYTIME SLEEPINESS: ICD-10-CM

## 2025-03-19 DIAGNOSIS — F33.1 MAJOR DEPRESSIVE DISORDER, RECURRENT EPISODE, MODERATE: ICD-10-CM

## 2025-03-19 DIAGNOSIS — F90.9 ADULT ADHD: Primary | ICD-10-CM

## 2025-03-19 RX ORDER — PAROXETINE 40 MG/1
40 TABLET, FILM COATED ORAL EVERY MORNING
Qty: 30 TABLET | Refills: 0 | Status: SHIPPED | OUTPATIENT
Start: 2025-03-19

## 2025-03-19 RX ORDER — TOPIRAMATE 100 MG/1
100 TABLET, FILM COATED ORAL 2 TIMES DAILY
Qty: 60 TABLET | Refills: 0 | Status: SHIPPED | OUTPATIENT
Start: 2025-03-19

## 2025-03-19 RX ORDER — DEXTROAMPHETAMINE SACCHARATE, AMPHETAMINE ASPARTATE, DEXTROAMPHETAMINE SULFATE AND AMPHETAMINE SULFATE 7.5; 7.5; 7.5; 7.5 MG/1; MG/1; MG/1; MG/1
30 TABLET ORAL DAILY
Qty: 30 TABLET | Refills: 0 | Status: SHIPPED | OUTPATIENT
Start: 2025-03-19 | End: 2026-03-19

## 2025-03-19 RX ORDER — DEXTROAMPHETAMINE SACCHARATE, AMPHETAMINE ASPARTATE MONOHYDRATE, DEXTROAMPHETAMINE SULFATE AND AMPHETAMINE SULFATE 7.5; 7.5; 7.5; 7.5 MG/1; MG/1; MG/1; MG/1
30 CAPSULE, EXTENDED RELEASE ORAL EVERY MORNING
Qty: 30 CAPSULE | Refills: 0 | Status: SHIPPED | OUTPATIENT
Start: 2025-03-19

## 2025-03-19 RX ORDER — HYDROXYZINE HYDROCHLORIDE 50 MG/1
50 TABLET, FILM COATED ORAL 2 TIMES DAILY PRN
Qty: 60 TABLET | Refills: 0 | Status: SHIPPED | OUTPATIENT
Start: 2025-03-19

## 2025-03-19 NOTE — PROGRESS NOTES
This provider is located at the [facility] (through [facility]), [address] [place] [address], [phone] using a secure [employer] Video Visit through [employer]. Patient is being seen remotely via telehealth at their home address in [place], and stated they are in a secure environment for this session. The patient's condition being diagnosed/treated is appropriate for telemedicine. The provider identified herself as well as her credentials.  The patient, and/or patients guardian, consent to be seen remotely, and when consent is given they understand that the consent allows for patient identifiable information to be sent to a third party as needed.   They may refuse to be seen remotely at any time. The electronic data is encrypted and password protected, and the patient and/or guardian has been advised of the potential risks to privacy not withstanding such measures.     You have chosen to receive care through a telehealth visit.  Do you consent to use a video/audio connection for your medical care today? Yes     Patient identifiers utilized: Name and date of birth.     Patient verbally confirmed consent for today's encounter: March 19, 2024    Subjective   Yuni Garcia is a 45 y.o. female who presents today for follow up    Chief Complaint:  ADHD, depression,    History of Present Illness:    History of Present Illness  Yuni is a 45-year-old female who presents today for a follow-up visit via telehealth.  She feels like her current medication regimen has been working well for her and she has been stable for the last couple of months. She states that sometimes it takes a little bit to wind down after work but attributes this to having increased anxiety while working.  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  She is still working 2 night shifts per week.  Denies having any issues with depression.  Sleeping well the majority of the time.  Appetite is good.  Denies any side effects of the  medications at this time. No chest pain or other cardiac issues.  No panic attacks or crying spells.  No SI/HI/AVH.  Overall mood has been relatively stable.      The following portions of the patient's history were reviewed and updated as appropriate: allergies, current medications, past family history, past medical history, past social history, past surgical history and problem list.     Past Psychiatric History:  Began Treatment: Several years ago.  She has been primarily treated from her primary care provider but has seen two PMHNP's for 1 visit each.  Diagnoses:Depression, Anxiety, and OCD, insomnia  Psychiatrist: Has seen Kandis Sneed and Flower Benoit here in the past.  Therapist:Denies  Admission History:Denies  Medication Trials: zoloft, lexapro, celexa, effexor, cymbalta, prozac, wellbutrin, vraylar, caplyta, abilify, trazodone, Concerta, Vyvanse, Adderall, Adderall XR  Self Harm: Denies  Suicide Attempts:Denies   Psychosis, Anxiety, Depression: Denies    Past Medical History:  Past Medical History:   Diagnosis Date    ADHD (attention deficit hyperactivity disorder)     Depression     HTN (hypertension)      Substance Abuse History:   Denies    Social History:  Social History     Socioeconomic History    Marital status:    Tobacco Use    Smoking status: Never    Smokeless tobacco: Never   Vaping Use    Vaping status: Never Used   Substance and Sexual Activity    Alcohol use: Never    Drug use: Never    Sexual activity: Defer     Family History:  Family History   Problem Relation Age of Onset    Diabetes Mother     Heart disease Father      Past Surgical History:  Past Surgical History:   Procedure Laterality Date    BARIATRIC SURGERY      BREAST SURGERY      lift    GALLBLADDER SURGERY      STOMACH SURGERY      tummy tuck    TUBAL ABDOMINAL LIGATION         Problem List:  Patient Active Problem List   Diagnosis    Abdominal wall bulge       Allergy:   Allergies   Allergen Reactions    Latex  Hives    Bactrim [Sulfamethoxazole-Trimethoprim] Rash        Current Medications:   Current Outpatient Medications   Medication Sig Dispense Refill    amphetamine-dextroamphetamine (Adderall) 30 MG tablet Take 1 tablet by mouth Daily. Take at lunchtime 30 tablet 0    amphetamine-dextroamphetamine XR (ADDERALL XR) 30 MG 24 hr capsule Take 1 capsule by mouth Every Morning 30 capsule 0    hydrOXYzine (ATARAX) 50 MG tablet Take 1 tablet by mouth 2 (Two) Times a Day As Needed for Anxiety. 60 tablet 0    PARoxetine (PAXIL) 40 MG tablet Take 1 tablet by mouth Every Morning. 30 tablet 0    topiramate (TOPAMAX) 100 MG tablet Take 1 tablet by mouth 2 (Two) Times a Day. 60 tablet 0    amoxicillin (AMOXIL) 500 MG capsule Take 1 capsule by mouth 2 (Two) Times a Day.      benazepril (LOTENSIN) 10 MG tablet Take 1 tablet by mouth Daily.      Brexpiprazole 2 MG tablet Take 1 tablet by mouth Daily. 30 tablet 2    cyanocobalamin 1000 MCG/ML injection       Diclofenac Sodium (VOLTAREN) 1 % gel gel diclofenac 1 % topical gel      EQ Aspirin Adult Low Dose 81 MG EC tablet Take 1 tablet by mouth Daily.      esomeprazole (nexIUM) 40 MG capsule Take 1 capsule by mouth Daily.      ferrous sulfate 324 (65 Fe) MG tablet delayed-release EC tablet Take 1 tablet by mouth Daily.      fexofenadine (ALLEGRA) 180 MG tablet Take 1 tablet by mouth Daily.      fluconazole (DIFLUCAN) 150 MG tablet TAKE 1 TABLET BY MOUTH ONCE DAILY FOR 3 DAYS      fluticasone (FLONASE) 50 MCG/ACT nasal spray       furosemide (LASIX) 40 MG tablet Take 1 tablet by mouth Daily.      HYDROcodone-acetaminophen (NORCO) 7.5-325 MG per tablet Take 1 tablet by mouth Every 8 (Eight) Hours As Needed. for pain      levothyroxine (SYNTHROID, LEVOTHROID) 150 MCG tablet Take 1 tablet by mouth Daily.      methocarbamol (ROBAXIN) 750 MG tablet TAKE 1 TABLET BY MOUTH 4 TIMES DAILY AS NEEDED      nitroglycerin (NITROSTAT) 0.4 MG SL tablet Place 1 tablet under the tongue Every 5 (Five)  Minutes As Needed for Chest Pain. do not exceed a total of 3 doses in 15 minutes      phenazopyridine (PYRIDIUM) 100 MG tablet TAKE 1 TABLET BY MOUTH THREE TIMES DAILY AFTER A MEAL AS NEEDED      potassium chloride 10 MEQ CR tablet TAKE 3 TABLETS BY MOUTH TWICE DAILY WITH FOOD      pregabalin (LYRICA) 50 MG capsule Take 3 capsules by mouth Every 12 (Twelve) Hours.      valACYclovir (VALTREX) 500 MG tablet Take 1 tablet by mouth Daily.      vitamin B-12 (CYANOCOBALAMIN) 1000 MCG tablet Take 1 tablet by mouth 1 (One) Time Per Week.      vitamin D (ERGOCALCIFEROL) 1.25 MG (05779 UT) capsule capsule Take 1 capsule by mouth 1 (One) Time Per Week.       No current facility-administered medications for this visit.       Review of Systems:    Review of Systems   Constitutional:  Negative for fatigue and unexpected weight gain.   Respiratory: Negative.     Cardiovascular: Negative.    Neurological:  Negative for headache.   Psychiatric/Behavioral:  Negative for agitation, behavioral problems, decreased concentration, dysphoric mood, hallucinations, self-injury, sleep disturbance, suicidal ideas, negative for hyperactivity, depressed mood and stress. The patient is nervous/anxious.        Physical Exam:   Physical Exam  Vitals reviewed.   Constitutional:       Appearance: Normal appearance.   Pulmonary:      Effort: Pulmonary effort is normal.   Neurological:      Mental Status: She is alert and oriented to person, place, and time. Mental status is at baseline.   Psychiatric:         Attention and Perception: Attention and perception normal. She is attentive.         Mood and Affect: Mood is not depressed. Affect is not flat or tearful.         Speech: Speech normal.         Behavior: Behavior normal. Behavior is cooperative.         Thought Content: Thought content normal.         Cognition and Memory: Cognition normal. Memory is impaired.         Judgment: Judgment normal.       Vitals:  There were no vitals taken for this  visit. There is no height or weight on file to calculate BMI.    Last 3 Blood Pressure Readings:  BP Readings from Last 3 Encounters:   01/22/25 110/70   09/25/24 124/82   03/08/24 128/79       Mental Status Exam:   Hygiene:   good  Cooperation:  Cooperative  Eye Contact:  Good  Psychomotor Behavior:  Appropriate  Affect:  Full range  Mood: normal  Hopelessness: 0  Speech:  Normal  Thought Process:  Linear  Thought Content:  Normal  Suicidal:  None  Homicidal:  None  Hallucinations:  None  Delusion:  None  Memory:  Intact  Orientation:  Person, Place, Time, and Situation  Reliability:  good  Insight:  Good  Judgement:  Fair  Impulse Control:  Fair  Physical/Medical Issues:  Yes see past medical history       Lab Results:   Office Visit on 01/22/2025   Component Date Value Ref Range Status    Amphetamine Screen, Urine 01/22/2025 Positive (A)  Negative Final    PRELIMINARY RESULTS. PLEASE REFER TO THE [facility] LAB CONFIRMATION FOR FINAL RESULTS.    Barbiturates Screen, Urine 01/22/2025 Negative  Negative Final    Buprenorphine, Screen, Urine 01/22/2025 Negative  Negative Final    Benzodiazepine Screen, Urine 01/22/2025 Negative  Negative Final    Cocaine Screen, Urine 01/22/2025 Negative  Negative Final    MDMA (ECSTASY) 01/22/2025 Negative  Negative Final    Methamphetamine, Ur 01/22/2025 Negative  Negative Final    Methadone Screen, Urine 01/22/2025 Negative  Negative Final    Morphine/Opiates Screen, Urine 01/22/2025 Positive (A)  Negative Final    Oxycodone Screen, Urine 01/22/2025 Negative  Negative Final    Phencyclidine (PCP), Urine 01/22/2025 Negative  Negative Final    Propoxyphene Scree, Urine 01/22/2025 Negative  Negative Final    Tricyclic Antidepressants Screen 01/22/2025 Negative  Negative Final    THC, Screen, Urine 01/22/2025 Negative  Negative Final    Gabapentin 01/22/2025 NEGATIVE   Final    ETHANOL URINE SCREEN 01/22/2025 Negative   Final    Fentanyl, Urine 01/22/2025 Negative  Negative Final        Assessment & Plan   Diagnoses and all orders for this visit:    1. Adult ADHD (Primary)  -     amphetamine-dextroamphetamine (Adderall) 30 MG tablet; Take 1 tablet by mouth Daily. Take at lunchtime  Dispense: 30 tablet; Refill: 0  -     amphetamine-dextroamphetamine XR (ADDERALL XR) 30 MG 24 hr capsule; Take 1 capsule by mouth Every Morning  Dispense: 30 capsule; Refill: 0    2. Major depressive disorder, recurrent episode, moderate  -     PARoxetine (PAXIL) 40 MG tablet; Take 1 tablet by mouth Every Morning.  Dispense: 30 tablet; Refill: 0  -     topiramate (TOPAMAX) 100 MG tablet; Take 1 tablet by mouth 2 (Two) Times a Day.  Dispense: 60 tablet; Refill: 0    3. Excessive daytime sleepiness  -     amphetamine-dextroamphetamine (Adderall) 30 MG tablet; Take 1 tablet by mouth Daily. Take at lunchtime  Dispense: 30 tablet; Refill: 0  -     amphetamine-dextroamphetamine XR (ADDERALL XR) 30 MG 24 hr capsule; Take 1 capsule by mouth Every Morning  Dispense: 30 capsule; Refill: 0    4. Generalized anxiety disorder  -     hydrOXYzine (ATARAX) 50 MG tablet; Take 1 tablet by mouth 2 (Two) Times a Day As Needed for Anxiety.  Dispense: 60 tablet; Refill: 0  -     PARoxetine (PAXIL) 40 MG tablet; Take 1 tablet by mouth Every Morning.  Dispense: 30 tablet; Refill: 0  -     topiramate (TOPAMAX) 100 MG tablet; Take 1 tablet by mouth 2 (Two) Times a Day.  Dispense: 60 tablet; Refill: 0                Visit Diagnoses:    ICD-10-CM ICD-9-CM   1. Adult ADHD  F90.9 314.01   2. Major depressive disorder, recurrent episode, moderate  F33.1 296.32   3. Excessive daytime sleepiness  G47.19 780.54   4. Generalized anxiety disorder  F41.1 300.02             GOALS:  Short Term Goals: Patient will be compliant with medication, and patient will have no significant medication related side effects.  Patient will be engaged in psychotherapy as indicated.  Patient will report subjective improvement of symptoms.  Long term goals: To stabilize  mood and treat/improve subjective symptoms, the patient will stay out of the hospital, the patient will be at an optimal level of functioning, and the patient will take all medications as prescribed.  The patient/guardian verbalized understanding and agreement with goals that were mutually set.      TREATMENT PLAN: Continue supportive psychotherapy efforts and medications as indicated.  Pharmacological and Non-Pharmacological treatment options discussed during today's visit. Patient/Guardian acknowledged and verbally consented with current treatment plan and was educated on the importance of compliance with treatment and follow-up appointments.      MEDICATION ISSUES:  Discussed medication options and treatment plan of prescribed medication as well as the risks, benefits, any black box warnings, and side effects including potential falls, possible impaired driving, and metabolic adversities among others. Patient is agreeable to call the office with any worsening of symptoms or onset of side effects, or if any concerns or questions arise.  The contact information for the office is made available to the patient. Patient is agreeable to call 911 or go to the nearest ER should they begin having any SI/HI, or if any urgent concerns arise. No medication side effects or related complaints today.     MEDS ORDERED DURING VISIT:  New Medications Ordered This Visit   Medications    amphetamine-dextroamphetamine (Adderall) 30 MG tablet     Sig: Take 1 tablet by mouth Daily. Take at lunchtime     Dispense:  30 tablet     Refill:  0    amphetamine-dextroamphetamine XR (ADDERALL XR) 30 MG 24 hr capsule     Sig: Take 1 capsule by mouth Every Morning     Dispense:  30 capsule     Refill:  0    hydrOXYzine (ATARAX) 50 MG tablet     Sig: Take 1 tablet by mouth 2 (Two) Times a Day As Needed for Anxiety.     Dispense:  60 tablet     Refill:  0    PARoxetine (PAXIL) 40 MG tablet     Sig: Take 1 tablet by mouth Every Morning.      Dispense:  30 tablet     Refill:  0    topiramate (TOPAMAX) 100 MG tablet     Sig: Take 1 tablet by mouth 2 (Two) Times a Day.     Dispense:  60 tablet     Refill:  0        - Continue Adderall XR 30 mg by mouth every day in the morning and Adderall 30 mg by mouth at lunchtime for breakthrough ADHD symptoms.  - Continue Topamax  100 mg by mouth twice daily for mood.   --Continue hydroxyzine 25 mg PO PRN BID for anxiety.   - Continue Paxil 40 mg by mouth daily for mood.  - Continue Rexulti 2 mg by mouth daily for mood.  - Jimmy reviewed at this time and is appropriate.  - Discussed with patient possible side effects to include tachycardia, hypertension, cardiac death, anxiety, irritability, and mood fluctuations.  Patient verbalizes understanding and is aware to stop the medication and seek medical attention immediately if she begins to experience any of these symptoms.  - Encourage patient to go to nearest ED if SI/HI develop.       Follow Up Appointment:   No follow-ups on file.             This document has been electronically signed by KRISHNA Burgess  March 19, 2025 12:44 EDT         Dictated Utilizing Dragon Dictation: Part of this note may be an electronic transcription/translation of spoken language to printed text using the Dragon Dictation System.

## 2025-04-17 ENCOUNTER — TELEMEDICINE (OUTPATIENT)
Dept: PSYCHIATRY | Facility: CLINIC | Age: 45
End: 2025-04-17
Payer: COMMERCIAL

## 2025-04-17 DIAGNOSIS — F90.9 ADULT ADHD: ICD-10-CM

## 2025-04-17 DIAGNOSIS — F41.1 GENERALIZED ANXIETY DISORDER: ICD-10-CM

## 2025-04-17 DIAGNOSIS — F33.1 MAJOR DEPRESSIVE DISORDER, RECURRENT EPISODE, MODERATE: ICD-10-CM

## 2025-04-17 DIAGNOSIS — G47.19 EXCESSIVE DAYTIME SLEEPINESS: ICD-10-CM

## 2025-04-17 RX ORDER — HYDROXYZINE HYDROCHLORIDE 50 MG/1
50 TABLET, FILM COATED ORAL 2 TIMES DAILY PRN
Qty: 60 TABLET | Refills: 0 | Status: SHIPPED | OUTPATIENT
Start: 2025-04-17

## 2025-04-17 RX ORDER — TOPIRAMATE 100 MG/1
100 TABLET, FILM COATED ORAL 2 TIMES DAILY
Qty: 60 TABLET | Refills: 0 | Status: SHIPPED | OUTPATIENT
Start: 2025-04-17

## 2025-04-17 RX ORDER — DEXTROAMPHETAMINE SACCHARATE, AMPHETAMINE ASPARTATE, DEXTROAMPHETAMINE SULFATE AND AMPHETAMINE SULFATE 7.5; 7.5; 7.5; 7.5 MG/1; MG/1; MG/1; MG/1
30 TABLET ORAL DAILY
Qty: 30 TABLET | Refills: 0 | Status: SHIPPED | OUTPATIENT
Start: 2025-04-17 | End: 2026-04-17

## 2025-04-17 RX ORDER — DEXTROAMPHETAMINE SACCHARATE, AMPHETAMINE ASPARTATE MONOHYDRATE, DEXTROAMPHETAMINE SULFATE AND AMPHETAMINE SULFATE 7.5; 7.5; 7.5; 7.5 MG/1; MG/1; MG/1; MG/1
30 CAPSULE, EXTENDED RELEASE ORAL EVERY MORNING
Qty: 30 CAPSULE | Refills: 0 | Status: SHIPPED | OUTPATIENT
Start: 2025-04-17

## 2025-04-17 RX ORDER — PAROXETINE 40 MG/1
40 TABLET, FILM COATED ORAL EVERY MORNING
Qty: 30 TABLET | Refills: 0 | Status: SHIPPED | OUTPATIENT
Start: 2025-04-17

## 2025-04-17 NOTE — PROGRESS NOTES
This provider is located at the [facility] (through [facility] [place] [facility]), [address] [place] [address], [phone] using a secure [employer] Visit through [employer]. Patient is being seen remotely via telehealth at their home address in [place], and stated they are in a secure environment for this session. The patient's condition being diagnosed/treated is appropriate for telemedicine. The provider identified herself as well as her credentials.  The patient, and/or patients guardian, consent to be seen remotely, and when consent is given they understand that the consent allows for patient identifiable information to be sent to a third party as needed.   They may refuse to be seen remotely at any time. The electronic data is encrypted and password protected, and the patient and/or guardian has been advised of the potential risks to privacy not withstanding such measures.     You have chosen to receive care through a telehealth visit.  Do you consent to use a video/audio connection for your medical care today? Yes     Patient identifiers utilized: Name and date of birth.     Patient verbally confirmed consent for today's encounter: March 19, 2024    Subjective   Yuni Garcia is a 45 y.o. female who presents today for follow up    Chief Complaint:  ADHD, depression,    History of Present Illness:    History of Present Illness  Yuni is a 45-year-old female who presents today for a follow-up visit via telehealth.  She feels like her current medication regimen has been working okay.  She does state that she sometimes struggles falling asleep.  Also states that she has been picking at her skin more than she had been.  States that she has been taking the hydroxyzine and it has not been effective.  She is back to working 3 shifts per week.  Denies having any side effects of the medications including chest pain or other cardiovascular concerns.  No EPS noted.  No SI/HI/AVH.    The following portions of the patient's history  were reviewed and updated as appropriate: allergies, current medications, past family history, past medical history, past social history, past surgical history and problem list.     Past Psychiatric History:  Began Treatment: Several years ago.  She has been primarily treated from her primary care provider but has seen two PMHNP's for 1 visit each.  Diagnoses:Depression, Anxiety, and OCD, insomnia  Psychiatrist: Has seen Kandis Sneed and Flower Benoit here in the past.  Therapist:Denies  Admission History:Denies  Medication Trials: zoloft, lexapro, celexa, effexor, cymbalta, prozac, wellbutrin, vraylar, caplyta, abilify, trazodone, Concerta, Vyvanse, Adderall, Adderall XR  Self Harm: Denies  Suicide Attempts:Denies   Psychosis, Anxiety, Depression: Denies    Past Medical History:  Past Medical History:   Diagnosis Date    ADHD (attention deficit hyperactivity disorder)     Depression     HTN (hypertension)      Substance Abuse History:   Denies    Social History:  Social History     Socioeconomic History    Marital status:    Tobacco Use    Smoking status: Never    Smokeless tobacco: Never   Vaping Use    Vaping status: Never Used   Substance and Sexual Activity    Alcohol use: Never    Drug use: Never    Sexual activity: Defer     Family History:  Family History   Problem Relation Age of Onset    Diabetes Mother     Heart disease Father      Past Surgical History:  Past Surgical History:   Procedure Laterality Date    BARIATRIC SURGERY      BREAST SURGERY      lift    GALLBLADDER SURGERY      STOMACH SURGERY      tummy tuck    TUBAL ABDOMINAL LIGATION         Problem List:  Patient Active Problem List   Diagnosis    Abdominal wall bulge       Allergy:   Allergies   Allergen Reactions    Latex Hives    Bactrim [Sulfamethoxazole-Trimethoprim] Rash        Current Medications:   Current Outpatient Medications   Medication Sig Dispense Refill    amphetamine-dextroamphetamine (Adderall) 30 MG tablet Take  1 tablet by mouth Daily. Take at lunchtime 30 tablet 0    amphetamine-dextroamphetamine XR (ADDERALL XR) 30 MG 24 hr capsule Take 1 capsule by mouth Every Morning 30 capsule 0    Brexpiprazole 2 MG tablet Take 1 tablet by mouth Daily. 30 tablet 2    hydrOXYzine (ATARAX) 50 MG tablet Take 1 tablet by mouth 2 (Two) Times a Day As Needed for Anxiety. 60 tablet 0    PARoxetine (PAXIL) 40 MG tablet Take 1 tablet by mouth Every Morning. 30 tablet 0    topiramate (TOPAMAX) 100 MG tablet Take 1 tablet by mouth 2 (Two) Times a Day. 60 tablet 0    amitriptyline (ELAVIL) 25 MG tablet Take 1 tablet by mouth Every Night. 30 tablet 0    amoxicillin (AMOXIL) 500 MG capsule Take 1 capsule by mouth 2 (Two) Times a Day.      benazepril (LOTENSIN) 10 MG tablet Take 1 tablet by mouth Daily.      cyanocobalamin 1000 MCG/ML injection       Diclofenac Sodium (VOLTAREN) 1 % gel gel diclofenac 1 % topical gel      EQ Aspirin Adult Low Dose 81 MG EC tablet Take 1 tablet by mouth Daily.      esomeprazole (nexIUM) 40 MG capsule Take 1 capsule by mouth Daily.      ferrous sulfate 324 (65 Fe) MG tablet delayed-release EC tablet Take 1 tablet by mouth Daily.      fexofenadine (ALLEGRA) 180 MG tablet Take 1 tablet by mouth Daily.      fluconazole (DIFLUCAN) 150 MG tablet TAKE 1 TABLET BY MOUTH ONCE DAILY FOR 3 DAYS      fluticasone (FLONASE) 50 MCG/ACT nasal spray       furosemide (LASIX) 40 MG tablet Take 1 tablet by mouth Daily.      HYDROcodone-acetaminophen (NORCO) 7.5-325 MG per tablet Take 1 tablet by mouth Every 8 (Eight) Hours As Needed. for pain      levothyroxine (SYNTHROID, LEVOTHROID) 150 MCG tablet Take 1 tablet by mouth Daily.      methocarbamol (ROBAXIN) 750 MG tablet TAKE 1 TABLET BY MOUTH 4 TIMES DAILY AS NEEDED      nitroglycerin (NITROSTAT) 0.4 MG SL tablet Place 1 tablet under the tongue Every 5 (Five) Minutes As Needed for Chest Pain. do not exceed a total of 3 doses in 15 minutes      phenazopyridine (PYRIDIUM) 100 MG tablet  TAKE 1 TABLET BY MOUTH THREE TIMES DAILY AFTER A MEAL AS NEEDED      potassium chloride 10 MEQ CR tablet TAKE 3 TABLETS BY MOUTH TWICE DAILY WITH FOOD      pregabalin (LYRICA) 50 MG capsule Take 3 capsules by mouth Every 12 (Twelve) Hours.      valACYclovir (VALTREX) 500 MG tablet Take 1 tablet by mouth Daily.      vitamin B-12 (CYANOCOBALAMIN) 1000 MCG tablet Take 1 tablet by mouth 1 (One) Time Per Week.      vitamin D (ERGOCALCIFEROL) 1.25 MG (93746 UT) capsule capsule Take 1 capsule by mouth 1 (One) Time Per Week.       No current facility-administered medications for this visit.       Review of Systems:    Review of Systems   Constitutional:  Negative for fatigue and unexpected weight gain.   Respiratory: Negative.     Cardiovascular: Negative.    Neurological:  Negative for headache.   Psychiatric/Behavioral:  Positive for sleep disturbance. Negative for agitation, behavioral problems, decreased concentration, dysphoric mood, hallucinations, self-injury, suicidal ideas, negative for hyperactivity, depressed mood and stress. The patient is nervous/anxious.        Physical Exam:   Physical Exam  Vitals reviewed.   Constitutional:       Appearance: Normal appearance.   Pulmonary:      Effort: Pulmonary effort is normal.   Neurological:      Mental Status: She is alert and oriented to person, place, and time. Mental status is at baseline.   Psychiatric:         Attention and Perception: Attention and perception normal. She is attentive.         Mood and Affect: Mood is not depressed. Affect is not flat or tearful.         Speech: Speech normal.         Behavior: Behavior normal. Behavior is cooperative.         Thought Content: Thought content normal.         Cognition and Memory: Cognition normal. Memory is impaired.         Judgment: Judgment normal.       Vitals:  There were no vitals taken for this visit. There is no height or weight on file to calculate BMI.    Last 3 Blood Pressure Readings:  BP Readings from  Last 3 Encounters:   01/22/25 110/70   09/25/24 124/82   03/08/24 128/79       Mental Status Exam:   Hygiene:   good  Cooperation:  Cooperative  Eye Contact:  Good  Psychomotor Behavior:  Appropriate  Affect:  Full range  Mood: normal  Hopelessness: 0  Speech:  Normal  Thought Process:  Linear  Thought Content:  Normal  Suicidal:  None  Homicidal:  None  Hallucinations:  None  Delusion:  None  Memory:  Intact  Orientation:  Person, Place, Time, and Situation  Reliability:  good  Insight:  Good  Judgement:  Fair  Impulse Control:  Fair  Physical/Medical Issues:  Yes see past medical history       Lab Results:   Office Visit on 01/22/2025   Component Date Value Ref Range Status    Amphetamine Screen, Urine 01/22/2025 Positive (A)  Negative Final    PRELIMINARY RESULTS. P[facility] RE[facility] FOR FINAL RESULTS.    Barbiturates Screen, Urine 01/22/2025 Negative  Negative Final    Buprenorphine, Screen, Urine 01/22/2025 Negative  Negative Final    Benzodiazepine Screen, Urine 01/22/2025 Negative  Negative Final    Cocaine Screen, Urine 01/22/2025 Negative  Negative Final    MDMA (ECSTASY) 01/22/2025 Negative  Negative Final    Methamphetamine, Ur 01/22/2025 Negative  Negative Final    Methadone Screen, Urine 01/22/2025 Negative  Negative Final    Morphine/Opiates Screen, Urine 01/22/2025 Positive (A)  Negative Final    Oxycodone Screen, Urine 01/22/2025 Negative  Negative Final    Phencyclidine (PCP), Urine 01/22/2025 Negative  Negative Final    Propoxyphene Scree, Urine 01/22/2025 Negative  Negative Final    Tricyclic Antidepressants Screen 01/22/2025 Negative  Negative Final    THC, Screen, Urine 01/22/2025 Negative  Negative Final    Gabapentin 01/22/2025 NEGATIVE   Final    ETHANOL URINE SCREEN 01/22/2025 Negative   Final    Fentanyl, Urine 01/22/2025 Negative  Negative Final       Assessment & Plan   Diagnoses and all orders for this visit:    1. Adult ADHD  -     amphetamine-dextroamphetamine  (Adderall) 30 MG tablet; Take 1 tablet by mouth Daily. Take at lunchtime  Dispense: 30 tablet; Refill: 0  -     amphetamine-dextroamphetamine XR (ADDERALL XR) 30 MG 24 hr capsule; Take 1 capsule by mouth Every Morning  Dispense: 30 capsule; Refill: 0    2. Excessive daytime sleepiness  -     amphetamine-dextroamphetamine (Adderall) 30 MG tablet; Take 1 tablet by mouth Daily. Take at lunchtime  Dispense: 30 tablet; Refill: 0  -     amphetamine-dextroamphetamine XR (ADDERALL XR) 30 MG 24 hr capsule; Take 1 capsule by mouth Every Morning  Dispense: 30 capsule; Refill: 0    3. Major depressive disorder, recurrent episode, moderate  -     Brexpiprazole 2 MG tablet; Take 1 tablet by mouth Daily.  Dispense: 30 tablet; Refill: 2  -     PARoxetine (PAXIL) 40 MG tablet; Take 1 tablet by mouth Every Morning.  Dispense: 30 tablet; Refill: 0  -     topiramate (TOPAMAX) 100 MG tablet; Take 1 tablet by mouth 2 (Two) Times a Day.  Dispense: 60 tablet; Refill: 0    4. Generalized anxiety disorder  -     Brexpiprazole 2 MG tablet; Take 1 tablet by mouth Daily.  Dispense: 30 tablet; Refill: 2  -     hydrOXYzine (ATARAX) 50 MG tablet; Take 1 tablet by mouth 2 (Two) Times a Day As Needed for Anxiety.  Dispense: 60 tablet; Refill: 0  -     PARoxetine (PAXIL) 40 MG tablet; Take 1 tablet by mouth Every Morning.  Dispense: 30 tablet; Refill: 0  -     topiramate (TOPAMAX) 100 MG tablet; Take 1 tablet by mouth 2 (Two) Times a Day.  Dispense: 60 tablet; Refill: 0    Other orders  -     amitriptyline (ELAVIL) 25 MG tablet; Take 1 tablet by mouth Every Night.  Dispense: 30 tablet; Refill: 0                  Visit Diagnoses:    ICD-10-CM ICD-9-CM   1. Adult ADHD  F90.9 314.01   2. Excessive daytime sleepiness  G47.19 780.54   3. Major depressive disorder, recurrent episode, moderate  F33.1 296.32   4. Generalized anxiety disorder  F41.1 300.02               GOALS:  Short Term Goals: Patient will be compliant with medication, and patient will have  no significant medication related side effects.  Patient will be engaged in psychotherapy as indicated.  Patient will report subjective improvement of symptoms.  Long term goals: To stabilize mood and treat/improve subjective symptoms, the patient will stay out of the hospital, the patient will be at an optimal level of functioning, and the patient will take all medications as prescribed.  The patient/guardian verbalized understanding and agreement with goals that were mutually set.      TREATMENT PLAN: Continue supportive psychotherapy efforts and medications as indicated.  Pharmacological and Non-Pharmacological treatment options discussed during today's visit. Patient/Guardian acknowledged and verbally consented with current treatment plan and was educated on the importance of compliance with treatment and follow-up appointments.      MEDICATION ISSUES:  Discussed medication options and treatment plan of prescribed medication as well as the risks, benefits, any black box warnings, and side effects including potential falls, possible impaired driving, and metabolic adversities among others. Patient is agreeable to call the office with any worsening of symptoms or onset of side effects, or if any concerns or questions arise.  The contact information for the office is made available to the patient. Patient is agreeable to call 911 or go to the nearest ER should they begin having any SI/HI, or if any urgent concerns arise. No medication side effects or related complaints today.     MEDS ORDERED DURING VISIT:  New Medications Ordered This Visit   Medications    amitriptyline (ELAVIL) 25 MG tablet     Sig: Take 1 tablet by mouth Every Night.     Dispense:  30 tablet     Refill:  0    amphetamine-dextroamphetamine (Adderall) 30 MG tablet     Sig: Take 1 tablet by mouth Daily. Take at lunchtime     Dispense:  30 tablet     Refill:  0    amphetamine-dextroamphetamine XR (ADDERALL XR) 30 MG 24 hr capsule     Sig: Take 1  capsule by mouth Every Morning     Dispense:  30 capsule     Refill:  0    Brexpiprazole 2 MG tablet     Sig: Take 1 tablet by mouth Daily.     Dispense:  30 tablet     Refill:  2    hydrOXYzine (ATARAX) 50 MG tablet     Sig: Take 1 tablet by mouth 2 (Two) Times a Day As Needed for Anxiety.     Dispense:  60 tablet     Refill:  0    PARoxetine (PAXIL) 40 MG tablet     Sig: Take 1 tablet by mouth Every Morning.     Dispense:  30 tablet     Refill:  0    topiramate (TOPAMAX) 100 MG tablet     Sig: Take 1 tablet by mouth 2 (Two) Times a Day.     Dispense:  60 tablet     Refill:  0        - Continue Adderall XR 30 mg by mouth every day in the morning and Adderall 30 mg by mouth at lunchtime for breakthrough ADHD symptoms.  - Continue Topamax  100 mg by mouth twice daily for mood.   --Continue hydroxyzine 25 mg PO PRN BID for anxiety.   - Continue Paxil 40 mg by mouth daily for mood.  - Continue Rexulti 2 mg by mouth daily for mood.  -Start a low dose of Elavil, 25 mg, every night for sleep/anxiety.  Discussed with patient the potential for serotonin syndrome and elevated levels of the Elavil while taking it concurrently with Paxil.  - Jimmy reviewed at this time and is appropriate.  - Discussed with patient possible side effects to include tachycardia, hypertension, cardiac death, anxiety, irritability, and mood fluctuations.  Patient verbalizes understanding and is aware to stop the medication and seek medical attention immediately if she begins to experience any of these symptoms.  - Encourage patient to go to nearest ED if SI/HI develop.       Follow Up Appointment:   Return in about 4 weeks (around 5/15/2025).             This document has been electronically signed by KRISHNA Burgess  April 17, 2025 14:44 EDT         Dictated Utilizing Dragon Dictation: Part of this note may be an electronic transcription/translation of spoken language to printed text using the Dragon Dictation System.

## 2025-05-15 ENCOUNTER — TELEMEDICINE (OUTPATIENT)
Dept: PSYCHIATRY | Facility: CLINIC | Age: 45
End: 2025-05-15
Payer: COMMERCIAL

## 2025-05-15 DIAGNOSIS — F33.1 MAJOR DEPRESSIVE DISORDER, RECURRENT EPISODE, MODERATE: ICD-10-CM

## 2025-05-15 DIAGNOSIS — G47.19 EXCESSIVE DAYTIME SLEEPINESS: ICD-10-CM

## 2025-05-15 DIAGNOSIS — F41.1 GENERALIZED ANXIETY DISORDER: ICD-10-CM

## 2025-05-15 DIAGNOSIS — F90.9 ADULT ADHD: ICD-10-CM

## 2025-05-15 RX ORDER — TOPIRAMATE 100 MG/1
100 TABLET, FILM COATED ORAL 2 TIMES DAILY
Qty: 60 TABLET | Refills: 0 | Status: SHIPPED | OUTPATIENT
Start: 2025-05-15

## 2025-05-15 RX ORDER — DEXTROAMPHETAMINE SACCHARATE, AMPHETAMINE ASPARTATE, DEXTROAMPHETAMINE SULFATE AND AMPHETAMINE SULFATE 7.5; 7.5; 7.5; 7.5 MG/1; MG/1; MG/1; MG/1
30 TABLET ORAL DAILY
Qty: 30 TABLET | Refills: 0 | Status: SHIPPED | OUTPATIENT
Start: 2025-05-15 | End: 2026-05-15

## 2025-05-15 RX ORDER — PAROXETINE 40 MG/1
40 TABLET, FILM COATED ORAL EVERY MORNING
Qty: 30 TABLET | Refills: 0 | Status: SHIPPED | OUTPATIENT
Start: 2025-05-15

## 2025-05-15 RX ORDER — DEXTROAMPHETAMINE SACCHARATE, AMPHETAMINE ASPARTATE MONOHYDRATE, DEXTROAMPHETAMINE SULFATE AND AMPHETAMINE SULFATE 7.5; 7.5; 7.5; 7.5 MG/1; MG/1; MG/1; MG/1
30 CAPSULE, EXTENDED RELEASE ORAL EVERY MORNING
Qty: 30 CAPSULE | Refills: 0 | Status: SHIPPED | OUTPATIENT
Start: 2025-05-15

## 2025-05-15 NOTE — PROGRESS NOTES
This provider is located at the [facility] (through [facility]), [address] [place] [address], [id number] using a secure [employer] Video Visit through [employer]. Patient is being seen remotely via telehealth at their home address in [place], and stated they are in a secure environment for this session. The patient's condition being diagnosed/treated is appropriate for telemedicine. The provider identified herself as well as her credentials.  The patient, and/or patients guardian, consent to be seen remotely, and when consent is given they understand that the consent allows for patient identifiable information to be sent to a third party as needed.   They may refuse to be seen remotely at any time. The electronic data is encrypted and password protected, and the patient and/or guardian has been advised of the potential risks to privacy not withstanding such measures.     You have chosen to receive care through a telehealth visit.  Do you consent to use a video/audio connection for your medical care today? Yes     Patient identifiers utilized: Name and date of birth.     Patient verbally confirmed consent for today's encounter: March 19, 2024    Subjective   Yuni Garcia is a 45 y.o. female who presents today for follow up    Chief Complaint:  ADHD, depression,    History of Present Illness:    History of Present Illness  Yuni is a 45-year-old female who presents today for a follow-up visit via telehealth.  States that she had a good Mother's Day and got to see both of her sons and her mother.  States that her sons bought her a new stethoscope.  She feels like her current medication regimen has been working okay that she has started to feel more down and not interested in doing things.  She does state that she sometimes struggles falling asleep and denies any difference when taking amitriptyline.  Dates she feels like the melatonin works better.  States that she has also had some mood swings and has been struggling  with stress.  States that sometimes while at work she feels like she is on the verge of having another stroke.  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  Focus and concentration are adequate while at work.  Anxiety has been manageable.  Denies having any side effects of the medications including chest pain or other cardiovascular concerns.  No EPS noted.  No SI/HI/AVH.      The following portions of the patient's history were reviewed and updated as appropriate: allergies, current medications, past family history, past medical history, past social history, past surgical history and problem list.     Past Psychiatric History:  Began Treatment: Several years ago.  She has been primarily treated from her primary care provider but has seen two PMHNP's for 1 visit each.  Diagnoses:Depression, Anxiety, and OCD, insomnia  Psychiatrist: Has seen Kandis Sneed and Flower Benoit here in the past.  Therapist:Denies  Admission History:Denies  Medication Trials: zoloft, lexapro, celexa, effexor, cymbalta, prozac, wellbutrin, vraylar, caplyta, abilify, trazodone, Concerta, Vyvanse, Adderall, Adderall XR  Self Harm: Denies  Suicide Attempts:Denies   Psychosis, Anxiety, Depression: Denies    Past Medical History:  Past Medical History:   Diagnosis Date    ADHD (attention deficit hyperactivity disorder)     Depression     HTN (hypertension)      Substance Abuse History:   Denies    Social History:  Social History     Socioeconomic History    Marital status:    Tobacco Use    Smoking status: Never    Smokeless tobacco: Never   Vaping Use    Vaping status: Never Used   Substance and Sexual Activity    Alcohol use: Never    Drug use: Never    Sexual activity: Defer     Family History:  Family History   Problem Relation Age of Onset    Diabetes Mother     Heart disease Father      Past Surgical History:  Past Surgical History:   Procedure  Laterality Date    BARIATRIC SURGERY      BREAST SURGERY      lift    GALLBLADDER SURGERY      STOMACH SURGERY      tummy tuck    TUBAL ABDOMINAL LIGATION         Problem List:  Patient Active Problem List   Diagnosis    Abdominal wall bulge       Allergy:   Allergies   Allergen Reactions    Latex Hives    Bactrim [Sulfamethoxazole-Trimethoprim] Rash        Current Medications:   Current Outpatient Medications   Medication Sig Dispense Refill    amoxicillin (AMOXIL) 500 MG capsule Take 1 capsule by mouth 2 (Two) Times a Day.      amphetamine-dextroamphetamine (Adderall) 30 MG tablet Take 1 tablet by mouth Daily. Take at lunchtime 30 tablet 0    amphetamine-dextroamphetamine XR (ADDERALL XR) 30 MG 24 hr capsule Take 1 capsule by mouth Every Morning 30 capsule 0    benazepril (LOTENSIN) 10 MG tablet Take 1 tablet by mouth Daily.      Brexpiprazole 2 MG tablet Take 1 tablet by mouth Daily. 30 tablet 2    cyanocobalamin 1000 MCG/ML injection       Diclofenac Sodium (VOLTAREN) 1 % gel gel diclofenac 1 % topical gel      EQ Aspirin Adult Low Dose 81 MG EC tablet Take 1 tablet by mouth Daily.      esomeprazole (nexIUM) 40 MG capsule Take 1 capsule by mouth Daily.      ferrous sulfate 324 (65 Fe) MG tablet delayed-release EC tablet Take 1 tablet by mouth Daily.      fexofenadine (ALLEGRA) 180 MG tablet Take 1 tablet by mouth Daily.      fluconazole (DIFLUCAN) 150 MG tablet TAKE 1 TABLET BY MOUTH ONCE DAILY FOR 3 DAYS      fluticasone (FLONASE) 50 MCG/ACT nasal spray       furosemide (LASIX) 40 MG tablet Take 1 tablet by mouth Daily.      HYDROcodone-acetaminophen (NORCO) 7.5-325 MG per tablet Take 1 tablet by mouth Every 8 (Eight) Hours As Needed. for pain      hydrOXYzine (ATARAX) 50 MG tablet Take 1 tablet by mouth 2 (Two) Times a Day As Needed for Anxiety. 60 tablet 0    levothyroxine (SYNTHROID, LEVOTHROID) 150 MCG tablet Take 1 tablet by mouth Daily.      methocarbamol (ROBAXIN) 750 MG tablet TAKE 1 TABLET BY MOUTH 4  TIMES DAILY AS NEEDED      nitroglycerin (NITROSTAT) 0.4 MG SL tablet Place 1 tablet under the tongue Every 5 (Five) Minutes As Needed for Chest Pain. do not exceed a total of 3 doses in 15 minutes      PARoxetine (PAXIL) 40 MG tablet Take 1 tablet by mouth Every Morning. 30 tablet 0    phenazopyridine (PYRIDIUM) 100 MG tablet TAKE 1 TABLET BY MOUTH THREE TIMES DAILY AFTER A MEAL AS NEEDED      potassium chloride 10 MEQ CR tablet TAKE 3 TABLETS BY MOUTH TWICE DAILY WITH FOOD      pregabalin (LYRICA) 50 MG capsule Take 3 capsules by mouth Every 12 (Twelve) Hours.      topiramate (TOPAMAX) 100 MG tablet Take 1 tablet by mouth 2 (Two) Times a Day. 60 tablet 0    valACYclovir (VALTREX) 500 MG tablet Take 1 tablet by mouth Daily.      vitamin B-12 (CYANOCOBALAMIN) 1000 MCG tablet Take 1 tablet by mouth 1 (One) Time Per Week.      vitamin D (ERGOCALCIFEROL) 1.25 MG (59369 UT) capsule capsule Take 1 capsule by mouth 1 (One) Time Per Week.       No current facility-administered medications for this visit.       Review of Systems:    Review of Systems   Constitutional:  Positive for activity change and fatigue. Negative for unexpected weight gain.   Respiratory: Negative.     Cardiovascular: Negative.    Neurological:  Negative for headache.   Psychiatric/Behavioral:  Positive for sleep disturbance, depressed mood and stress. Negative for agitation, behavioral problems, decreased concentration, dysphoric mood, hallucinations, self-injury, suicidal ideas and negative for hyperactivity. The patient is nervous/anxious.        Physical Exam:   Physical Exam  Vitals reviewed.   Constitutional:       Appearance: Normal appearance.   Pulmonary:      Effort: Pulmonary effort is normal.   Neurological:      Mental Status: She is alert and oriented to person, place, and time. Mental status is at baseline.   Psychiatric:         Attention and Perception: Attention and perception normal. She is attentive.         Mood and Affect: Mood  is depressed. Affect is not flat or tearful.         Speech: Speech normal.         Behavior: Behavior normal. Behavior is cooperative.         Thought Content: Thought content normal.         Cognition and Memory: Cognition normal. Memory is impaired.         Judgment: Judgment normal.       Vitals:  There were no vitals taken for this visit. There is no height or weight on file to calculate BMI.    Last 3 Blood Pressure Readings:  BP Readings from Last 3 Encounters:   01/22/25 110/70   09/25/24 124/82   03/08/24 128/79       Mental Status Exam:   Hygiene:   good  Cooperation:  Cooperative  Eye Contact:  Good  Psychomotor Behavior:  Appropriate  Affect:  Full range  Mood: normal  Hopelessness: 0  Speech:  Normal  Thought Process:  Linear  Thought Content:  Normal  Suicidal:  None  Homicidal:  None  Hallucinations:  None  Delusion:  None  Memory:  Intact  Orientation:  Person, Place, Time, and Situation  Reliability:  good  Insight:  Good  Judgement:  Fair  Impulse Control:  Fair  Physical/Medical Issues:  Yes see past medical history       Lab Results:   No visits with results within 3 Month(s) from this visit.   Latest known visit with results is:   Office Visit on 01/22/2025   Component Date Value Ref Range Status    Amphetamine Screen, Urine 01/22/2025 Positive (A)  Negative Final    PRELIMINARY RESULTS. PLEASE REFER TO THE [facility] LAB CONFIRMATION FOR FINAL RESULTS.    Barbiturates Screen, Urine 01/22/2025 Negative  Negative Final    Buprenorphine, Screen, Urine 01/22/2025 Negative  Negative Final    Benzodiazepine Screen, Urine 01/22/2025 Negative  Negative Final    Cocaine Screen, Urine 01/22/2025 Negative  Negative Final    MDMA (ECSTASY) 01/22/2025 Negative  Negative Final    Methamphetamine, Ur 01/22/2025 Negative  Negative Final    Methadone Screen, Urine 01/22/2025 Negative  Negative Final    Morphine/Opiates Screen, Urine 01/22/2025 Positive (A)  Negative Final    Oxycodone Screen, Urine 01/22/2025  Negative  Negative Final    Phencyclidine (PCP), Urine 01/22/2025 Negative  Negative Final    Propoxyphene Scree, Urine 01/22/2025 Negative  Negative Final    Tricyclic Antidepressants Screen 01/22/2025 Negative  Negative Final    THC, Screen, Urine 01/22/2025 Negative  Negative Final    Gabapentin 01/22/2025 NEGATIVE   Final    ETHANOL URINE SCREEN 01/22/2025 Negative   Final    Fentanyl, Urine 01/22/2025 Negative  Negative Final       Assessment & Plan   Diagnoses and all orders for this visit:    1. Adult ADHD    2. Excessive daytime sleepiness    3. Major depressive disorder, recurrent episode, moderate    4. Generalized anxiety disorder          Visit Diagnoses:    ICD-10-CM ICD-9-CM   1. Adult ADHD  F90.9 314.01   2. Excessive daytime sleepiness  G47.19 780.54   3. Major depressive disorder, recurrent episode, moderate  F33.1 296.32   4. Generalized anxiety disorder  F41.1 300.02         GOALS:  Short Term Goals: Patient will be compliant with medication, and patient will have no significant medication related side effects.  Patient will be engaged in psychotherapy as indicated.  Patient will report subjective improvement of symptoms.  Long term goals: To stabilize mood and treat/improve subjective symptoms, the patient will stay out of the hospital, the patient will be at an optimal level of functioning, and the patient will take all medications as prescribed.  The patient/guardian verbalized understanding and agreement with goals that were mutually set.      TREATMENT PLAN: Continue supportive psychotherapy efforts and medications as indicated.  Pharmacological and Non-Pharmacological treatment options discussed during today's visit. Patient/Guardian acknowledged and verbally consented with current treatment plan and was educated on the importance of compliance with treatment and follow-up appointments.      MEDICATION ISSUES:  Discussed medication options and treatment plan of prescribed medication as well  as the risks, benefits, any black box warnings, and side effects including potential falls, possible impaired driving, and metabolic adversities among others. Patient is agreeable to call the office with any worsening of symptoms or onset of side effects, or if any concerns or questions arise.  The contact information for the office is made available to the patient. Patient is agreeable to call 911 or go to the nearest ER should they begin having any SI/HI, or if any urgent concerns arise. No medication side effects or related complaints today.     MEDS ORDERED DURING VISIT:  No orders of the defined types were placed in this encounter.       - Continue Adderall XR 30 mg by mouth every day in the morning and Adderall 30 mg by mouth at lunchtime for breakthrough ADHD symptoms.  - Continue Topamax  100 mg by mouth twice daily for mood.   --Continue hydroxyzine 25 mg PO PRN BID for anxiety.   - Continue Paxil 40 mg by mouth daily for mood.  - Continue Rexulti 2 mg by mouth daily for mood.  - Feels like melatonin has worked better for sleep than the Elavil.  We will send in melatonin 10 mg by mouth every night as needed for sleep.  - Resumed taking Vraylar 3 mg as patient had responded well to this in the past.  - Jimmy reviewed at this time and is appropriate.  - Discussed with patient possible side effects to include tachycardia, hypertension, cardiac death, anxiety, irritability, and mood fluctuations.  Patient verbalizes understanding and is aware to stop the medication and seek medical attention immediately if she begins to experience any of these symptoms.  - Encourage patient to go to nearest ED if SI/HI develop.       Follow Up Appointment:   No follow-ups on file.             This document has been electronically signed by KRISHNA Burgess  May 15, 2025 13:39 EDT         Dictated Utilizing Dragon Dictation: Part of this note may be an electronic transcription/translation of spoken language to printed text  using the Dragon Dictation System.

## 2025-06-17 ENCOUNTER — TELEMEDICINE (OUTPATIENT)
Dept: PSYCHIATRY | Facility: CLINIC | Age: 45
End: 2025-06-17
Payer: COMMERCIAL

## 2025-06-17 ENCOUNTER — PRIOR AUTHORIZATION (OUTPATIENT)
Dept: PSYCHIATRY | Facility: CLINIC | Age: 45
End: 2025-06-17

## 2025-06-17 DIAGNOSIS — F33.1 MAJOR DEPRESSIVE DISORDER, RECURRENT EPISODE, MODERATE: ICD-10-CM

## 2025-06-17 DIAGNOSIS — G47.00 INSOMNIA, UNSPECIFIED TYPE: Primary | ICD-10-CM

## 2025-06-17 DIAGNOSIS — F90.9 ADULT ADHD: ICD-10-CM

## 2025-06-17 DIAGNOSIS — F41.1 GENERALIZED ANXIETY DISORDER: ICD-10-CM

## 2025-06-17 DIAGNOSIS — G47.19 EXCESSIVE DAYTIME SLEEPINESS: ICD-10-CM

## 2025-06-17 RX ORDER — TOPIRAMATE 100 MG/1
100 TABLET, FILM COATED ORAL 2 TIMES DAILY
Qty: 60 TABLET | Refills: 0 | Status: SHIPPED | OUTPATIENT
Start: 2025-06-17

## 2025-06-17 RX ORDER — DEXTROAMPHETAMINE SACCHARATE, AMPHETAMINE ASPARTATE, DEXTROAMPHETAMINE SULFATE AND AMPHETAMINE SULFATE 7.5; 7.5; 7.5; 7.5 MG/1; MG/1; MG/1; MG/1
30 TABLET ORAL DAILY
Qty: 30 TABLET | Refills: 0 | Status: SHIPPED | OUTPATIENT
Start: 2025-06-17 | End: 2026-06-17

## 2025-06-17 RX ORDER — PAROXETINE 40 MG/1
40 TABLET, FILM COATED ORAL EVERY MORNING
Qty: 30 TABLET | Refills: 0 | Status: SHIPPED | OUTPATIENT
Start: 2025-06-17

## 2025-06-17 RX ORDER — DEXTROAMPHETAMINE SACCHARATE, AMPHETAMINE ASPARTATE MONOHYDRATE, DEXTROAMPHETAMINE SULFATE AND AMPHETAMINE SULFATE 7.5; 7.5; 7.5; 7.5 MG/1; MG/1; MG/1; MG/1
30 CAPSULE, EXTENDED RELEASE ORAL EVERY MORNING
Qty: 30 CAPSULE | Refills: 0 | Status: SHIPPED | OUTPATIENT
Start: 2025-06-17

## 2025-06-17 NOTE — PROGRESS NOTES
This provider is located at the [facility] (through [facility]), [address] [place] [address], [id number] using a secure [employer] Video Visit through [employer]. Patient is being seen remotely via telehealth at their home address in [place], and stated they are in a secure environment for this session. The patient's condition being diagnosed/treated is appropriate for telemedicine. The provider identified herself as well as her credentials.  The patient, and/or patients guardian, consent to be seen remotely, and when consent is given they understand that the consent allows for patient identifiable information to be sent to a third party as needed.   They may refuse to be seen remotely at any time. The electronic data is encrypted and password protected, and the patient and/or guardian has been advised of the potential risks to privacy not withstanding such measures.     You have chosen to receive care through a telehealth visit.  Do you consent to use a video/audio connection for your medical care today? Yes     Patient identifiers utilized: Name and date of birth.     Patient verbally confirmed consent for today's encounter: March 19, 2024    Subjective   Yuni Garcia is a 45 y.o. female who presents today for follow up    Chief Complaint:  ADHD, depression,    History of Present Illness:    History of Present Illness  Yuni is a 45-year-old female who presents today for a follow-up visit via telehealth.  States that she has felt a little more down lately and is feeling anxious while at work.  States that she is also struggling to get an adequate amount of sleep.  States that she is fatigued a lot during the day but then struggles to sleep at night.  She has tried medications such as amitriptyline, Seroquel, and Vistaril for sleep but without any changes.  She has also taken over-the-counter medications like Benadryl and melatonin.  States that work continues to be very stressful and this sometimes keeps her up  thinking about what could happen.  Denies any side effects of the medications.  Denies any cardiovascular concerns including chest pain or palpitations.  No EPS noted.  No SI/HI/AVH.        The following portions of the patient's history were reviewed and updated as appropriate: allergies, current medications, past family history, past medical history, past social history, past surgical history and problem list.     Past Psychiatric History:  Began Treatment: Several years ago.  She has been primarily treated from her primary care provider but has seen two PMHNP's for 1 visit each.  Diagnoses:Depression, Anxiety, and OCD, insomnia  Psychiatrist: Has seen Kandis Sneed and Flower Benoit here in the past.  Therapist:Denies  Admission History:Denies  Medication Trials: zoloft, lexapro, celexa, effexor, cymbalta, prozac, wellbutrin, vraylar, caplyta, abilify, trazodone, Concerta, Vyvanse, Adderall, Adderall XR, elavil, seroquel,   Self Harm: Denies  Suicide Attempts:Denies   Psychosis, Anxiety, Depression: Denies    Past Medical History:  Past Medical History:   Diagnosis Date    ADHD (attention deficit hyperactivity disorder)     Depression     HTN (hypertension)      Substance Abuse History:   Denies    Social History:  Social History     Socioeconomic History    Marital status:    Tobacco Use    Smoking status: Never    Smokeless tobacco: Never   Vaping Use    Vaping status: Never Used   Substance and Sexual Activity    Alcohol use: Never    Drug use: Never    Sexual activity: Defer     Family History:  Family History   Problem Relation Age of Onset    Diabetes Mother     Heart disease Father      Past Surgical History:  Past Surgical History:   Procedure Laterality Date    BARIATRIC SURGERY      BREAST SURGERY      lift    GALLBLADDER SURGERY      STOMACH SURGERY      tummy tuck    TUBAL ABDOMINAL LIGATION         Problem List:  Patient Active Problem List   Diagnosis    Abdominal wall bulge        Allergy:   Allergies   Allergen Reactions    Latex Hives    Bactrim [Sulfamethoxazole-Trimethoprim] Rash        Current Medications:   Current Outpatient Medications   Medication Sig Dispense Refill    amphetamine-dextroamphetamine (Adderall) 30 MG tablet Take 1 tablet by mouth Daily. Take at lunchtime 30 tablet 0    amphetamine-dextroamphetamine XR (ADDERALL XR) 30 MG 24 hr capsule Take 1 capsule by mouth Every Morning 30 capsule 0    Brexpiprazole 2 MG tablet Take 1 tablet by mouth Daily. 30 tablet 2    Cariprazine HCl (Vraylar) 4.5 MG capsule capsule Take 1 capsule by mouth Daily. 30 capsule 0    PARoxetine (PAXIL) 40 MG tablet Take 1 tablet by mouth Every Morning. 30 tablet 0    topiramate (TOPAMAX) 100 MG tablet Take 1 tablet by mouth 2 (Two) Times a Day. 60 tablet 0    amoxicillin (AMOXIL) 500 MG capsule Take 1 capsule by mouth 2 (Two) Times a Day.      benazepril (LOTENSIN) 10 MG tablet Take 1 tablet by mouth Daily.      cyanocobalamin 1000 MCG/ML injection       Diclofenac Sodium (VOLTAREN) 1 % gel gel diclofenac 1 % topical gel      EQ Aspirin Adult Low Dose 81 MG EC tablet Take 1 tablet by mouth Daily.      esomeprazole (nexIUM) 40 MG capsule Take 1 capsule by mouth Daily.      ferrous sulfate 324 (65 Fe) MG tablet delayed-release EC tablet Take 1 tablet by mouth Daily.      fexofenadine (ALLEGRA) 180 MG tablet Take 1 tablet by mouth Daily.      fluconazole (DIFLUCAN) 150 MG tablet TAKE 1 TABLET BY MOUTH ONCE DAILY FOR 3 DAYS      fluticasone (FLONASE) 50 MCG/ACT nasal spray       furosemide (LASIX) 40 MG tablet Take 1 tablet by mouth Daily.      HYDROcodone-acetaminophen (NORCO) 7.5-325 MG per tablet Take 1 tablet by mouth Every 8 (Eight) Hours As Needed. for pain      hydrOXYzine (ATARAX) 50 MG tablet Take 1 tablet by mouth 2 (Two) Times a Day As Needed for Anxiety. 60 tablet 0    levothyroxine (SYNTHROID, LEVOTHROID) 150 MCG tablet Take 1 tablet by mouth Daily.      melatonin 5 MG tablet tablet  Take 2 tablets by mouth At Night As Needed (for sleep). 60 tablet 0    methocarbamol (ROBAXIN) 750 MG tablet TAKE 1 TABLET BY MOUTH 4 TIMES DAILY AS NEEDED      nitroglycerin (NITROSTAT) 0.4 MG SL tablet Place 1 tablet under the tongue Every 5 (Five) Minutes As Needed for Chest Pain. do not exceed a total of 3 doses in 15 minutes      phenazopyridine (PYRIDIUM) 100 MG tablet TAKE 1 TABLET BY MOUTH THREE TIMES DAILY AFTER A MEAL AS NEEDED      potassium chloride 10 MEQ CR tablet TAKE 3 TABLETS BY MOUTH TWICE DAILY WITH FOOD      pregabalin (LYRICA) 50 MG capsule Take 3 capsules by mouth Every 12 (Twelve) Hours.      Suvorexant 5 MG tablet Take 1 tablet by mouth every night at bedtime. 30 tablet 0    valACYclovir (VALTREX) 500 MG tablet Take 1 tablet by mouth Daily.      vitamin B-12 (CYANOCOBALAMIN) 1000 MCG tablet Take 1 tablet by mouth 1 (One) Time Per Week.      vitamin D (ERGOCALCIFEROL) 1.25 MG (46125 UT) capsule capsule Take 1 capsule by mouth 1 (One) Time Per Week.       No current facility-administered medications for this visit.       Review of Systems:    Review of Systems   Constitutional:  Positive for activity change and fatigue. Negative for unexpected weight gain.   Respiratory: Negative.     Cardiovascular: Negative.    Neurological:  Negative for headache.   Psychiatric/Behavioral:  Positive for sleep disturbance, depressed mood and stress. Negative for agitation, behavioral problems, decreased concentration, dysphoric mood, hallucinations, self-injury, suicidal ideas and negative for hyperactivity. The patient is nervous/anxious.        Physical Exam:   Physical Exam  Vitals reviewed.   Constitutional:       Appearance: Normal appearance.   Pulmonary:      Effort: Pulmonary effort is normal.   Neurological:      Mental Status: She is alert and oriented to person, place, and time. Mental status is at baseline.   Psychiatric:         Attention and Perception: Attention and perception normal. She is  attentive.         Mood and Affect: Mood is anxious and depressed. Affect is not flat or tearful.         Speech: Speech normal.         Behavior: Behavior normal. Behavior is cooperative.         Thought Content: Thought content normal.         Cognition and Memory: Cognition normal. Memory is impaired.         Judgment: Judgment normal.       Vitals:  There were no vitals taken for this visit. There is no height or weight on file to calculate BMI.    Last 3 Blood Pressure Readings:  BP Readings from Last 3 Encounters:   01/22/25 110/70   09/25/24 124/82   03/08/24 128/79       Mental Status Exam:   Hygiene:   good  Cooperation:  Cooperative  Eye Contact:  Good  Psychomotor Behavior:  Appropriate  Affect:  Full range  Mood: normal  Hopelessness: 0  Speech:  Normal  Thought Process:  Linear  Thought Content:  Normal  Suicidal:  None  Homicidal:  None  Hallucinations:  None  Delusion:  None  Memory:  Intact  Orientation:  Person, Place, Time, and Situation  Reliability:  good  Insight:  Good  Judgement:  Fair  Impulse Control:  Fair  Physical/Medical Issues:  Yes see past medical history       Lab Results:   No visits with results within 3 Month(s) from this visit.   Latest known visit with results is:   Office Visit on 01/22/2025   Component Date Value Ref Range Status    Amphetamine Screen, Urine 01/22/2025 Positive (A)  Negative Final    PRELIMINARY RESULTS. PLEASE REFER TO THE [facility] LAB CONFIRMATION FOR FINAL RESULTS.    Barbiturates Screen, Urine 01/22/2025 Negative  Negative Final    Buprenorphine, Screen, Urine 01/22/2025 Negative  Negative Final    Benzodiazepine Screen, Urine 01/22/2025 Negative  Negative Final    Cocaine Screen, Urine 01/22/2025 Negative  Negative Final    MDMA (ECSTASY) 01/22/2025 Negative  Negative Final    Methamphetamine, Ur 01/22/2025 Negative  Negative Final    Methadone Screen, Urine 01/22/2025 Negative  Negative Final    Morphine/Opiates Screen, Urine 01/22/2025 Positive (A)   Negative Final    Oxycodone Screen, Urine 01/22/2025 Negative  Negative Final    Phencyclidine (PCP), Urine 01/22/2025 Negative  Negative Final    Propoxyphene Scree, Urine 01/22/2025 Negative  Negative Final    Tricyclic Antidepressants Screen 01/22/2025 Negative  Negative Final    THC, Screen, Urine 01/22/2025 Negative  Negative Final    Gabapentin 01/22/2025 NEGATIVE   Final    ETHANOL URINE SCREEN 01/22/2025 Negative   Final    Fentanyl, Urine 01/22/2025 Negative  Negative Final       Assessment & Plan   Diagnoses and all orders for this visit:    1. Insomnia, unspecified type (Primary)  -     Suvorexant 5 MG tablet; Take 1 tablet by mouth every night at bedtime.  Dispense: 30 tablet; Refill: 0    2. Adult ADHD  -     amphetamine-dextroamphetamine (Adderall) 30 MG tablet; Take 1 tablet by mouth Daily. Take at lunchtime  Dispense: 30 tablet; Refill: 0  -     amphetamine-dextroamphetamine XR (ADDERALL XR) 30 MG 24 hr capsule; Take 1 capsule by mouth Every Morning  Dispense: 30 capsule; Refill: 0    3. Excessive daytime sleepiness  -     amphetamine-dextroamphetamine (Adderall) 30 MG tablet; Take 1 tablet by mouth Daily. Take at lunchtime  Dispense: 30 tablet; Refill: 0  -     amphetamine-dextroamphetamine XR (ADDERALL XR) 30 MG 24 hr capsule; Take 1 capsule by mouth Every Morning  Dispense: 30 capsule; Refill: 0    4. Major depressive disorder, recurrent episode, moderate  -     topiramate (TOPAMAX) 100 MG tablet; Take 1 tablet by mouth 2 (Two) Times a Day.  Dispense: 60 tablet; Refill: 0  -     PARoxetine (PAXIL) 40 MG tablet; Take 1 tablet by mouth Every Morning.  Dispense: 30 tablet; Refill: 0  -     Brexpiprazole 2 MG tablet; Take 1 tablet by mouth Daily.  Dispense: 30 tablet; Refill: 2    5. Generalized anxiety disorder  -     topiramate (TOPAMAX) 100 MG tablet; Take 1 tablet by mouth 2 (Two) Times a Day.  Dispense: 60 tablet; Refill: 0  -     PARoxetine (PAXIL) 40 MG tablet; Take 1 tablet by mouth Every  Morning.  Dispense: 30 tablet; Refill: 0  -     Brexpiprazole 2 MG tablet; Take 1 tablet by mouth Daily.  Dispense: 30 tablet; Refill: 2    Other orders  -     Cariprazine HCl (Vraylar) 4.5 MG capsule capsule; Take 1 capsule by mouth Daily.  Dispense: 30 capsule; Refill: 0            Visit Diagnoses:    ICD-10-CM ICD-9-CM   1. Insomnia, unspecified type  G47.00 780.52   2. Adult ADHD  F90.9 314.01   3. Excessive daytime sleepiness  G47.19 780.54   4. Major depressive disorder, recurrent episode, moderate  F33.1 296.32   5. Generalized anxiety disorder  F41.1 300.02           GOALS:  Short Term Goals: Patient will be compliant with medication, and patient will have no significant medication related side effects.  Patient will be engaged in psychotherapy as indicated.  Patient will report subjective improvement of symptoms.  Long term goals: To stabilize mood and treat/improve subjective symptoms, the patient will stay out of the hospital, the patient will be at an optimal level of functioning, and the patient will take all medications as prescribed.  The patient/guardian verbalized understanding and agreement with goals that were mutually set.      TREATMENT PLAN: Continue supportive psychotherapy efforts and medications as indicated.  Pharmacological and Non-Pharmacological treatment options discussed during today's visit. Patient/Guardian acknowledged and verbally consented with current treatment plan and was educated on the importance of compliance with treatment and follow-up appointments.      MEDICATION ISSUES:  Discussed medication options and treatment plan of prescribed medication as well as the risks, benefits, any black box warnings, and side effects including potential falls, possible impaired driving, and metabolic adversities among others. Patient is agreeable to call the office with any worsening of symptoms or onset of side effects, or if any concerns or questions arise.  The contact information for  the office is made available to the patient. Patient is agreeable to call 911 or go to the nearest ER should they begin having any SI/HI, or if any urgent concerns arise. No medication side effects or related complaints today.     MEDS ORDERED DURING VISIT:  New Medications Ordered This Visit   Medications    amphetamine-dextroamphetamine (Adderall) 30 MG tablet     Sig: Take 1 tablet by mouth Daily. Take at lunchtime     Dispense:  30 tablet     Refill:  0    amphetamine-dextroamphetamine XR (ADDERALL XR) 30 MG 24 hr capsule     Sig: Take 1 capsule by mouth Every Morning     Dispense:  30 capsule     Refill:  0    Cariprazine HCl (Vraylar) 4.5 MG capsule capsule     Sig: Take 1 capsule by mouth Daily.     Dispense:  30 capsule     Refill:  0    topiramate (TOPAMAX) 100 MG tablet     Sig: Take 1 tablet by mouth 2 (Two) Times a Day.     Dispense:  60 tablet     Refill:  0    PARoxetine (PAXIL) 40 MG tablet     Sig: Take 1 tablet by mouth Every Morning.     Dispense:  30 tablet     Refill:  0    Brexpiprazole 2 MG tablet     Sig: Take 1 tablet by mouth Daily.     Dispense:  30 tablet     Refill:  2    Suvorexant 5 MG tablet     Sig: Take 1 tablet by mouth every night at bedtime.     Dispense:  30 tablet     Refill:  0        - Continue Adderall XR 30 mg by mouth every day in the morning and Adderall 30 mg by mouth at lunchtime for breakthrough ADHD symptoms.  - Continue Topamax  100 mg by mouth twice daily for mood.   --Continue hydroxyzine 25 mg PO PRN BID for anxiety.   - Continue Paxil 40 mg by mouth daily for mood.  - Continue Rexulti 2 mg by mouth daily for mood.  - Increase Vraylar to 4.5 mg as patient had responded well to this in the past.  -Start Belsomra 5 mg by mouth every night for insomnia.  - Jimmy reviewed at this time and is appropriate.  - Discussed with patient possible side effects to include tachycardia, hypertension, cardiac death, anxiety, irritability, and mood fluctuations.  Patient verbalizes  understanding and is aware to stop the medication and seek medical attention immediately if she begins to experience any of these symptoms.  - Encourage patient to go to nearest ED if SI/HI develop.       Follow Up Appointment:   No follow-ups on file.             This document has been electronically signed by KRISHNA Burgess  June 17, 2025 10:04 EDT         Dictated Utilizing Dragon Dictation: Part of this note may be an electronic transcription/translation of spoken language to printed text using the Dragon Dictation System.

## 2025-06-17 NOTE — TELEPHONE ENCOUNTER
ELIZABETH WEEKS (Key: [id number])  PA Case ID #: [id number]  Rx #: [id number]    Drug  Belsomra 5MG tablets    Form  [employer] [place] Medicaid ePA Form 2017 NCPDP  Original Claim Info  [address], SUBMIT THRU [email or web address]      Sent to Plan today

## 2025-06-17 NOTE — TELEPHONE ENCOUNTER
Outcome  Approved today by [place] [employer] Med[employer] 2017  The request has been approved. The authorization is effective from 06/17/2025 to 12/17/2025, as long as the member is enrolled in their current health plan. A written notification letter will follow with additional details.  Effective Date: 6/17/2025  Authorization Expiration Date: 12/17/2025

## 2025-07-16 ENCOUNTER — OFFICE VISIT (OUTPATIENT)
Dept: PSYCHIATRY | Facility: CLINIC | Age: 45
End: 2025-07-16
Payer: COMMERCIAL

## 2025-07-16 VITALS
HEIGHT: 60 IN | DIASTOLIC BLOOD PRESSURE: 86 MMHG | WEIGHT: 184 LBS | SYSTOLIC BLOOD PRESSURE: 152 MMHG | HEART RATE: 56 BPM | OXYGEN SATURATION: 96 % | BODY MASS INDEX: 36.12 KG/M2

## 2025-07-16 DIAGNOSIS — G47.19 EXCESSIVE DAYTIME SLEEPINESS: ICD-10-CM

## 2025-07-16 DIAGNOSIS — F33.1 MAJOR DEPRESSIVE DISORDER, RECURRENT EPISODE, MODERATE: ICD-10-CM

## 2025-07-16 DIAGNOSIS — G47.00 INSOMNIA, UNSPECIFIED TYPE: ICD-10-CM

## 2025-07-16 DIAGNOSIS — F90.9 ADULT ADHD: ICD-10-CM

## 2025-07-16 DIAGNOSIS — F41.1 GENERALIZED ANXIETY DISORDER: ICD-10-CM

## 2025-07-16 RX ORDER — DEXTROAMPHETAMINE SACCHARATE, AMPHETAMINE ASPARTATE, DEXTROAMPHETAMINE SULFATE AND AMPHETAMINE SULFATE 7.5; 7.5; 7.5; 7.5 MG/1; MG/1; MG/1; MG/1
30 TABLET ORAL DAILY
Qty: 30 TABLET | Refills: 0 | Status: SHIPPED | OUTPATIENT
Start: 2025-07-16 | End: 2026-07-16

## 2025-07-16 RX ORDER — PAROXETINE 40 MG/1
40 TABLET, FILM COATED ORAL EVERY MORNING
Qty: 30 TABLET | Refills: 0 | Status: SHIPPED | OUTPATIENT
Start: 2025-07-16

## 2025-07-16 RX ORDER — TOPIRAMATE 100 MG/1
100 TABLET, FILM COATED ORAL DAILY
Qty: 7 TABLET | Refills: 0 | Status: SHIPPED | OUTPATIENT
Start: 2025-07-16 | End: 2025-07-23

## 2025-07-16 RX ORDER — DEXTROAMPHETAMINE SACCHARATE, AMPHETAMINE ASPARTATE MONOHYDRATE, DEXTROAMPHETAMINE SULFATE AND AMPHETAMINE SULFATE 7.5; 7.5; 7.5; 7.5 MG/1; MG/1; MG/1; MG/1
30 CAPSULE, EXTENDED RELEASE ORAL EVERY MORNING
Qty: 30 CAPSULE | Refills: 0 | Status: SHIPPED | OUTPATIENT
Start: 2025-07-16
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
## 2025-07-30 ENCOUNTER — OFFICE VISIT (OUTPATIENT)
Dept: PSYCHIATRY | Facility: CLINIC | Age: 45
End: 2025-07-30
Payer: COMMERCIAL

## 2025-07-30 VITALS
BODY MASS INDEX: 35.97 KG/M2 | DIASTOLIC BLOOD PRESSURE: 71 MMHG | OXYGEN SATURATION: 97 % | SYSTOLIC BLOOD PRESSURE: 120 MMHG | HEART RATE: 90 BPM | HEIGHT: 60 IN | WEIGHT: 183.2 LBS

## 2025-07-30 DIAGNOSIS — F41.1 GENERALIZED ANXIETY DISORDER: ICD-10-CM

## 2025-07-30 DIAGNOSIS — F33.1 MAJOR DEPRESSIVE DISORDER, RECURRENT EPISODE, MODERATE: ICD-10-CM

## 2025-07-30 DIAGNOSIS — Z79.899 MEDICATION MANAGEMENT: Primary | ICD-10-CM

## 2025-07-30 DIAGNOSIS — G47.19 EXCESSIVE DAYTIME SLEEPINESS: ICD-10-CM

## 2025-07-30 DIAGNOSIS — F90.9 ADULT ADHD: ICD-10-CM

## 2025-07-30 LAB
AMPHET+METHAMPHET UR QL: POSITIVE
AMPHETAMINE INTERNAL CONTROL: ABNORMAL
AMPHETAMINES UR QL: NEGATIVE
BARBITURATE INTERNAL CONTROL: ABNORMAL
BARBITURATES UR QL SCN: NEGATIVE
BENZODIAZ UR QL SCN: NEGATIVE
BENZODIAZEPINE INTERNAL CONTROL: ABNORMAL
BUPRENORPHINE INTERNAL CONTROL: ABNORMAL
BUPRENORPHINE SERPL-MCNC: NEGATIVE NG/ML
CANNABINOIDS SERPL QL: NEGATIVE
COCAINE INTERNAL CONTROL: ABNORMAL
COCAINE UR QL: NEGATIVE
MDMA (ECSTASY) INTERNAL CONTROL: ABNORMAL
MDMA UR QL SCN: NEGATIVE
METHADONE INTERNAL CONTROL: ABNORMAL
METHADONE UR QL SCN: NEGATIVE
METHAMPHETAMINE INTERNAL CONTROL: ABNORMAL
MORPHINE INTERNAL CONTROL: ABNORMAL
MORPHINE/OPIATES SCREEN, URINE: POSITIVE
OXYCODONE INTERNAL CONTROL: ABNORMAL
OXYCODONE UR QL SCN: NEGATIVE
PCP UR QL SCN: NEGATIVE
PHENCYCLIDINE INTERNAL CONTROL: ABNORMAL
PROPOXYPH UR QL SCN: NEGATIVE
PROPOXYPHENE INTERNAL CONTROL: ABNORMAL
THC INTERNAL CONTROL: ABNORMAL
TRICYCLIC ANTIDEPRESSANTS INTERNAL CONTROL: ABNORMAL
TRICYCLICS UR QL SCN: NEGATIVE

## 2025-07-30 RX ORDER — QUETIAPINE FUMARATE 25 MG/1
25 TABLET, FILM COATED ORAL NIGHTLY
Qty: 30 TABLET | Refills: 0 | Status: SHIPPED | OUTPATIENT
Start: 2025-07-30

## 2025-07-30 RX ORDER — PAROXETINE 40 MG/1
40 TABLET, FILM COATED ORAL EVERY MORNING
Qty: 30 TABLET | Refills: 0 | Status: SHIPPED | OUTPATIENT
Start: 2025-07-30

## 2025-07-30 NOTE — PROGRESS NOTES
"    Subjective   Yuni Garcia is a 45 y.o. female who presents today for follow up    Chief Complaint:  ADHD, depression,    History of Present Illness:    History of Present Illness  Yuni is a 45-year-old female who presents today for a follow-up visit.  She reports struggling with feeling like a failure and feeling guilty. Her blood pressure is better today and states that she is still having some blurred vision and the headaches have improved.  States that her mood has been a little better than it was but she is still having some crying spells and is tearful at times during today's appointment.  States that she discontinued the Vraylar but continued to take to Rexulti because she felt like it was helping more than the Vraylar dead.  She is still down and depressed a lot of the days and often feels guilty because she is not working.  States that she feels like she is unable to work because of the amount of stress that she was under and states that at times, she felt like she was on the verge of having another stroke when at work.  States that there would be times that the left side (affected area from her previous stroke) of her face would draw up and she had difficulty with communication due to the high levels of stress.  She states that her back is still in a lot of pain and she is also struggling with arthritis in her fingers and hands.  States that sometimes she feels warm sensations and tingling in her fingertips.  She states that without the Adderall, it would be very difficult for her to even function of 2 get out of the bed and perform basic duties like cooking and cleaning.  States that her sleep has not been the best and she often struggles to fall asleep.  States that she feels like a \"loser\" and ruminates on this at night thinking that she is a failure in life because she is no longer working.  Appetite is fair.  Involuntary perioral movements have stopped.  Tapering off of some of the medications " have seemed to help with brain fog but she still struggles with memory and concentration.  She feels bad because she cannot work but then feels bad about not working and struggling with finances.  She is interested in pursuing disability due to multiple health problems which I do feel like she would be a good candidate for.  No SI/HI/AVH.    PHQ-9 Depression Screening  Little interest or pleasure in doing things? Several days   Feeling down, depressed, or hopeless? Several days   PHQ-2 Total Score 2   Trouble falling or staying asleep, or sleeping too much? Several days   Feeling tired or having little energy? Several days   Poor appetite or overeating? Several days   Feeling bad about yourself - or that you are a failure or have let yourself or your family down? Several days   Trouble concentrating on things, such as reading the newspaper or watching television? Several days   Moving or speaking so slowly that other people could have noticed? Or the opposite - being so fidgety or restless that you have been moving around a lot more than usual? Not at all     Thoughts that you would be better off dead, or of hurting yourself in some way? Not at all   PHQ-9 Total Score 7   If you checked off any problems, how difficult have these problems made it for you to do your work, take care of things at home, or get along with other people? Somewhat difficult       JUSTUS-7 Anxiety Screening  Feeling nervous, anxious or on edge? Several days   Not being able to stop or control worrying? Several days   Worrying too much about different things? Several days   Trouble relaxing? Several days   Being so restless that it is hard to sit still? Not at all   Becoming easily annoyed or irritable? Several days   Feeling afraid as if something awful might happen? Several days   JUSTUS-7 Total Score 6   If you checked any problems, how difficult have these problems made it for you to do your work, take care of things at home, or get along with  other people? Somewhat difficult           The following portions of the patient's history were reviewed and updated as appropriate: allergies, current medications, past family history, past medical history, past social history, past surgical history and problem list.     Past Psychiatric History:  Began Treatment: Several years ago.  She has been primarily treated from her primary care provider but has seen two PMHNP's for 1 visit each.  Diagnoses:Depression, Anxiety, and OCD, insomnia  Psychiatrist: Has seen Kandis Sneed and Flower Benoit here in the past.  Therapist:Denies  Admission History:Denies  Medication Trials: zoloft, lexapro, celexa, effexor, cymbalta, prozac, wellbutrin, vraylar, caplyta, abilify, trazodone, Concerta, Vyvanse, Adderall, Adderall XR, elavil, seroquel, rexulti, paxil  Self Harm: Denies  Suicide Attempts:Denies   Psychosis, Anxiety, Depression: Denies      Past Medical History:  Past Medical History:   Diagnosis Date    ADHD (attention deficit hyperactivity disorder)     Anxiety     Depression     Disease of thyroid gland     HTN (hypertension)     Obsessive-compulsive disorder      Substance Abuse History:   Denies    Social History:  Social History     Socioeconomic History    Marital status:    Tobacco Use    Smoking status: Never    Smokeless tobacco: Never   Vaping Use    Vaping status: Never Used   Substance and Sexual Activity    Alcohol use: Never    Drug use: Never    Sexual activity: Defer     Family History:  Family History   Problem Relation Age of Onset    Diabetes Mother     OCD Mother     Heart disease Father      Past Surgical History:  Past Surgical History:   Procedure Laterality Date    BARIATRIC SURGERY      BREAST SURGERY      lift    GALLBLADDER SURGERY      STOMACH SURGERY      tummy tuck    TUBAL ABDOMINAL LIGATION         Problem List:  Patient Active Problem List   Diagnosis    Abdominal wall bulge       Allergy:   Allergies   Allergen Reactions     Latex Hives    Bactrim [Sulfamethoxazole-Trimethoprim] Rash        Current Medications:   Current Outpatient Medications   Medication Sig Dispense Refill    amphetamine-dextroamphetamine (Adderall) 30 MG tablet Take 1 tablet by mouth Daily. Take at lunchtime 30 tablet 0    amphetamine-dextroamphetamine XR (ADDERALL XR) 30 MG 24 hr capsule Take 1 capsule by mouth Every Morning 30 capsule 0    benazepril (LOTENSIN) 10 MG tablet Take 1 tablet by mouth Daily.      cyanocobalamin 1000 MCG/ML injection       Diclofenac Sodium (VOLTAREN) 1 % gel gel diclofenac 1 % topical gel      EQ Aspirin Adult Low Dose 81 MG EC tablet Take 1 tablet by mouth Daily.      esomeprazole (nexIUM) 40 MG capsule Take 1 capsule by mouth Daily.      ferrous sulfate 324 (65 Fe) MG tablet delayed-release EC tablet Take 1 tablet by mouth Daily.      fexofenadine (ALLEGRA) 180 MG tablet Take 1 tablet by mouth Daily.      fluconazole (DIFLUCAN) 150 MG tablet TAKE 1 TABLET BY MOUTH ONCE DAILY FOR 3 DAYS      fluticasone (FLONASE) 50 MCG/ACT nasal spray       furosemide (LASIX) 40 MG tablet Take 1 tablet by mouth Daily.      HYDROcodone-acetaminophen (NORCO) 7.5-325 MG per tablet Take 1 tablet by mouth Every 8 (Eight) Hours As Needed. for pain      levothyroxine (SYNTHROID, LEVOTHROID) 150 MCG tablet Take 1 tablet by mouth Daily.      melatonin 5 MG tablet tablet Take 2 tablets by mouth At Night As Needed (for sleep). 60 tablet 0    methocarbamol (ROBAXIN) 750 MG tablet TAKE 1 TABLET BY MOUTH 4 TIMES DAILY AS NEEDED      nitroglycerin (NITROSTAT) 0.4 MG SL tablet Place 1 tablet under the tongue Every 5 (Five) Minutes As Needed for Chest Pain. do not exceed a total of 3 doses in 15 minutes      PARoxetine (PAXIL) 40 MG tablet Take 1 tablet by mouth Every Morning. 30 tablet 0    phenazopyridine (PYRIDIUM) 100 MG tablet TAKE 1 TABLET BY MOUTH THREE TIMES DAILY AFTER A MEAL AS NEEDED      potassium chloride 10 MEQ CR tablet TAKE 3 TABLETS BY MOUTH TWICE  DAILY WITH FOOD      pregabalin (LYRICA) 50 MG capsule Take 3 capsules by mouth Every 12 (Twelve) Hours.      valACYclovir (VALTREX) 500 MG tablet Take 1 tablet by mouth Daily.      vitamin B-12 (CYANOCOBALAMIN) 1000 MCG tablet Take 1 tablet by mouth 1 (One) Time Per Week.      vitamin D (ERGOCALCIFEROL) 1.25 MG (66395 UT) capsule capsule Take 1 capsule by mouth 1 (One) Time Per Week.      amoxicillin (AMOXIL) 500 MG capsule Take 1 capsule by mouth 2 (Two) Times a Day. (Patient not taking: Reported on 7/30/2025)      Brexpiprazole 3 MG tablet Take 1 tablet by mouth Daily. 30 tablet 0    QUEtiapine (SEROquel) 25 MG tablet Take 1 tablet by mouth Every Night. 30 tablet 0     No current facility-administered medications for this visit.       Review of Systems:    Review of Systems   Constitutional:  Positive for activity change, appetite change and fatigue. Negative for unexpected weight gain.   Respiratory: Negative.     Cardiovascular: Negative.    Neurological:  Positive for numbness (Fingertips).   Psychiatric/Behavioral:  Positive for dysphoric mood, sleep disturbance, depressed mood and stress. Negative for agitation, behavioral problems, decreased concentration, hallucinations, self-injury, suicidal ideas and negative for hyperactivity. The patient is nervous/anxious.        Physical Exam:   Physical Exam  Vitals reviewed.   Constitutional:       Appearance: Normal appearance.   Pulmonary:      Effort: Pulmonary effort is normal.   Neurological:      Mental Status: She is alert and oriented to person, place, and time. Mental status is at baseline.   Psychiatric:         Attention and Perception: Attention and perception normal. She is attentive.         Mood and Affect: Mood is anxious and depressed. Affect is tearful.         Speech: Speech normal.         Behavior: Behavior normal. Behavior is cooperative.         Thought Content: Thought content normal.         Cognition and Memory: Cognition normal. Memory is  "impaired.         Judgment: Judgment normal.       Vitals:  Blood pressure 120/71, pulse 90, height 152.4 cm (60\"), weight 83.1 kg (183 lb 3.2 oz), SpO2 97%. Body mass index is 35.78 kg/m².    Last 3 Blood Pressure Readings:  BP Readings from Last 3 Encounters:   07/30/25 120/71   07/16/25 152/86   01/22/25 110/70       Mental Status Exam:   Hygiene:   good  Cooperation:  Cooperative  Eye Contact:  Good  Psychomotor Behavior:  Appropriate  Affect:  Full range  Mood: normal  Hopelessness: 5  Speech:  Normal  Thought Process:  Linear  Thought Content:  Normal  Suicidal:  None  Homicidal:  None  Hallucinations:  None  Delusion:  None  Memory:  Intact  Orientation:  Person, Place, Time, and Situation  Reliability:  good  Insight:  Good  Judgement:  Fair  Impulse Control:  Fair  Physical/Medical Issues:  Yes see past medical history       Lab Results:   Office Visit on 07/30/2025   Component Date Value Ref Range Status    Amphetamine Screen, Urine 07/30/2025 Positive (A)  Negative Final    AMP INTERNAL CONTROL 07/30/2025 Passed  Passed Final    Barbiturates Screen, Urine 07/30/2025 Negative  Negative Final    BARBITURATE INTERNAL CONTROL 07/30/2025 Passed  Passed Final    Buprenorphine, Screen, Urine 07/30/2025 Negative  Negative Final    BUPRENORPHINE INTERNAL CONTROL 07/30/2025 Passed  Passed Final    Benzodiazepine Screen, Urine 07/30/2025 Negative  Negative Final    BENZODIAZEPINE INTERNAL CONTROL 07/30/2025 Passed  Passed Final    Cocaine Screen, Urine 07/30/2025 Negative  Negative Final    COCAINE INTERNAL CONTROL 07/30/2025 Passed  Passed Final    MDMA (ECSTASY) 07/30/2025 Negative  Negative Final    MDMA (ECSTASY) INTERNAL CONTROL 07/30/2025 Passed  Passed Final    Methamphetamine, Ur 07/30/2025 Negative  Negative Final    METHAMPHETAMINE INTERNAL CONTROL 07/30/2025 Passed  Passed Final    Methadone Screen, Urine 07/30/2025 Negative  Negative Final    METHADONE INTERNAL CONTROL 07/30/2025 Passed  Passed Final    " Morphine/Opiates Screen, Urine 07/30/2025 Positive (A)  Negative Final    MOR INTERNAL CONTROL 07/30/2025 Passed  Passed Final    Oxycodone Screen, Urine 07/30/2025 Negative  Negative Final    OXYCODONE INTERNAL CONTROL 07/30/2025 Passed  Passed Final    Phencyclidine (PCP), Urine 07/30/2025 Negative  Negative Final    PHENCYCLIDINE INTERNAL CONTROL 07/30/2025 Passed  Passed Final    Propoxyphene Scree, Urine 07/30/2025 Negative  Negative Final    PPX INTERNAL CONTROL 07/30/2025 Passed  Passed Final    Tricyclic Antidepressants Screen 07/30/2025 Negative  Negative Final    TCA INTERNAL CONTROL 07/30/2025 Passed  Passed Final    THC, Screen, Urine 07/30/2025 Negative  Negative Final    THC INTERNAL CONTROL 07/30/2025 Passed  Passed Final       Assessment & Plan   Diagnoses and all orders for this visit:    1. Medication management (Primary)  -     POC Medline 14 Panel Urine Drug Screen  -     Behavioral Health Full Screen and Definitive Testing (SUE) -    2. Major depressive disorder, recurrent episode, moderate  -     PARoxetine (PAXIL) 40 MG tablet; Take 1 tablet by mouth Every Morning.  Dispense: 30 tablet; Refill: 0    3. Generalized anxiety disorder  -     PARoxetine (PAXIL) 40 MG tablet; Take 1 tablet by mouth Every Morning.  Dispense: 30 tablet; Refill: 0    4. Adult ADHD    5. Excessive daytime sleepiness    Other orders  -     Brexpiprazole 3 MG tablet; Take 1 tablet by mouth Daily.  Dispense: 30 tablet; Refill: 0  -     QUEtiapine (SEROquel) 25 MG tablet; Take 1 tablet by mouth Every Night.  Dispense: 30 tablet; Refill: 0              Visit Diagnoses:    ICD-10-CM ICD-9-CM   1. Medication management  Z79.899 V58.69   2. Major depressive disorder, recurrent episode, moderate  F33.1 296.32   3. Generalized anxiety disorder  F41.1 300.02   4. Adult ADHD  F90.9 314.01   5. Excessive daytime sleepiness  G47.19 780.54             GOALS:  Short Term Goals: Patient will be compliant with medication, and patient  will have no significant medication related side effects.  Patient will be engaged in psychotherapy as indicated.  Patient will report subjective improvement of symptoms.  Long term goals: To stabilize mood and treat/improve subjective symptoms, the patient will stay out of the hospital, the patient will be at an optimal level of functioning, and the patient will take all medications as prescribed.  The patient/guardian verbalized understanding and agreement with goals that were mutually set.      TREATMENT PLAN: Continue supportive psychotherapy efforts and medications as indicated.  Pharmacological and Non-Pharmacological treatment options discussed during today's visit. Patient/Guardian acknowledged and verbally consented with current treatment plan and was educated on the importance of compliance with treatment and follow-up appointments.      MEDICATION ISSUES:  Discussed medication options and treatment plan of prescribed medication as well as the risks, benefits, any black box warnings, and side effects including potential falls, possible impaired driving, and metabolic adversities among others. Patient is agreeable to call the office with any worsening of symptoms or onset of side effects, or if any concerns or questions arise.  The contact information for the office is made available to the patient. Patient is agreeable to call 911 or go to the nearest ER should they begin having any SI/HI, or if any urgent concerns arise. No medication side effects or related complaints today.     MEDS ORDERED DURING VISIT:  New Medications Ordered This Visit   Medications    PARoxetine (PAXIL) 40 MG tablet     Sig: Take 1 tablet by mouth Every Morning.     Dispense:  30 tablet     Refill:  0    Brexpiprazole 3 MG tablet     Sig: Take 1 tablet by mouth Daily.     Dispense:  30 tablet     Refill:  0    QUEtiapine (SEROquel) 25 MG tablet     Sig: Take 1 tablet by mouth Every Night.     Dispense:  30 tablet     Refill:  0         - Continue Adderall XR 30 mg by mouth every day in the morning and Adderall 30 mg by mouth at lunchtime for breakthrough ADHD symptoms.  - Continue Paxil 40 mg by mouth daily for mood.  - She had continued taking Rexulti 2 mg.  Will increase to 3 mg daily.  - Discontinue Vraylar.  -Discontinue hydroxyzine.  Was not effective at treating anxiety or insomnia/picking.  -Start Seroquel 25 mg every night as needed for sleep/anxiety.  - Jimmy reviewed at this time and is appropriate.  - Discussed with patient possible side effects to include tachycardia, hypertension, cardiac death, increased risk of stroke and other cardiovascular incidents, anxiety, irritability, and mood fluctuations.   - Encourage patient to go to nearest ED if SI/HI develop.  -Encouraged to follow up with primary care provider in regards to multiple health concerns.     Follow Up Appointment:   Return in about 2 weeks (around 8/13/2025).         This document has been electronically signed by KRISHNA Burgess  July 30, 2025 10:14 EDT         Dictated Utilizing Dragon Dictation: Part of this note may be an electronic transcription/translation of spoken language to printed text using the Dragon Dictation System.

## 2025-07-31 LAB
6-ACETYLMORPHINE: NOT DETECTED NG/ML
ALCOHOL, ETHYL: NOT DETECTED MG/DL
AMPHETAMINE: DETECTED NG/ML
BENZODIAZ BLD QL: NOT DETECTED NG/ML
BUPRENORPHINE SAL CFM-MCNC: NOT DETECTED NG/ML
COCAINE METABOLITE: NOT DETECTED NG/ML
CREATININE: 109 MG/DL
EDDP SERPL QL: NOT DETECTED NG/ML
FENTANYL-EIA: NOT DETECTED NG/ML
METHAMPHETAMINE: NOT DETECTED NG/ML
OPIATES: DETECTED NG/ML
OXIDANTS: NOT DETECTED UG/ML
OXYCODONE: NOT DETECTED NG/ML
PCP: NOT DETECTED NG/ML
PH: 5.4 (ref 4.5–9)
SPECIFIC GRAVITY, QUAN: 1.02 (ref 1–1.03)
THC: NOT DETECTED NG/ML

## 2025-08-13 ENCOUNTER — LAB (OUTPATIENT)
Dept: LAB | Facility: HOSPITAL | Age: 45
End: 2025-08-13
Payer: COMMERCIAL

## 2025-08-13 ENCOUNTER — OFFICE VISIT (OUTPATIENT)
Dept: PSYCHIATRY | Facility: CLINIC | Age: 45
End: 2025-08-13
Payer: COMMERCIAL

## 2025-08-13 VITALS
BODY MASS INDEX: 38.21 KG/M2 | WEIGHT: 194.6 LBS | SYSTOLIC BLOOD PRESSURE: 113 MMHG | DIASTOLIC BLOOD PRESSURE: 76 MMHG | HEART RATE: 62 BPM | HEIGHT: 60 IN

## 2025-08-13 DIAGNOSIS — F41.1 GENERALIZED ANXIETY DISORDER: ICD-10-CM

## 2025-08-13 DIAGNOSIS — G47.19 EXCESSIVE DAYTIME SLEEPINESS: ICD-10-CM

## 2025-08-13 DIAGNOSIS — F33.1 MAJOR DEPRESSIVE DISORDER, RECURRENT EPISODE, MODERATE: Primary | ICD-10-CM

## 2025-08-13 DIAGNOSIS — F33.1 MAJOR DEPRESSIVE DISORDER, RECURRENT EPISODE, MODERATE: ICD-10-CM

## 2025-08-13 DIAGNOSIS — F90.9 ADULT ADHD: ICD-10-CM

## 2025-08-13 DIAGNOSIS — E88.9 METABOLIC DISORDER: ICD-10-CM

## 2025-08-13 PROCEDURE — 80061 LIPID PANEL: CPT

## 2025-08-13 PROCEDURE — 84443 ASSAY THYROID STIM HORMONE: CPT

## 2025-08-13 PROCEDURE — 36415 COLL VENOUS BLD VENIPUNCTURE: CPT

## 2025-08-13 PROCEDURE — 84480 ASSAY TRIIODOTHYRONINE (T3): CPT

## 2025-08-13 PROCEDURE — 84439 ASSAY OF FREE THYROXINE: CPT

## 2025-08-13 PROCEDURE — 82607 VITAMIN B-12: CPT

## 2025-08-13 PROCEDURE — 83036 HEMOGLOBIN GLYCOSYLATED A1C: CPT

## 2025-08-13 RX ORDER — DEXTROAMPHETAMINE SACCHARATE, AMPHETAMINE ASPARTATE MONOHYDRATE, DEXTROAMPHETAMINE SULFATE AND AMPHETAMINE SULFATE 7.5; 7.5; 7.5; 7.5 MG/1; MG/1; MG/1; MG/1
30 CAPSULE, EXTENDED RELEASE ORAL EVERY MORNING
Qty: 30 CAPSULE | Refills: 0 | Status: SHIPPED | OUTPATIENT
Start: 2025-08-13

## 2025-08-13 RX ORDER — QUETIAPINE FUMARATE 50 MG/1
50 TABLET, FILM COATED ORAL NIGHTLY
Qty: 30 TABLET | Refills: 1 | Status: SHIPPED | OUTPATIENT
Start: 2025-08-13

## 2025-08-13 RX ORDER — AMLODIPINE BESYLATE 5 MG/1
TABLET ORAL
COMMUNITY
Start: 2025-07-16

## 2025-08-13 RX ORDER — ONDANSETRON 8 MG/1
8 TABLET, FILM COATED ORAL EVERY 8 HOURS PRN
Qty: 30 TABLET | Refills: 0 | Status: SHIPPED | OUTPATIENT
Start: 2025-08-13

## 2025-08-13 RX ORDER — LIDOCAINE 50 MG/G
PATCH TOPICAL
COMMUNITY
Start: 2025-08-12

## 2025-08-13 RX ORDER — DEXTROAMPHETAMINE SACCHARATE, AMPHETAMINE ASPARTATE, DEXTROAMPHETAMINE SULFATE AND AMPHETAMINE SULFATE 7.5; 7.5; 7.5; 7.5 MG/1; MG/1; MG/1; MG/1
30 TABLET ORAL DAILY
Qty: 30 TABLET | Refills: 0 | Status: SHIPPED | OUTPATIENT
Start: 2025-08-13 | End: 2026-08-13

## 2025-08-13 RX ORDER — PAROXETINE 40 MG/1
40 TABLET, FILM COATED ORAL EVERY MORNING
Qty: 30 TABLET | Refills: 0 | Status: SHIPPED | OUTPATIENT
Start: 2025-08-13

## 2025-08-14 LAB
CHOLEST SERPL-MCNC: 195 MG/DL (ref 0–200)
HBA1C MFR BLD: 5.5 % (ref 4.8–5.6)
HDLC SERPL-MCNC: 66 MG/DL (ref 40–60)
LDLC SERPL CALC-MCNC: 114 MG/DL (ref 0–100)
LDLC/HDLC SERPL: 1.7 {RATIO}
T3 SERPL-MCNC: 86.9 NG/DL (ref 80–200)
T4 FREE SERPL-MCNC: 0.83 NG/DL (ref 0.92–1.68)
TRIGL SERPL-MCNC: 84 MG/DL (ref 0–150)
TSH SERPL DL<=0.05 MIU/L-ACNC: 0.52 UIU/ML (ref 0.27–4.2)
VIT B12 BLD-MCNC: 904 PG/ML (ref 211–946)
VLDLC SERPL-MCNC: 15 MG/DL (ref 5–40)

## 2025-08-21 ENCOUNTER — PRIOR AUTHORIZATION (OUTPATIENT)
Dept: PSYCHIATRY | Facility: CLINIC | Age: 45
End: 2025-08-21
Payer: COMMERCIAL